# Patient Record
Sex: MALE | Race: WHITE | NOT HISPANIC OR LATINO | Employment: OTHER | URBAN - NONMETROPOLITAN AREA
[De-identification: names, ages, dates, MRNs, and addresses within clinical notes are randomized per-mention and may not be internally consistent; named-entity substitution may affect disease eponyms.]

---

## 2017-01-09 ENCOUNTER — TELEPHONE (OUTPATIENT)
Dept: FAMILY MEDICINE CLINIC | Facility: CLINIC | Age: 37
End: 2017-01-09

## 2017-01-09 NOTE — TELEPHONE ENCOUNTER
Called requesting a refill on testosterone,debbie is on your desk.      Wife notified she said she would have to reschedule his apt. That he had started a new job & could not come today but would be ok on testosterone until seen.

## 2017-01-18 ENCOUNTER — TELEPHONE (OUTPATIENT)
Dept: FAMILY MEDICINE CLINIC | Facility: CLINIC | Age: 37
End: 2017-01-18

## 2017-01-18 ENCOUNTER — OFFICE VISIT (OUTPATIENT)
Dept: FAMILY MEDICINE CLINIC | Facility: CLINIC | Age: 37
End: 2017-01-18

## 2017-01-18 VITALS
WEIGHT: 315 LBS | HEART RATE: 84 BPM | OXYGEN SATURATION: 98 % | HEIGHT: 69 IN | BODY MASS INDEX: 46.65 KG/M2 | DIASTOLIC BLOOD PRESSURE: 98 MMHG | SYSTOLIC BLOOD PRESSURE: 133 MMHG

## 2017-01-18 DIAGNOSIS — M51.36 DDD (DEGENERATIVE DISC DISEASE), LUMBAR: ICD-10-CM

## 2017-01-18 DIAGNOSIS — F41.9 ANXIETY AND DEPRESSION: ICD-10-CM

## 2017-01-18 DIAGNOSIS — E66.8 HYPOGONADAL OBESITY: Primary | ICD-10-CM

## 2017-01-18 DIAGNOSIS — F32.A ANXIETY AND DEPRESSION: ICD-10-CM

## 2017-01-18 DIAGNOSIS — I10 BENIGN ESSENTIAL HTN: ICD-10-CM

## 2017-01-18 LAB — TESTOST SERPL-MCNC: 549.09 NG/DL (ref 123.06–813.86)

## 2017-01-18 PROCEDURE — 84403 ASSAY OF TOTAL TESTOSTERONE: CPT | Performed by: NURSE PRACTITIONER

## 2017-01-18 PROCEDURE — 99214 OFFICE O/P EST MOD 30 MIN: CPT | Performed by: NURSE PRACTITIONER

## 2017-01-18 PROCEDURE — 36415 COLL VENOUS BLD VENIPUNCTURE: CPT | Performed by: NURSE PRACTITIONER

## 2017-01-18 PROCEDURE — 84402 ASSAY OF FREE TESTOSTERONE: CPT | Performed by: NURSE PRACTITIONER

## 2017-01-18 RX ORDER — LAMOTRIGINE 25 MG/1
25 TABLET ORAL DAILY
Qty: 30 TABLET | Refills: 5 | Status: SHIPPED | OUTPATIENT
Start: 2017-01-18 | End: 2017-06-07 | Stop reason: SDUPTHER

## 2017-01-18 RX ORDER — DIAZEPAM 5 MG/1
5 TABLET ORAL 2 TIMES DAILY PRN
Qty: 30 TABLET | Refills: 0 | Status: SHIPPED | OUTPATIENT
Start: 2017-01-18 | End: 2017-09-01

## 2017-01-18 RX ORDER — BUSPIRONE HYDROCHLORIDE 10 MG/1
10 TABLET ORAL 3 TIMES DAILY
Qty: 90 TABLET | Refills: 5 | Status: SHIPPED | OUTPATIENT
Start: 2017-01-18 | End: 2017-09-01

## 2017-01-18 NOTE — TELEPHONE ENCOUNTER
----- Message from ALONDRA Li sent at 1/18/2017  1:50 PM EST -----  Let patient know testosterone level is improved      Left a message to return call.    Notified & verbalized understanding

## 2017-01-18 NOTE — MR AVS SNAPSHOT
Brady Mcknight   1/18/2017 11:00 AM   Office Visit    Dept Phone:  [phone]   Encounter #:  [id number]    Provider:  ALONDRA Li   Department:  [facility]                Your Full Care Plan              Today's Medication Changes          These changes are accurate as of: 1/18/17 12:07 PM.  If you have any questions, ask your nurse or doctor.               New Medication(s)Ordered:     busPIRone 10 MG tablet   Commonly known as:  BUSPAR   Take 1 tablet by mouth 3 (Three) Times a Day.       diazePAM 5 MG tablet   Commonly known as:  VALIUM   Take 1 tablet by mouth 2 (Two) Times a Day As Needed for anxiety.         Stop taking medication(s)listed here:     fluconazole 100 MG tablet   Commonly known as:  DIFLUCAN           nystatin 214836 UNIT/GM cream   Commonly known as:  MYCOSTATIN                Where to Get Your Medications      These medications were sent to [address] [facility] [phone] [phone] [address] [address]     Phone:  [phone]     busPIRone 10 MG tablet    lamoTRIgine 25 MG tablet         You can get these medications from any pharmacy     Bring a paper prescription for each of these medications     diazePAM 5 MG tablet    Testosterone 75 MG pellet                  Your Updated Medication List          This list is accurate as of: 1/18/17 12:07 PM.  Always use your most recent med list.                busPIRone 10 MG tablet   Commonly known as:  BUSPAR   Take 1 tablet by mouth 3 (Three) Times a Day.       diazePAM 5 MG tablet   Commonly known as:  VALIUM   Take 1 tablet by mouth 2 (Two) Times a Day As Needed for anxiety.       escitalopram 20 MG tablet   Commonly known as:  LEXAPRO   Take 1 tablet by mouth Daily.       lamoTRIgine 25 MG tablet   Commonly known as:  LAMICTAL   Take 1 tablet by mouth Daily.       levothyroxine 150 MCG  "tablet   Commonly known as:  SYNTHROID, LEVOTHROID   Take 1 tablet by mouth Daily.       lisinopril 5 MG tablet   Commonly known as:  PRINIVIL,ZESTRIL   Take 1 tablet by mouth Daily.       nystatin-triamcinolone 651375-9.1 UNIT/GM-% ointment   Commonly known as:  MYCOLOG   Apply  topically 2 (Two) Times a Day.       Testosterone 75 MG pellet   Place 75 mg on the skin Daily.               We Performed the Following     Testosterone, Free, Total     Testosterone       You Were Diagnosed With        Codes Comments    Hypogonadal obesity    -  Primary ICD-10-CM: E66.8  ICD-9-CM: 259.9       Instructions     None    Patient Instructions History      Upcoming Appointments     Visit Type Date Time Department    FOLLOW UP 1/18/2017 11:00 AM [facility] [place]    FOLLOW UP 2/15/2017  3:40 PM [facility]      MyChart Signup     Our records indicate that you have declined [facility] signup. If you would like to sign up for [employer], please email [email or web address] or call [phone] to obtain an activation code.             Other Info from Your Visit           Your Appointments     Feb 15, 2017  3:40 PM EST   Follow Up with ALONDRA Li   [facility] (--)    [address]  [address]  [place] [address] [phone]           Arrive 15 minutes prior to appointment.              Allergies     No Known Allergies      Reason for Visit     Med Refill testosterone      Vital Signs     Blood Pressure Pulse Height Weight Oxygen Saturation Body Mass Index    133/98 84 69\" (175.3 cm) 347 lb 6.4 oz (158 kg) 98% 51.3 kg/m2    Smoking Status                   Never Smoker           Problems and Diagnoses Noted     Obesity of endocrine origin    -  Primary        "

## 2017-01-18 NOTE — PROGRESS NOTES
Subjective   Brady Mcknight is a 36 y.o. male.     Anxiety   Presents for follow-up visit. Symptoms include irritability (improved ), nervous/anxious behavior and panic. Patient reports no palpitations or shortness of breath. Primary symptoms comment: mood adn depression better now experiencing more anxiety and panic attacks.  Dont want to leave the home finds any reason to miss work and leave work.  comfortable at home. Symptoms occur constantly. The severity of symptoms is interfering with daily activities. The quality of sleep is fair. Nighttime awakenings: occasional.       Hypertension   This is a chronic problem. The current episode started more than 1 year ago. The problem has been waxing and waning since onset. Associated symptoms include anxiety and malaise/fatigue. Pertinent negatives include no headaches, palpitations, peripheral edema or shortness of breath. Risk factors for coronary artery disease include family history and obesity. Past treatments include ACE inhibitors, diuretics and beta blockers. The current treatment provides moderate improvement. Compliance problems include exercise and diet.    Obesity   This is a chronic (several weeks status post gastric banding following with Dr Mendoza today) problem. The problem has been gradually improving (weight loss is slow). Pertinent negatives include no headaches or numbness.   Back Pain   This is a chronic problem. The current episode started 1 to 4 weeks ago (Several weeks ago seen at [facility] for sudden increase in low back pain.  No known injury.  Pian resolved with rest ). The problem has been resolved since onset. The pain is present in the lumbar spine. The quality of the pain is described as aching. The pain does not radiate. The pain is mild. Pertinent negatives include no bladder incontinence, bowel incontinence, headaches, leg pain, numbness, paresis, paresthesias, pelvic pain, perianal numbness or tingling.      The following portions  of the patient's history were reviewed and updated as appropriate: allergies, current medications, past family history, past medical history, past social history, past surgical history and problem list.      Review of Systems   Constitutional: Positive for irritability (improved ) and malaise/fatigue. Negative for activity change.   HENT: Negative.    Eyes: Negative.    Respiratory: Negative.  Negative for shortness of breath.    Cardiovascular: Negative.  Negative for palpitations.   Gastrointestinal: Negative.  Negative for bowel incontinence.   Genitourinary: Negative.  Negative for bladder incontinence and pelvic pain.   Musculoskeletal: Positive for back pain (no pain at present).   Neurological: Negative.  Negative for tingling, numbness, headaches and paresthesias.   Psychiatric/Behavioral: The patient is nervous/anxious.    All other systems reviewed and are negative.      Procedures    Objective   Physical Exam   Constitutional: He is oriented to person, place, and time. He appears well-developed and well-nourished. No distress.   HENT:   Head: Normocephalic.   Neck: Neck supple. No thyromegaly present.   Cardiovascular: Normal rate, regular rhythm, normal heart sounds and intact distal pulses.    No murmur heard.  Pulmonary/Chest: Effort normal and breath sounds normal.   Abdominal: Soft. Bowel sounds are normal. He exhibits no distension. There is no tenderness.   Musculoskeletal: He exhibits no edema.        Lumbar back: Normal.   Neurological: He is alert and oriented to person, place, and time.   Skin: Skin is warm and dry. He is not diaphoretic.   Psychiatric: He has a normal mood and affect. His behavior is normal. Judgment and thought content normal. Cognition and memory are normal.   Nursing note and vitals reviewed.      Assessment/Plan   Discussed with patient impression and plan, patient verbalizes understanding. Xray reviewed from [facility] clinic.  Patient will monitor blood pressure at home.   Agrees with new medications today.  Brady was seen today for med refill.    Diagnoses and all orders for this visit:    Hypogonadal obesity  -     Testosterone, Free, Total  -     Testosterone    Anxiety and depression    Benign essential HTN    DDD (degenerative disc disease), lumbar    Other orders  -     Testosterone 75 MG pellet; Place 75 mg on the skin Daily.  -     lamoTRIgine (LAMICTAL) 25 MG tablet; Take 1 tablet by mouth Daily.  -     busPIRone (BUSPAR) 10 MG tablet; Take 1 tablet by mouth 3 (Three) Times a Day.  -     diazePAM (VALIUM) 5 MG tablet; Take 1 tablet by mouth 2 (Two) Times a Day As Needed for anxiety.      Evans #[id number] Reviewed and is consistent.  UDS reviewed and is consistent.  Patient comfort assessment guide reviewed and updated today.    As part of the patient's treatment plan they are being prescribed a controlled substance/ substances with potential for abuse.  This patient has been made aware of the appropriate use of such medications, including potential risk of somnolence, limited ability to drive and/or work safely, and potential for overdose.  It has also been made clear these medications are for use by the patient only, without concomitant use of alcohol or other substances unless prescribed/advised by medical provider.    Patient has completed prescribing agreement detailing terms of continued prescribing of controlled substances including monitoring EVANS reports, urine drug screens, and pill counts.  The patient is aware EVANS reports are reviewed on a regular basis and scanned into the chart.    History and physical exam exhibit continued safe and appropriate use of controlled substances.

## 2017-01-20 LAB
CONV COMMENT: NORMAL
TESTOST FREE SERPL-MCNC: 15.5 PG/ML (ref 8.7–25.1)
TESTOST SERPL-MCNC: 573 NG/DL (ref 348–1197)

## 2017-02-22 ENCOUNTER — OFFICE VISIT (OUTPATIENT)
Dept: FAMILY MEDICINE CLINIC | Facility: CLINIC | Age: 37
End: 2017-02-22

## 2017-02-22 VITALS
HEIGHT: 69 IN | WEIGHT: 315 LBS | SYSTOLIC BLOOD PRESSURE: 142 MMHG | HEART RATE: 76 BPM | DIASTOLIC BLOOD PRESSURE: 93 MMHG | BODY MASS INDEX: 46.65 KG/M2 | OXYGEN SATURATION: 98 %

## 2017-02-22 DIAGNOSIS — R21 RASH AND NONSPECIFIC SKIN ERUPTION: ICD-10-CM

## 2017-02-22 DIAGNOSIS — F41.9 ANXIETY AND DEPRESSION: Primary | ICD-10-CM

## 2017-02-22 DIAGNOSIS — I10 BENIGN ESSENTIAL HTN: ICD-10-CM

## 2017-02-22 DIAGNOSIS — F32.A ANXIETY AND DEPRESSION: Primary | ICD-10-CM

## 2017-02-22 DIAGNOSIS — F40.10 SOCIAL ANXIETY DISORDER: ICD-10-CM

## 2017-02-22 LAB
ALBUMIN SERPL-MCNC: 4.6 G/DL (ref 3.5–5)
ALBUMIN/GLOB SERPL: 1.3 G/DL (ref 1.5–2.5)
ALP SERPL-CCNC: 88 U/L (ref 40–129)
ALT SERPL W P-5'-P-CCNC: 20 U/L (ref 10–44)
ANION GAP SERPL CALCULATED.3IONS-SCNC: 4.8 MMOL/L (ref 3.6–11.2)
AST SERPL-CCNC: 23 U/L (ref 10–34)
BILIRUB SERPL-MCNC: 0.8 MG/DL (ref 0.2–1.8)
BUN BLD-MCNC: 8 MG/DL (ref 7–21)
BUN/CREAT SERPL: 11.3 (ref 7–25)
CALCIUM SPEC-SCNC: 9.9 MG/DL (ref 7.7–10)
CHLORIDE SERPL-SCNC: 104 MMOL/L (ref 99–112)
CO2 SERPL-SCNC: 32.2 MMOL/L (ref 24.3–31.9)
CREAT BLD-MCNC: 0.71 MG/DL (ref 0.43–1.29)
GFR SERPL CREATININE-BSD FRML MDRD: 124 ML/MIN/1.73
GLOBULIN UR ELPH-MCNC: 3.6 GM/DL
GLUCOSE BLD-MCNC: 88 MG/DL (ref 70–110)
OSMOLALITY SERPL CALC.SUM OF ELEC: 279 MOSM/KG (ref 273–305)
POTASSIUM BLD-SCNC: 4.2 MMOL/L (ref 3.5–5.3)
PROT SERPL-MCNC: 8.2 G/DL (ref 6–8)
SODIUM BLD-SCNC: 141 MMOL/L (ref 135–153)
TSH SERPL DL<=0.05 MIU/L-ACNC: 2.15 MIU/ML (ref 0.55–4.78)

## 2017-02-22 PROCEDURE — 84443 ASSAY THYROID STIM HORMONE: CPT | Performed by: NURSE PRACTITIONER

## 2017-02-22 PROCEDURE — 36415 COLL VENOUS BLD VENIPUNCTURE: CPT | Performed by: NURSE PRACTITIONER

## 2017-02-22 PROCEDURE — 80053 COMPREHEN METABOLIC PANEL: CPT | Performed by: NURSE PRACTITIONER

## 2017-02-22 PROCEDURE — 99213 OFFICE O/P EST LOW 20 MIN: CPT | Performed by: NURSE PRACTITIONER

## 2017-02-22 RX ORDER — NYSTATIN AND TRIAMCINOLONE ACETONIDE 100000; 1 [USP'U]/G; MG/G
OINTMENT TOPICAL 2 TIMES DAILY
Qty: 30 G | Refills: 0 | Status: SHIPPED | OUTPATIENT
Start: 2017-02-22 | End: 2017-09-01

## 2017-02-22 RX ORDER — FLUCONAZOLE 100 MG/1
100 TABLET ORAL DAILY
Qty: 30 TABLET | Refills: 0 | Status: SHIPPED | OUTPATIENT
Start: 2017-02-22 | End: 2017-09-01

## 2017-02-22 RX ORDER — LISINOPRIL 5 MG/1
10 TABLET ORAL DAILY
Qty: 30 TABLET | Refills: 5 | Status: SHIPPED | OUTPATIENT
Start: 2017-02-22 | End: 2017-09-21 | Stop reason: SDUPTHER

## 2017-02-22 RX ORDER — ESCITALOPRAM OXALATE 20 MG/1
20 TABLET ORAL DAILY
Qty: 30 TABLET | Refills: 5 | Status: SHIPPED | OUTPATIENT
Start: 2017-02-22 | End: 2017-09-21 | Stop reason: SDUPTHER

## 2017-02-22 RX ORDER — LEVOTHYROXINE SODIUM 0.15 MG/1
150 TABLET ORAL DAILY
Qty: 30 TABLET | Refills: 5 | Status: SHIPPED | OUTPATIENT
Start: 2017-02-22 | End: 2017-09-21 | Stop reason: SDUPTHER

## 2017-02-22 NOTE — PROGRESS NOTES
Subjective   Brady Mcknight is a 36 y.o. male.     Hypertension   This is a chronic problem. The current episode started more than 1 year ago. The problem is unchanged. Associated symptoms include anxiety and malaise/fatigue. Pertinent negatives include no blurred vision, chest pain, headaches, neck pain, orthopnea, peripheral edema, PND or shortness of breath. Risk factors for coronary artery disease include obesity (recent lap band with 75 pound weight loss). Past treatments include ACE inhibitors. The current treatment provides moderate improvement. Compliance problems include exercise and diet.  Hypertensive end-organ damage includes a thyroid problem. There is no history of angina, kidney disease, CAD/MI, CVA, heart failure, left ventricular hypertrophy, PVD or retinopathy.   Depression   Visit Type: follow-up (initail symptoms felt extreme anger.  Those symptoms are better now doesnt like the public lost his job.  tried to move jobs unable to tolerate new people and space frequently missed and left early and lost his job. Unable to tolerate the grocery store )  Patient presents with the following symptoms: decreased concentration, fatigue, irritability (feels rage at times unable to tolerate people and public.  Does ok at home), nervousness/anxiety and panic.  Patient is not experiencing: chest pain, shortness of breath, suicidal ideas, suicidal planning and thoughts of death.  Frequency of symptoms: most days   Severity: interfering with daily activities   Sleep quality: fair  Nighttime awakenings: occasional         The following portions of the patient's history were reviewed and updated as appropriate: allergies, current medications, past family history, past medical history, past social history, past surgical history and problem list.      Review of Systems   Constitutional: Positive for activity change, irritability (feels rage at times unable to tolerate people and public.  Does ok at home) and  malaise/fatigue.   HENT: Negative.    Eyes: Negative for blurred vision.   Respiratory: Negative.  Negative for shortness of breath.    Cardiovascular: Negative.  Negative for chest pain, orthopnea and PND.   Gastrointestinal: Negative.    Musculoskeletal: Negative.  Negative for neck pain.   Skin: Negative.    Neurological: Negative for headaches.   Psychiatric/Behavioral: Positive for agitation and decreased concentration. Negative for self-injury, sleep disturbance and suicidal ideas. The patient is nervous/anxious.    All other systems reviewed and are negative.      Procedures    Objective   Physical Exam   Constitutional: He is oriented to person, place, and time. He appears well-developed and well-nourished. No distress.   HENT:   Head: Normocephalic.   Right Ear: External ear normal.   Left Ear: External ear normal.   Nose: Nose normal.   Mouth/Throat: Oropharynx is clear and moist. No oropharyngeal exudate.   Eyes: Conjunctivae are normal. Right eye exhibits no discharge. Left eye exhibits no discharge.   Neck: Neck supple.   Cardiovascular: Normal rate, regular rhythm, normal heart sounds and intact distal pulses.    No murmur heard.  Pulmonary/Chest: Effort normal and breath sounds normal. No respiratory distress.   Lymphadenopathy:     He has no cervical adenopathy.   Neurological: He is alert and oriented to person, place, and time.   Skin: Skin is warm and dry. He is not diaphoretic.   Psychiatric: He has a normal mood and affect. His speech is normal and behavior is normal. Judgment and thought content normal. Cognition and memory are normal.   Nursing note and vitals reviewed.      Assessment/Plan   Discussed with patient impression and plan, patient verbalizes understanding.  Agrees to psych referral.. Will later refer to derm if rash no improvments .  Brady was seen today for follow-up and rash.    Diagnoses and all orders for this visit:    Anxiety and depression  -     Ambulatory Referral to  Psychiatry    Social anxiety disorder  -     Ambulatory Referral to Psychiatry    Benign essential HTN  -     Comprehensive Metabolic Panel  -     TSH    Rash and nonspecific skin eruption    Other orders  -     Testosterone 75 MG pellet; Place 75 mg on the skin Daily.  -     escitalopram (LEXAPRO) 20 MG tablet; Take 1 tablet by mouth Daily.  -     levothyroxine (SYNTHROID, LEVOTHROID) 150 MCG tablet; Take 1 tablet by mouth Daily.  -     lisinopril (PRINIVIL,ZESTRIL) 5 MG tablet; Take 2 tablets by mouth Daily.  -     nystatin-triamcinolone (MYCOLOG) 077784-8.1 UNIT/GM-% ointment; Apply  topically 2 (Two) Times a Day.  -     fluconazole (DIFLUCAN) 100 MG tablet; Take 1 tablet by mouth Daily.        Evans # [id number]  Reviewed and is consistent.    Patient comfort assessment guide reviewed and updated today.    As part of the patient's treatment plan they are being prescribed a controlled substance/ substances with potential for abuse.  This patient has been made aware of the appropriate use of such medications, including potential risk of somnolence, limited ability to drive and/or work safely, and potential for overdose.  It has also been made clear these medications are for use by the patient only, without concomitant use of alcohol or other substances unless prescribed/advised by medical provider.    Patient has completed prescribing agreement detailing terms of continued prescribing of controlled substances including monitoring EVANS reports, urine drug screens, and pill counts.  The patient is aware EVANS reports are reviewed on a regular basis and scanned into the chart.    History and physical exam exhibit continued safe and appropriate use of controlled substances.

## 2017-03-28 RX ORDER — OSELTAMIVIR PHOSPHATE 75 MG/1
75 CAPSULE ORAL 2 TIMES DAILY
Qty: 10 CAPSULE | Refills: 0 | Status: SHIPPED | OUTPATIENT
Start: 2017-03-28 | End: 2017-09-01

## 2017-06-07 ENCOUNTER — TELEPHONE (OUTPATIENT)
Dept: FAMILY MEDICINE CLINIC | Facility: CLINIC | Age: 37
End: 2017-06-07

## 2017-06-07 RX ORDER — LAMOTRIGINE 25 MG/1
25 TABLET ORAL DAILY
Qty: 30 TABLET | Refills: 5 | Status: SHIPPED | OUTPATIENT
Start: 2017-06-07 | End: 2017-10-24 | Stop reason: SDUPTHER

## 2017-06-07 NOTE — TELEPHONE ENCOUNTER
REQUESTS REFILL ON LAMICTAL 25MG. [facility]      Refilled as requested per orders/protocol.

## 2017-06-07 NOTE — TELEPHONE ENCOUNTER
[facility] pharmacy called for a refill on his Testosterone cream,debbie on your desk.

## 2017-08-28 ENCOUNTER — APPOINTMENT (OUTPATIENT)
Dept: ULTRASOUND IMAGING | Facility: HOSPITAL | Age: 37
End: 2017-08-28

## 2017-08-28 ENCOUNTER — HOSPITAL ENCOUNTER (EMERGENCY)
Facility: HOSPITAL | Age: 37
Discharge: HOME | End: 2017-08-28
Attending: EMERGENCY MEDICINE | Admitting: EMERGENCY MEDICINE

## 2017-08-28 VITALS
OXYGEN SATURATION: 100 % | WEIGHT: 304 LBS | RESPIRATION RATE: 18 BRPM | TEMPERATURE: 98.2 F | BODY MASS INDEX: 43.52 KG/M2 | HEIGHT: 70 IN | HEART RATE: 85 BPM | SYSTOLIC BLOOD PRESSURE: 118 MMHG | DIASTOLIC BLOOD PRESSURE: 63 MMHG

## 2017-08-28 DIAGNOSIS — K80.20 CALCULUS OF GALLBLADDER WITHOUT CHOLECYSTITIS WITHOUT OBSTRUCTION: Primary | ICD-10-CM

## 2017-08-28 LAB
ALBUMIN SERPL-MCNC: 4.3 G/DL (ref 3.5–5)
ALBUMIN/GLOB SERPL: 1.3 G/DL (ref 1.5–2.5)
ALP SERPL-CCNC: 81 U/L (ref 40–129)
ALT SERPL W P-5'-P-CCNC: 16 U/L (ref 10–44)
AMYLASE SERPL-CCNC: 58 U/L (ref 28–100)
ANION GAP SERPL CALCULATED.3IONS-SCNC: 5.8 MMOL/L (ref 3.6–11.2)
AST SERPL-CCNC: 20 U/L (ref 10–34)
BASOPHILS # BLD AUTO: 0.03 10*3/MM3 (ref 0–0.3)
BASOPHILS NFR BLD AUTO: 0.3 % (ref 0–2)
BILIRUB SERPL-MCNC: 0.6 MG/DL (ref 0.2–1.8)
BILIRUB UR QL STRIP: NEGATIVE
BUN BLD-MCNC: 12 MG/DL (ref 7–21)
BUN/CREAT SERPL: 14.1 (ref 7–25)
CALCIUM SPEC-SCNC: 9.6 MG/DL (ref 7.7–10)
CHLORIDE SERPL-SCNC: 107 MMOL/L (ref 99–112)
CLARITY UR: CLEAR
CO2 SERPL-SCNC: 28.2 MMOL/L (ref 24.3–31.9)
COLOR UR: YELLOW
CREAT BLD-MCNC: 0.85 MG/DL (ref 0.43–1.29)
DEPRECATED RDW RBC AUTO: 43.4 FL (ref 37–54)
EOSINOPHIL # BLD AUTO: 0.25 10*3/MM3 (ref 0–0.7)
EOSINOPHIL NFR BLD AUTO: 2.5 % (ref 0–5)
ERYTHROCYTE [DISTWIDTH] IN BLOOD BY AUTOMATED COUNT: 13.5 % (ref 11.5–14.5)
GFR SERPL CREATININE-BSD FRML MDRD: 100 ML/MIN/1.73
GLOBULIN UR ELPH-MCNC: 3.2 GM/DL
GLUCOSE BLD-MCNC: 88 MG/DL (ref 70–110)
GLUCOSE UR STRIP-MCNC: NEGATIVE MG/DL
HCT VFR BLD AUTO: 44.4 % (ref 42–52)
HGB BLD-MCNC: 14.8 G/DL (ref 14–18)
HGB UR QL STRIP.AUTO: NEGATIVE
IMM GRANULOCYTES # BLD: 0.03 10*3/MM3 (ref 0–0.03)
IMM GRANULOCYTES NFR BLD: 0.3 % (ref 0–0.5)
KETONES UR QL STRIP: NEGATIVE
LEUKOCYTE ESTERASE UR QL STRIP.AUTO: NEGATIVE
LIPASE SERPL-CCNC: 25 U/L (ref 13–60)
LYMPHOCYTES # BLD AUTO: 3.07 10*3/MM3 (ref 1–3)
LYMPHOCYTES NFR BLD AUTO: 30.1 % (ref 21–51)
MCH RBC QN AUTO: 29.6 PG (ref 27–33)
MCHC RBC AUTO-ENTMCNC: 33.3 G/DL (ref 33–37)
MCV RBC AUTO: 88.8 FL (ref 80–94)
MONOCYTES # BLD AUTO: 1.08 10*3/MM3 (ref 0.1–0.9)
MONOCYTES NFR BLD AUTO: 10.6 % (ref 0–10)
NEUTROPHILS # BLD AUTO: 5.73 10*3/MM3 (ref 1.4–6.5)
NEUTROPHILS NFR BLD AUTO: 56.2 % (ref 30–70)
NITRITE UR QL STRIP: NEGATIVE
OSMOLALITY SERPL CALC.SUM OF ELEC: 280.4 MOSM/KG (ref 273–305)
PH UR STRIP.AUTO: 6 [PH] (ref 5–8)
PLATELET # BLD AUTO: 288 10*3/MM3 (ref 130–400)
PMV BLD AUTO: 10.1 FL (ref 6–10)
POTASSIUM BLD-SCNC: 4.2 MMOL/L (ref 3.5–5.3)
PROT SERPL-MCNC: 7.5 G/DL (ref 6–8)
PROT UR QL STRIP: NEGATIVE
RBC # BLD AUTO: 5 10*6/MM3 (ref 4.7–6.1)
SODIUM BLD-SCNC: 141 MMOL/L (ref 135–153)
SP GR UR STRIP: 1.02 (ref 1–1.03)
UROBILINOGEN UR QL STRIP: NORMAL
WBC NRBC COR # BLD: 10.19 10*3/MM3 (ref 4.5–12.5)

## 2017-08-28 PROCEDURE — 96375 TX/PRO/DX INJ NEW DRUG ADDON: CPT

## 2017-08-28 PROCEDURE — 76705 ECHO EXAM OF ABDOMEN: CPT

## 2017-08-28 PROCEDURE — 25010000002 HYDROMORPHONE PER 4 MG

## 2017-08-28 PROCEDURE — 96374 THER/PROPH/DIAG INJ IV PUSH: CPT

## 2017-08-28 PROCEDURE — 81003 URINALYSIS AUTO W/O SCOPE: CPT | Performed by: EMERGENCY MEDICINE

## 2017-08-28 PROCEDURE — 80053 COMPREHEN METABOLIC PANEL: CPT | Performed by: EMERGENCY MEDICINE

## 2017-08-28 PROCEDURE — 99284 EMERGENCY DEPT VISIT MOD MDM: CPT

## 2017-08-28 PROCEDURE — 82150 ASSAY OF AMYLASE: CPT | Performed by: EMERGENCY MEDICINE

## 2017-08-28 PROCEDURE — 85025 COMPLETE CBC W/AUTO DIFF WBC: CPT | Performed by: EMERGENCY MEDICINE

## 2017-08-28 PROCEDURE — 83690 ASSAY OF LIPASE: CPT | Performed by: EMERGENCY MEDICINE

## 2017-08-28 PROCEDURE — 76705 ECHO EXAM OF ABDOMEN: CPT | Performed by: RADIOLOGY

## 2017-08-28 PROCEDURE — 25010000002 ONDANSETRON PER 1 MG: Performed by: EMERGENCY MEDICINE

## 2017-08-28 RX ORDER — HYDROCODONE BITARTRATE AND ACETAMINOPHEN 5; 325 MG/1; MG/1
1 TABLET ORAL EVERY 6 HOURS PRN
Qty: 12 TABLET | Refills: 0 | Status: SHIPPED | OUTPATIENT
Start: 2017-08-28 | End: 2017-09-01

## 2017-08-28 RX ORDER — SODIUM CHLORIDE 0.9 % (FLUSH) 0.9 %
10 SYRINGE (ML) INJECTION AS NEEDED
Status: DISCONTINUED | OUTPATIENT
Start: 2017-08-28 | End: 2017-08-28 | Stop reason: HOSPADM

## 2017-08-28 RX ORDER — ONDANSETRON 2 MG/ML
4 INJECTION INTRAMUSCULAR; INTRAVENOUS ONCE
Status: COMPLETED | OUTPATIENT
Start: 2017-08-28 | End: 2017-08-28

## 2017-08-28 RX ORDER — ONDANSETRON 4 MG/1
4 TABLET, ORALLY DISINTEGRATING ORAL EVERY 6 HOURS PRN
Qty: 12 TABLET | Refills: 0 | Status: SHIPPED | OUTPATIENT
Start: 2017-08-28 | End: 2017-09-01

## 2017-08-28 RX ADMIN — Medication 1 MG: at 17:51

## 2017-08-28 RX ADMIN — HYDROMORPHONE HYDROCHLORIDE 1 MG: 1 INJECTION, SOLUTION INTRAMUSCULAR; INTRAVENOUS; SUBCUTANEOUS at 17:51

## 2017-08-28 RX ADMIN — ONDANSETRON 4 MG: 2 INJECTION, SOLUTION INTRAMUSCULAR; INTRAVENOUS at 17:42

## 2017-08-29 ENCOUNTER — TELEPHONE (OUTPATIENT)
Dept: FAMILY MEDICINE CLINIC | Facility: CLINIC | Age: 37
End: 2017-08-29

## 2017-08-29 DIAGNOSIS — K80.20 GALLSTONES: Primary | ICD-10-CM

## 2017-08-29 NOTE — TELEPHONE ENCOUNTER
I reviewed the Ultrasound and placed the order        Patient notified & verbalized understanding.

## 2017-08-29 NOTE — TELEPHONE ENCOUNTER
Wife called reports patient was seen in the ER at [facility] & told his Gallbladder has to come out,she wants to know if you can do a referral to Dr. Mcdaniels or does he have to come in & see you first?

## 2017-09-01 ENCOUNTER — APPOINTMENT (OUTPATIENT)
Dept: PREADMISSION TESTING | Facility: HOSPITAL | Age: 37
End: 2017-09-01

## 2017-09-05 ENCOUNTER — ANESTHESIA EVENT (OUTPATIENT)
Dept: PERIOP | Facility: HOSPITAL | Age: 37
End: 2017-09-05

## 2017-09-05 ENCOUNTER — APPOINTMENT (OUTPATIENT)
Dept: GENERAL RADIOLOGY | Facility: HOSPITAL | Age: 37
End: 2017-09-05

## 2017-09-05 ENCOUNTER — ANESTHESIA (OUTPATIENT)
Dept: PERIOP | Facility: HOSPITAL | Age: 37
End: 2017-09-05

## 2017-09-05 ENCOUNTER — HOSPITAL ENCOUNTER (OUTPATIENT)
Facility: HOSPITAL | Age: 37
Setting detail: HOSPITAL OUTPATIENT SURGERY
Discharge: HOME | End: 2017-09-05
Attending: SURGERY | Admitting: SURGERY

## 2017-09-05 VITALS
OXYGEN SATURATION: 96 % | RESPIRATION RATE: 20 BRPM | DIASTOLIC BLOOD PRESSURE: 89 MMHG | TEMPERATURE: 97.7 F | HEART RATE: 88 BPM | SYSTOLIC BLOOD PRESSURE: 152 MMHG

## 2017-09-05 DIAGNOSIS — K80.10 CHOLELITHIASIS WITH CHRONIC CHOLECYSTITIS: ICD-10-CM

## 2017-09-05 PROCEDURE — C1726 CATH, BAL DIL, NON-VASCULAR: HCPCS | Performed by: SURGERY

## 2017-09-05 PROCEDURE — 25010000002 MIDAZOLAM PER 1 MG: Performed by: NURSE ANESTHETIST, CERTIFIED REGISTERED

## 2017-09-05 PROCEDURE — 0 IOPAMIDOL 61 % SOLUTION: Performed by: SURGERY

## 2017-09-05 PROCEDURE — 76000 FLUOROSCOPY <1 HR PHYS/QHP: CPT

## 2017-09-05 PROCEDURE — 25010000002 KETOROLAC TROMETHAMINE PER 15 MG: Performed by: NURSE ANESTHETIST, CERTIFIED REGISTERED

## 2017-09-05 PROCEDURE — 25010000002 PROPOFOL 10 MG/ML EMULSION: Performed by: NURSE ANESTHETIST, CERTIFIED REGISTERED

## 2017-09-05 PROCEDURE — 25010000002 FENTANYL CITRATE (PF) 100 MCG/2ML SOLUTION: Performed by: NURSE ANESTHETIST, CERTIFIED REGISTERED

## 2017-09-05 PROCEDURE — 25010000002 MEPERIDINE PER 100 MG: Performed by: NURSE ANESTHETIST, CERTIFIED REGISTERED

## 2017-09-05 PROCEDURE — 25010000002 DEXAMETHASONE PER 1 MG: Performed by: NURSE ANESTHETIST, CERTIFIED REGISTERED

## 2017-09-05 PROCEDURE — 74300 X-RAY BILE DUCTS/PANCREAS: CPT | Performed by: RADIOLOGY

## 2017-09-05 PROCEDURE — 25010000002 ONDANSETRON PER 1 MG: Performed by: NURSE ANESTHETIST, CERTIFIED REGISTERED

## 2017-09-05 PROCEDURE — 25010000002 HYDROMORPHONE PER 4 MG: Performed by: ANESTHESIOLOGY

## 2017-09-05 PROCEDURE — 25010000002 MIDAZOLAM PER 1 MG: Performed by: ANESTHESIOLOGY

## 2017-09-05 RX ORDER — MIDAZOLAM HYDROCHLORIDE 1 MG/ML
1 INJECTION INTRAMUSCULAR; INTRAVENOUS
Status: DISCONTINUED | OUTPATIENT
Start: 2017-09-05 | End: 2017-09-05

## 2017-09-05 RX ORDER — SODIUM CHLORIDE, SODIUM LACTATE, POTASSIUM CHLORIDE, CALCIUM CHLORIDE 600; 310; 30; 20 MG/100ML; MG/100ML; MG/100ML; MG/100ML
125 INJECTION, SOLUTION INTRAVENOUS CONTINUOUS
Status: DISCONTINUED | OUTPATIENT
Start: 2017-09-05 | End: 2017-09-05

## 2017-09-05 RX ORDER — DEXAMETHASONE SODIUM PHOSPHATE 10 MG/ML
INJECTION INTRAMUSCULAR; INTRAVENOUS AS NEEDED
Status: DISCONTINUED | OUTPATIENT
Start: 2017-09-05 | End: 2017-09-05 | Stop reason: SURG

## 2017-09-05 RX ORDER — MAGNESIUM HYDROXIDE 1200 MG/15ML
LIQUID ORAL AS NEEDED
Status: DISCONTINUED | OUTPATIENT
Start: 2017-09-05 | End: 2017-09-05 | Stop reason: HOSPADM

## 2017-09-05 RX ORDER — HYDROCODONE BITARTRATE AND ACETAMINOPHEN 7.5; 325 MG/1; MG/1
1 TABLET ORAL EVERY 6 HOURS PRN
Status: DISCONTINUED | OUTPATIENT
Start: 2017-09-05 | End: 2017-09-05 | Stop reason: HOSPADM

## 2017-09-05 RX ORDER — FAMOTIDINE 10 MG/ML
INJECTION, SOLUTION INTRAVENOUS AS NEEDED
Status: DISCONTINUED | OUTPATIENT
Start: 2017-09-05 | End: 2017-09-05 | Stop reason: SURG

## 2017-09-05 RX ORDER — MIDAZOLAM HYDROCHLORIDE 1 MG/ML
2 INJECTION INTRAMUSCULAR; INTRAVENOUS
Status: DISCONTINUED | OUTPATIENT
Start: 2017-09-05 | End: 2017-09-05

## 2017-09-05 RX ORDER — LIDOCAINE HYDROCHLORIDE 20 MG/ML
INJECTION, SOLUTION INFILTRATION; PERINEURAL AS NEEDED
Status: DISCONTINUED | OUTPATIENT
Start: 2017-09-05 | End: 2017-09-05 | Stop reason: SURG

## 2017-09-05 RX ORDER — MIDAZOLAM HYDROCHLORIDE 1 MG/ML
INJECTION INTRAMUSCULAR; INTRAVENOUS AS NEEDED
Status: DISCONTINUED | OUTPATIENT
Start: 2017-09-05 | End: 2017-09-05 | Stop reason: SURG

## 2017-09-05 RX ORDER — FENTANYL CITRATE 50 UG/ML
50 INJECTION, SOLUTION INTRAMUSCULAR; INTRAVENOUS
Status: DISCONTINUED | OUTPATIENT
Start: 2017-09-05 | End: 2017-09-05 | Stop reason: HOSPADM

## 2017-09-05 RX ORDER — KETOROLAC TROMETHAMINE 30 MG/ML
INJECTION, SOLUTION INTRAMUSCULAR; INTRAVENOUS AS NEEDED
Status: DISCONTINUED | OUTPATIENT
Start: 2017-09-05 | End: 2017-09-05 | Stop reason: SURG

## 2017-09-05 RX ORDER — IPRATROPIUM BROMIDE AND ALBUTEROL SULFATE 2.5; .5 MG/3ML; MG/3ML
3 SOLUTION RESPIRATORY (INHALATION) ONCE AS NEEDED
Status: DISCONTINUED | OUTPATIENT
Start: 2017-09-05 | End: 2017-09-05 | Stop reason: HOSPADM

## 2017-09-05 RX ORDER — SODIUM CHLORIDE 9 MG/ML
INJECTION, SOLUTION INTRAVENOUS AS NEEDED
Status: DISCONTINUED | OUTPATIENT
Start: 2017-09-05 | End: 2017-09-05 | Stop reason: HOSPADM

## 2017-09-05 RX ORDER — OXYCODONE HYDROCHLORIDE AND ACETAMINOPHEN 5; 325 MG/1; MG/1
1 TABLET ORAL ONCE AS NEEDED
Status: DISCONTINUED | OUTPATIENT
Start: 2017-09-05 | End: 2017-09-05 | Stop reason: HOSPADM

## 2017-09-05 RX ORDER — BUPIVACAINE HYDROCHLORIDE AND EPINEPHRINE 5; 5 MG/ML; UG/ML
INJECTION, SOLUTION EPIDURAL; INTRACAUDAL; PERINEURAL AS NEEDED
Status: DISCONTINUED | OUTPATIENT
Start: 2017-09-05 | End: 2017-09-05 | Stop reason: HOSPADM

## 2017-09-05 RX ORDER — HYDROCODONE BITARTRATE AND ACETAMINOPHEN 7.5; 325 MG/1; MG/1
1-2 TABLET ORAL EVERY 4 HOURS PRN
Qty: 24 TABLET | Refills: 0 | Status: SHIPPED | OUTPATIENT
Start: 2017-09-05 | End: 2017-10-11

## 2017-09-05 RX ORDER — MEPERIDINE HYDROCHLORIDE 25 MG/ML
INJECTION INTRAMUSCULAR; INTRAVENOUS; SUBCUTANEOUS AS NEEDED
Status: DISCONTINUED | OUTPATIENT
Start: 2017-09-05 | End: 2017-09-05 | Stop reason: SURG

## 2017-09-05 RX ORDER — ONDANSETRON 2 MG/ML
INJECTION INTRAMUSCULAR; INTRAVENOUS AS NEEDED
Status: DISCONTINUED | OUTPATIENT
Start: 2017-09-05 | End: 2017-09-05 | Stop reason: SURG

## 2017-09-05 RX ORDER — FENTANYL CITRATE 50 UG/ML
INJECTION, SOLUTION INTRAMUSCULAR; INTRAVENOUS AS NEEDED
Status: DISCONTINUED | OUTPATIENT
Start: 2017-09-05 | End: 2017-09-05 | Stop reason: SURG

## 2017-09-05 RX ORDER — SODIUM CHLORIDE 0.9 % (FLUSH) 0.9 %
1-10 SYRINGE (ML) INJECTION AS NEEDED
Status: DISCONTINUED | OUTPATIENT
Start: 2017-09-05 | End: 2017-09-05

## 2017-09-05 RX ORDER — MEPERIDINE HYDROCHLORIDE 25 MG/ML
12.5 INJECTION INTRAMUSCULAR; INTRAVENOUS; SUBCUTANEOUS
Status: DISCONTINUED | OUTPATIENT
Start: 2017-09-05 | End: 2017-09-05 | Stop reason: HOSPADM

## 2017-09-05 RX ORDER — PROPOFOL 10 MG/ML
VIAL (ML) INTRAVENOUS AS NEEDED
Status: DISCONTINUED | OUTPATIENT
Start: 2017-09-05 | End: 2017-09-05 | Stop reason: SURG

## 2017-09-05 RX ORDER — ONDANSETRON 2 MG/ML
4 INJECTION INTRAMUSCULAR; INTRAVENOUS ONCE AS NEEDED
Status: DISCONTINUED | OUTPATIENT
Start: 2017-09-05 | End: 2017-09-05 | Stop reason: HOSPADM

## 2017-09-05 RX ADMIN — SODIUM CHLORIDE, POTASSIUM CHLORIDE, SODIUM LACTATE AND CALCIUM CHLORIDE 125 ML/HR: 600; 310; 30; 20 INJECTION, SOLUTION INTRAVENOUS at 10:12

## 2017-09-05 RX ADMIN — MEPERIDINE HYDROCHLORIDE 25 MG: 25 INJECTION, SOLUTION INTRAMUSCULAR; INTRAVENOUS; SUBCUTANEOUS at 11:54

## 2017-09-05 RX ADMIN — FENTANYL CITRATE 50 MCG: 50 INJECTION INTRAMUSCULAR; INTRAVENOUS at 11:20

## 2017-09-05 RX ADMIN — FENTANYL CITRATE 50 MCG: 50 INJECTION INTRAMUSCULAR; INTRAVENOUS at 12:09

## 2017-09-05 RX ADMIN — FAMOTIDINE 20 MG: 10 INJECTION, SOLUTION INTRAVENOUS at 11:15

## 2017-09-05 RX ADMIN — FENTANYL CITRATE 50 MCG: 50 INJECTION INTRAMUSCULAR; INTRAVENOUS at 11:09

## 2017-09-05 RX ADMIN — MIDAZOLAM HYDROCHLORIDE 2 MG: 1 INJECTION, SOLUTION INTRAMUSCULAR; INTRAVENOUS at 10:12

## 2017-09-05 RX ADMIN — HYDROCODONE BITARTRATE AND ACETAMINOPHEN 1 TABLET: 7.5; 325 TABLET ORAL at 13:09

## 2017-09-05 RX ADMIN — FENTANYL CITRATE 50 MCG: 50 INJECTION INTRAMUSCULAR; INTRAVENOUS at 12:24

## 2017-09-05 RX ADMIN — HYDROMORPHONE HYDROCHLORIDE 1 MG: 1 INJECTION, SOLUTION INTRAMUSCULAR; INTRAVENOUS; SUBCUTANEOUS at 12:39

## 2017-09-05 RX ADMIN — LIDOCAINE HYDROCHLORIDE 60 MG: 20 INJECTION, SOLUTION INFILTRATION; PERINEURAL at 11:10

## 2017-09-05 RX ADMIN — PROPOFOL 50 MG: 10 INJECTION, EMULSION INTRAVENOUS at 11:11

## 2017-09-05 RX ADMIN — FENTANYL CITRATE 50 MCG: 50 INJECTION INTRAMUSCULAR; INTRAVENOUS at 11:15

## 2017-09-05 RX ADMIN — DEXAMETHASONE SODIUM PHOSPHATE 8 MG: 10 INJECTION INTRAMUSCULAR; INTRAVENOUS at 11:14

## 2017-09-05 RX ADMIN — MIDAZOLAM HYDROCHLORIDE 2 MG: 1 INJECTION, SOLUTION INTRAMUSCULAR; INTRAVENOUS at 11:02

## 2017-09-05 RX ADMIN — MEPERIDINE HYDROCHLORIDE 12.5 MG: 25 INJECTION, SOLUTION INTRAMUSCULAR; INTRAVENOUS; SUBCUTANEOUS at 12:19

## 2017-09-05 RX ADMIN — MEPERIDINE HYDROCHLORIDE 25 MG: 25 INJECTION, SOLUTION INTRAMUSCULAR; INTRAVENOUS; SUBCUTANEOUS at 11:57

## 2017-09-05 RX ADMIN — FENTANYL CITRATE 50 MCG: 50 INJECTION INTRAMUSCULAR; INTRAVENOUS at 11:07

## 2017-09-05 RX ADMIN — FENTANYL CITRATE 50 MCG: 50 INJECTION INTRAMUSCULAR; INTRAVENOUS at 11:04

## 2017-09-05 RX ADMIN — KETOROLAC TROMETHAMINE 30 MG: 30 INJECTION, SOLUTION INTRAMUSCULAR; INTRAVENOUS at 11:53

## 2017-09-05 RX ADMIN — ONDANSETRON 4 MG: 2 INJECTION, SOLUTION INTRAMUSCULAR; INTRAVENOUS at 11:02

## 2017-09-05 RX ADMIN — PROPOFOL 100 MG: 10 INJECTION, EMULSION INTRAVENOUS at 11:10

## 2017-09-05 NOTE — PLAN OF CARE
Problem: Patient Care Overview (Adult)  Goal: Plan of Care Review  Outcome: Ongoing (interventions implemented as appropriate)    09/05/17 1020   Coping/Psychosocial Response Interventions   Plan Of Care Reviewed With patient   Patient Care Overview   Progress progress toward functional goals as expected

## 2017-09-05 NOTE — PLAN OF CARE
Problem: Perioperative Period (Adult)  Goal: Signs and Symptoms of Listed Potential Problems Will be Absent or Manageable (Perioperative Period)  Outcome: Ongoing (interventions implemented as appropriate)    09/05/17 1018   Perioperative Period   Problems Assessed (Perioperative Period) pain   Problems Present (Perioperative Period) none

## 2017-09-05 NOTE — ANESTHESIA PREPROCEDURE EVALUATION
Anesthesia Evaluation     no history of anesthetic complications:  NPO Solid Status: > 8 hours  NPO Liquid Status: > 8 hours     Airway   Mallampati: II  TM distance: >3 FB  Neck ROM: full  no difficulty expected  Dental - normal exam     Pulmonary - normal exam   Cardiovascular - normal exam    (+) hypertension, valvular problems/murmurs, CHF, hyperlipidemia      Neuro/Psych  GI/Hepatic/Renal/Endo    (+) morbid obesity, hypothyroidism,     Musculoskeletal     Abdominal  - normal exam   Substance History      OB/GYN          Other                                        Anesthesia Plan    ASA 3     general     intravenous induction   Anesthetic plan and risks discussed with patient.

## 2017-09-05 NOTE — ANESTHESIA PROCEDURE NOTES
Airway  Urgency: elective    Airway not difficult    General Information and Staff    Patient location during procedure: OR    Indications and Patient Condition  Indications for airway management: airway protection    Preoxygenated: yes  MILS maintained throughout  Mask difficulty assessment: 1 - vent by mask    Final Airway Details  Final airway type: endotracheal airway      Successful airway: ETT    Successful intubation technique: direct laryngoscopy  Facilitating devices/methods: intubating stylet and cricoid pressure  Endotracheal tube insertion site: oral  Blade: William  Blade size: #4  ETT size: 7.0 mm  Cormack-Lehane Classification: grade IIb - view of arytenoids or posterior of glottis only  Placement verified by: chest auscultation and capnometry   Measured from: lips  ETT to lips (cm): 23  Number of attempts at approach: 1

## 2017-09-05 NOTE — ANESTHESIA POSTPROCEDURE EVALUATION
Patient: Brady Mcknight    Procedure Summary     Date Anesthesia Start Anesthesia Stop Room / Location    09/05/17 1102 1209 [facility] OR 01 / [facility]       Procedure Diagnosis Surgeon Provider    CHOLECYSTECTOMY LAPAROSCOPIC INTRAOPERATIVE CHOLANGIOGRAM (N/A Abdomen) (K80.10) MD Ok Matamoros MD          Anesthesia Type: general  Last vitals  BP   152/89 (09/05/17 1319)    Temp     98   Pulse   88 (09/05/17 1319)   Resp   20 (09/05/17 1319)    SpO2   96 % (09/05/17 1319)      Post Anesthesia Care and Evaluation    Patient location during evaluation: bedside  Patient participation: complete - patient participated  Level of consciousness: awake and alert  Pain score: 1  Pain management: adequate  Airway patency: patent  Anesthetic complications: No anesthetic complications  PONV Status: none  Cardiovascular status: acceptable  Respiratory status: acceptable  Hydration status: acceptable

## 2017-09-05 NOTE — PLAN OF CARE
Problem: Patient Care Overview (Adult)  Goal: Discharge Needs Assessment  Outcome: Ongoing (interventions implemented as appropriate)    09/05/17 1019   Discharge Needs Assessment   Concerns To Be Addressed no discharge needs identified   Readmission Within The Last 30 Days no previous admission in last 30 days   Equipment Needed After Discharge none   Discharge Disposition home or self-care   Current Health   Anticipated Changes Related to Illness none   Self-Care   Equipment Currently Used at Home none   Living Environment   Transportation Available car

## 2017-09-05 NOTE — OP NOTE
Procedure Note  Brady Mcknight    Surgeon: Joni Dawn MD    Pre-op Dx:  SX gallstones  Post-op Dx:  same  Procedure laparoscopic cholecystectomy    Indications: Right upper quadrant pain and gallstones         Surgeon: Joni Dawn MD     Assist: robyn Denise    Anesthesia: General endotracheal anesthesia    ASA Class: 2          Findings:  Gallstones    Estimated Blood Loss:  *No blood loss documented*           Drains: None                   Specimens:   ID Type Source Tests Collected by Time Destination   A :  Tissue Gallbladder TISSUE EXAM Joni Dawn MD 9/5/2017 1122               Implants and Grafts: * No implants in log *           Complications:  None    Details of operation:  After satisfactory general endotracheal anesthesia was obtained, the abdomen was prepped and draped in the usual fashion.  A subumbilical incision was made and a varies needle was introduced into the abdominal cavity.  3 L of CO2 gas were insufflated and a 5 mm port was placed.  5 mm retracting clamps were introduced subcostally at the anterior axillary line and the midclavicular line.  A 5 mm port was placed in the subxiphoid position.  Adhesions were lysed and the cystic duct was dissected out.  A negative intraoperative cholangiogram was obtained and the cystic duct and cystic artery were doubly hemoclipped and divided.  The gallbladder was dissected from the gallbladder fossa and removed through the subxiphoid position.  The subxiphoid fascia was closed with 2-0 PDS suture and then all air and all instruments were removed.  Incisions were closed with 4-0 Prolene and the wounds were injected with Marcaine.  Sterile dressings were applied.           Disposition: PACU - hemodynamically stable.           Condition: stable     C.C.:  Angelina CANTU and Dr. [place]o Dawn

## 2017-09-08 LAB
LAB AP CASE REPORT: NORMAL
Lab: NORMAL
PATH REPORT.FINAL DX SPEC: NORMAL

## 2017-09-21 ENCOUNTER — OFFICE VISIT (OUTPATIENT)
Dept: FAMILY MEDICINE CLINIC | Facility: CLINIC | Age: 37
End: 2017-09-21

## 2017-09-21 VITALS
HEART RATE: 65 BPM | SYSTOLIC BLOOD PRESSURE: 105 MMHG | DIASTOLIC BLOOD PRESSURE: 72 MMHG | HEIGHT: 70 IN | BODY MASS INDEX: 44.47 KG/M2 | WEIGHT: 310.6 LBS | OXYGEN SATURATION: 98 %

## 2017-09-21 DIAGNOSIS — Z23 IMMUNIZATION DUE: ICD-10-CM

## 2017-09-21 DIAGNOSIS — F40.10 SOCIAL ANXIETY DISORDER: Primary | ICD-10-CM

## 2017-09-21 DIAGNOSIS — I10 BENIGN ESSENTIAL HTN: ICD-10-CM

## 2017-09-21 DIAGNOSIS — E29.1 HYPOGONADISM IN MALE: ICD-10-CM

## 2017-09-21 DIAGNOSIS — E07.9 DISEASE OF THYROID GLAND: ICD-10-CM

## 2017-09-21 LAB
ALBUMIN SERPL-MCNC: 4.5 G/DL (ref 3.5–5)
ALBUMIN/GLOB SERPL: 1.4 G/DL (ref 1.5–2.5)
ALP SERPL-CCNC: 84 U/L (ref 40–129)
ALT SERPL W P-5'-P-CCNC: 19 U/L (ref 10–44)
ANION GAP SERPL CALCULATED.3IONS-SCNC: 5.2 MMOL/L (ref 3.6–11.2)
AST SERPL-CCNC: 22 U/L (ref 10–34)
BASOPHILS # BLD AUTO: 0.03 10*3/MM3 (ref 0–0.3)
BASOPHILS NFR BLD AUTO: 0.4 % (ref 0–2)
BILIRUB SERPL-MCNC: 0.9 MG/DL (ref 0.2–1.8)
BUN BLD-MCNC: 11 MG/DL (ref 7–21)
BUN/CREAT SERPL: 14.5 (ref 7–25)
CALCIUM SPEC-SCNC: 9.7 MG/DL (ref 7.7–10)
CHLORIDE SERPL-SCNC: 107 MMOL/L (ref 99–112)
CHOLEST SERPL-MCNC: 173 MG/DL (ref 0–200)
CO2 SERPL-SCNC: 28.8 MMOL/L (ref 24.3–31.9)
CREAT BLD-MCNC: 0.76 MG/DL (ref 0.43–1.29)
DEPRECATED RDW RBC AUTO: 43 FL (ref 37–54)
EOSINOPHIL # BLD AUTO: 0.16 10*3/MM3 (ref 0–0.7)
EOSINOPHIL NFR BLD AUTO: 2.2 % (ref 0–5)
ERYTHROCYTE [DISTWIDTH] IN BLOOD BY AUTOMATED COUNT: 13.5 % (ref 11.5–14.5)
GFR SERPL CREATININE-BSD FRML MDRD: 114 ML/MIN/1.73
GLOBULIN UR ELPH-MCNC: 3.3 GM/DL
GLUCOSE BLD-MCNC: 91 MG/DL (ref 70–110)
HCT VFR BLD AUTO: 41.9 % (ref 42–52)
HDLC SERPL-MCNC: 63 MG/DL (ref 60–100)
HGB BLD-MCNC: 13.6 G/DL (ref 14–18)
IMM GRANULOCYTES # BLD: 0.01 10*3/MM3 (ref 0–0.03)
IMM GRANULOCYTES NFR BLD: 0.1 % (ref 0–0.5)
LDLC SERPL CALC-MCNC: 95 MG/DL (ref 0–100)
LDLC/HDLC SERPL: 1.5 {RATIO}
LYMPHOCYTES # BLD AUTO: 2.34 10*3/MM3 (ref 1–3)
LYMPHOCYTES NFR BLD AUTO: 31.8 % (ref 21–51)
MCH RBC QN AUTO: 28.9 PG (ref 27–33)
MCHC RBC AUTO-ENTMCNC: 32.5 G/DL (ref 33–37)
MCV RBC AUTO: 89.1 FL (ref 80–94)
MONOCYTES # BLD AUTO: 0.59 10*3/MM3 (ref 0.1–0.9)
MONOCYTES NFR BLD AUTO: 8 % (ref 0–10)
NEUTROPHILS # BLD AUTO: 4.23 10*3/MM3 (ref 1.4–6.5)
NEUTROPHILS NFR BLD AUTO: 57.5 % (ref 30–70)
OSMOLALITY SERPL CALC.SUM OF ELEC: 280.2 MOSM/KG (ref 273–305)
PLATELET # BLD AUTO: 306 10*3/MM3 (ref 130–400)
PMV BLD AUTO: 9.9 FL (ref 6–10)
POTASSIUM BLD-SCNC: 3.9 MMOL/L (ref 3.5–5.3)
PROT SERPL-MCNC: 7.8 G/DL (ref 6–8)
RBC # BLD AUTO: 4.7 10*6/MM3 (ref 4.7–6.1)
SODIUM BLD-SCNC: 141 MMOL/L (ref 135–153)
TRIGL SERPL-MCNC: 77 MG/DL (ref 0–150)
TSH SERPL DL<=0.05 MIU/L-ACNC: 3.4 MIU/ML (ref 0.55–4.78)
VIT B12 BLD-MCNC: 372 PG/ML (ref 211–911)
VLDLC SERPL-MCNC: 15.4 MG/DL
WBC NRBC COR # BLD: 7.36 10*3/MM3 (ref 4.5–12.5)

## 2017-09-21 PROCEDURE — 80053 COMPREHEN METABOLIC PANEL: CPT | Performed by: NURSE PRACTITIONER

## 2017-09-21 PROCEDURE — 90686 IIV4 VACC NO PRSV 0.5 ML IM: CPT | Performed by: NURSE PRACTITIONER

## 2017-09-21 PROCEDURE — 84443 ASSAY THYROID STIM HORMONE: CPT | Performed by: NURSE PRACTITIONER

## 2017-09-21 PROCEDURE — 84402 ASSAY OF FREE TESTOSTERONE: CPT | Performed by: NURSE PRACTITIONER

## 2017-09-21 PROCEDURE — 90471 IMMUNIZATION ADMIN: CPT | Performed by: NURSE PRACTITIONER

## 2017-09-21 PROCEDURE — 85025 COMPLETE CBC W/AUTO DIFF WBC: CPT | Performed by: NURSE PRACTITIONER

## 2017-09-21 PROCEDURE — 84403 ASSAY OF TOTAL TESTOSTERONE: CPT | Performed by: NURSE PRACTITIONER

## 2017-09-21 PROCEDURE — 80061 LIPID PANEL: CPT | Performed by: NURSE PRACTITIONER

## 2017-09-21 PROCEDURE — 36415 COLL VENOUS BLD VENIPUNCTURE: CPT | Performed by: NURSE PRACTITIONER

## 2017-09-21 PROCEDURE — 99214 OFFICE O/P EST MOD 30 MIN: CPT | Performed by: NURSE PRACTITIONER

## 2017-09-21 PROCEDURE — 82607 VITAMIN B-12: CPT | Performed by: NURSE PRACTITIONER

## 2017-09-21 RX ORDER — LISINOPRIL 5 MG/1
10 TABLET ORAL DAILY
Qty: 30 TABLET | Refills: 5 | Status: SHIPPED | OUTPATIENT
Start: 2017-09-21 | End: 2018-05-01 | Stop reason: SDUPTHER

## 2017-09-21 RX ORDER — LEVOTHYROXINE SODIUM 0.15 MG/1
150 TABLET ORAL DAILY
Qty: 30 TABLET | Refills: 5 | Status: SHIPPED | OUTPATIENT
Start: 2017-09-21 | End: 2017-10-02 | Stop reason: SDUPTHER

## 2017-09-21 RX ORDER — ESCITALOPRAM OXALATE 20 MG/1
20 TABLET ORAL DAILY
Qty: 30 TABLET | Refills: 5 | Status: SHIPPED | OUTPATIENT
Start: 2017-09-21 | End: 2017-10-02 | Stop reason: SDUPTHER

## 2017-09-21 NOTE — PROGRESS NOTES
Subjective   Brady Mcknight is a 37 y.o. male.     Hypertension   This is a chronic (weight loss surgery with continued slow progress happy with results) problem. The current episode started more than 1 year ago. The problem is unchanged. The problem is controlled. Associated symptoms include anxiety and malaise/fatigue. Pertinent negatives include no blurred vision, chest pain, headaches, neck pain, orthopnea, palpitations, peripheral edema, PND or shortness of breath. Risk factors for coronary artery disease include obesity (recent lap band with continued weight loss). Past treatments include ACE inhibitors. The current treatment provides significant improvement. Compliance problems include exercise and diet.  Hypertensive end-organ damage includes a thyroid problem. There is no history of angina, kidney disease, CAD/MI, CVA, heart failure, left ventricular hypertrophy, PVD or retinopathy.   Depression   Visit Type: follow-up (mood is poor mostly low and continues to be anxious an unable to tolerate public places.  Scheduled many times with psych and counseling and didnt follow.  Feels his medication helps his anger but not the panic and anxiety.  )  Patient presents with the following symptoms: decreased concentration, depressed mood, excessive worry, fatigue, irritability (feels rage at times unable to tolerate people and public.  Does ok at home.  Hates feeling like this feels non productive in society ), nervousness/anxiety and panic (recent interview and inability to complete the required evaluations. ).  Patient is not experiencing: chest pain, palpitations, shortness of breath, suicidal ideas, suicidal planning and thoughts of death.  Frequency of symptoms: most days   Severity: interfering with daily activities   Sleep quality: fair  Nighttime awakenings: occasional    Fatigue   This is a chronic problem. The problem occurs constantly. The problem has been waxing and waning (felt initially testosterone  helped). Associated symptoms include fatigue. Pertinent negatives include no chest pain, headaches or neck pain. The symptoms are aggravated by stress. He has tried rest for the symptoms. The treatment provided mild relief.      The following portions of the patient's history were reviewed and updated as appropriate: allergies, current medications, past family history, past medical history, past social history, past surgical history and problem list.      Review of Systems   Constitutional: Positive for activity change, fatigue, irritability (feels rage at times unable to tolerate people and public.  Does ok at home.  Hates feeling like this feels non productive in society ) and malaise/fatigue.   HENT: Negative.    Eyes: Negative for blurred vision.   Respiratory: Negative.  Negative for shortness of breath.    Cardiovascular: Negative.  Negative for chest pain, palpitations, orthopnea and PND.   Gastrointestinal: Negative.    Musculoskeletal: Negative.  Negative for neck pain.   Skin: Negative.    Neurological: Negative for headaches.   Psychiatric/Behavioral: Positive for agitation and decreased concentration. Negative for self-injury, sleep disturbance and suicidal ideas. The patient is nervous/anxious.    All other systems reviewed and are negative.      Procedures    Objective   Physical Exam   Constitutional: He is oriented to person, place, and time. He appears well-developed and well-nourished. No distress.   HENT:   Head: Normocephalic.   Mouth/Throat: No oropharyngeal exudate.   Eyes: Conjunctivae are normal. Right eye exhibits no discharge. Left eye exhibits no discharge.   Neck: Neck supple.   Cardiovascular: Normal rate, regular rhythm, normal heart sounds and intact distal pulses.    No murmur heard.  Pulmonary/Chest: Effort normal and breath sounds normal. No respiratory distress.   Musculoskeletal: He exhibits no edema.   Lymphadenopathy:     He has no cervical adenopathy.   Neurological: He is  alert and oriented to person, place, and time.   Skin: Skin is warm and dry. He is not diaphoretic.   Psychiatric: He has a normal mood and affect. His speech is normal and behavior is normal. Judgment and thought content normal. Cognition and memory are normal.   Nursing note and vitals reviewed.      Assessment/Plan   Discussed with patient impression and plan, patient verbalizes understanding.  Agrees to psych referral.. Will continue with meds as he is currently taking     Brady was seen today for follow-up.    Diagnoses and all orders for this visit:    Social anxiety disorder  -     Ambulatory Referral to Psychiatry    Benign essential HTN  -     CBC & Differential  -     Comprehensive Metabolic Panel  -     Vitamin B12  -     Lipid Panel  -     CBC Auto Differential  -     Osmolality, Calculated; Future  -     Osmolality, Calculated    Disease of thyroid gland  -     CBC & Differential  -     TSH  -     CBC Auto Differential    Hypogonadism in male  -     CBC & Differential  -     Vitamin B12  -     Testosterone, Free, Total  -     CBC Auto Differential    Other orders  -     lisinopril (PRINIVIL,ZESTRIL) 5 MG tablet; Take 2 tablets by mouth Daily.  -     escitalopram (LEXAPRO) 20 MG tablet; Take 1 tablet by mouth Daily.  -     levothyroxine (SYNTHROID, LEVOTHROID) 150 MCG tablet; Take 1 tablet by mouth Daily.  -     Testosterone 75 MG pellet; Place 75 mg on the skin Daily.      [facility] # [id number]  Reviewed and is consistent.    Patient comfort assessment guide reviewed and updated today.    As part of the patient's treatment plan they are being prescribed a controlled substance/ substances with potential for abuse.  This patient has been made aware of the appropriate use of such medications, including potential risk of somnolence, limited ability to drive and/or work safely, and potential for overdose.  It has also been made clear these medications are for use by the patient only, without concomitant use of  alcohol or other substances unless prescribed/advised by medical provider.    Patient has completed prescribing agreement detailing terms of continued prescribing of controlled substances including monitoring EVANS reports, urine drug screens, and pill counts.  The patient is aware EVANS reports are reviewed on a regular basis and scanned into the chart.    History and physical exam exhibit continued safe and appropriate use of controlled substances.

## 2017-09-23 LAB
TESTOST FREE SERPL-MCNC: 9.6 PG/ML (ref 8.7–25.1)
TESTOST SERPL-MCNC: 187 NG/DL (ref 264–916)

## 2017-09-26 DIAGNOSIS — E29.1 HYPOGONADISM MALE: Primary | ICD-10-CM

## 2017-10-02 ENCOUNTER — TELEPHONE (OUTPATIENT)
Dept: FAMILY MEDICINE CLINIC | Facility: CLINIC | Age: 37
End: 2017-10-02

## 2017-10-02 RX ORDER — LEVOTHYROXINE SODIUM 0.15 MG/1
150 TABLET ORAL DAILY
Qty: 30 TABLET | Refills: 5 | Status: SHIPPED | OUTPATIENT
Start: 2017-10-02 | End: 2018-03-28 | Stop reason: SDUPTHER

## 2017-10-02 RX ORDER — ESCITALOPRAM OXALATE 20 MG/1
20 TABLET ORAL DAILY
Qty: 30 TABLET | Refills: 5 | Status: SHIPPED | OUTPATIENT
Start: 2017-10-02 | End: 2017-12-12 | Stop reason: ALTCHOICE

## 2017-10-02 NOTE — TELEPHONE ENCOUNTER
Ok to refill I have also referred him to urology for this  [facility] # [id number] reviewed      Called as requested & patient notified.

## 2017-10-11 ENCOUNTER — OFFICE VISIT (OUTPATIENT)
Dept: FAMILY MEDICINE CLINIC | Facility: CLINIC | Age: 37
End: 2017-10-11

## 2017-10-11 VITALS
DIASTOLIC BLOOD PRESSURE: 80 MMHG | BODY MASS INDEX: 42.52 KG/M2 | HEIGHT: 70 IN | HEART RATE: 63 BPM | OXYGEN SATURATION: 99 % | SYSTOLIC BLOOD PRESSURE: 123 MMHG | WEIGHT: 297 LBS

## 2017-10-11 DIAGNOSIS — F41.9 ANXIETY: Primary | ICD-10-CM

## 2017-10-11 DIAGNOSIS — J32.0 MAXILLARY SINUSITIS, UNSPECIFIED CHRONICITY: ICD-10-CM

## 2017-10-11 DIAGNOSIS — R19.7 DIARRHEA, UNSPECIFIED TYPE: ICD-10-CM

## 2017-10-11 PROCEDURE — 99214 OFFICE O/P EST MOD 30 MIN: CPT | Performed by: NURSE PRACTITIONER

## 2017-10-11 RX ORDER — DIAZEPAM 5 MG/1
5 TABLET ORAL 2 TIMES DAILY PRN
Qty: 30 TABLET | Refills: 0 | Status: SHIPPED | OUTPATIENT
Start: 2017-10-11 | End: 2017-11-02 | Stop reason: SDUPTHER

## 2017-10-11 RX ORDER — AMOXICILLIN AND CLAVULANATE POTASSIUM 875; 125 MG/1; MG/1
1 TABLET, FILM COATED ORAL 2 TIMES DAILY
Qty: 20 TABLET | Refills: 0 | Status: SHIPPED | OUTPATIENT
Start: 2017-10-11 | End: 2017-12-11

## 2017-10-12 NOTE — PROGRESS NOTES
Subjective   Brady Mcknight is a 37 y.o. male.     Facial Pain   This is a new problem. The current episode started 1 to 4 weeks ago. The problem occurs constantly. The problem has been gradually worsening. Associated symptoms include congestion, coughing, headaches and a sore throat. Pertinent negatives include no arthralgias, chills, fever, myalgias, swollen glands or vomiting. Associated symptoms comments: Started with cold symptoms now with pain in teeth and jaw pain.     . Nothing aggravates the symptoms. Treatments tried: decongestants. The treatment provided no relief.   Anxiety   Presents for follow-up visit. Symptoms include decreased concentration, depressed mood, excessive worry, irritability (feels rage at times unable to tolerate people and public.  Does ok at home.  Hates feeling like this feels non productive in society ), nervous/anxious behavior and panic (recent interview and inability to complete the required evaluations. ). Patient reports no suicidal ideas. Primary symptoms comment: States he is having increased symptoms.  In the past could control symptoms in small crowd now unable to tolerate with profuse sweating and anxirty.  Confusion with psych appt.  Admits to missing several appt as he feels he just cant. . Symptoms occur constantly. The severity of symptoms is interfering with daily activities, incapacitating and causing significant distress. The quality of sleep is poor. Nighttime awakenings: several.     His past medical history is significant for depression.   Depression   Visit Type: follow-up (mood is poor mostly low and continues to be anxious an unable to tolerate public places.  Scheduled many times with psych and counseling and didnt follow.  Feels his medication helps his anger but not the panic and anxiety.  Denies any changes or improveme)  Patient presents with the following symptoms: decreased concentration, depressed mood, excessive worry, fatigue, irritability (feels rage at  times unable to tolerate people and public.  Does ok at home.  Hates feeling like this feels non productive in society ), nervousness/anxiety and panic (recent interview and inability to complete the required evaluations. ).  Patient is not experiencing: chest pain, suicidal ideas, suicidal planning, thoughts of death and weight loss (lap banding ).  Frequency of symptoms: most days   Severity: interfering with daily activities   Sleep quality: poor  Nighttime awakenings: several  Compliance with medications: varies.        Diarrhea    This is a chronic (States several episodes of diarrhea per day for several years felt related to his anxiety.  Symptoms worse following lap corinne.  Take mult immodium per day ) problem. The problem occurs 5 to 10 times per day. The problem has been unchanged. The stool consistency is described as watery. Associated symptoms include coughing, headaches and sweats. Pertinent negatives include no arthralgias, bloating, chills, fever, increased  flatus, myalgias, URI, vomiting or weight loss (lap banding ). The symptoms are aggravated by stress. There are no known risk factors. He has tried anti-motility drug for the symptoms. The treatment provided mild relief.      The following portions of the patient's history were reviewed and updated as appropriate: allergies, current medications, past family history, past medical history, past social history, past surgical history and problem list.      Review of Systems   Constitutional: Positive for activity change and irritability (feels rage at times unable to tolerate people and public.  Does ok at home.  Hates feeling like this feels non productive in society ). Negative for chills, fever and weight loss (lap banding ).   HENT: Positive for congestion and sore throat.    Respiratory: Positive for cough.    Cardiovascular: Negative.    Gastrointestinal: Positive for diarrhea. Negative for bloating, flatus and vomiting.   Musculoskeletal:  Negative.  Negative for arthralgias and myalgias.   Skin: Negative.    Neurological: Positive for headaches.   Psychiatric/Behavioral: Positive for agitation, decreased concentration and sleep disturbance. Negative for self-injury and suicidal ideas. The patient is nervous/anxious.    All other systems reviewed and are negative.      Procedures    Objective   Physical Exam   Constitutional: He is oriented to person, place, and time. He appears well-developed and well-nourished. No distress.   HENT:   Head: Normocephalic.   Right Ear: Hearing, tympanic membrane, external ear and ear canal normal.   Left Ear: Hearing, tympanic membrane, external ear and ear canal normal.   Nose: Mucosal edema and rhinorrhea present. Right sinus exhibits maxillary sinus tenderness. Right sinus exhibits no frontal sinus tenderness. Left sinus exhibits maxillary sinus tenderness. Left sinus exhibits no frontal sinus tenderness.   Mouth/Throat: Uvula is midline. Posterior oropharyngeal erythema present. No oropharyngeal exudate.   Eyes: Conjunctivae are normal. Right eye exhibits no discharge. Left eye exhibits no discharge.   Neck: Neck supple.   Cardiovascular: Normal rate, regular rhythm, normal heart sounds and intact distal pulses.    No murmur heard.  Pulmonary/Chest: Effort normal and breath sounds normal. No respiratory distress.   Abdominal:   Obese     Musculoskeletal: He exhibits no edema.   Lymphadenopathy:     He has no cervical adenopathy.   Neurological: He is alert and oriented to person, place, and time.   Skin: Skin is warm and dry. He is not diaphoretic.   Psychiatric: He has a normal mood and affect. His speech is normal and behavior is normal. Judgment and thought content normal. Cognition and memory are normal.   Nursing note and vitals reviewed.      Assessment/Plan   Discussed with patient impression and plan, patient verbalizes understanding.  Agrees to keep upcoming Psych appointment.  Will continue with meds as  he is currently taking.  IF facial pain does not resolve will return sooner .      Brady was seen today for facial pain and anxiety.    Diagnoses and all orders for this visit:    Anxiety    Diarrhea, unspecified type  -     Ambulatory Referral to Gastroenterology    Maxillary sinusitis, unspecified chronicity    Other orders  -     diazePAM (VALIUM) 5 MG tablet; Take 1 tablet by mouth 2 (Two) Times a Day As Needed for Anxiety.  -     amoxicillin-clavulanate (AUGMENTIN) 875-125 MG per tablet; Take 1 tablet by mouth 2 (Two) Times a Day.      [facility] # [id number]  Reviewed and is consistent.    Patient comfort assessment guide reviewed and updated today.    As part of the patient's treatment plan they are being prescribed a controlled substance/ substances with potential for abuse.  This patient has been made aware of the appropriate use of such medications, including potential risk of somnolence, limited ability to drive and/or work safely, and potential for overdose.  It has also been made clear these medications are for use by the patient only, without concomitant use of alcohol or other substances unless prescribed/advised by medical provider.    Patient has completed prescribing agreement detailing terms of continued prescribing of controlled substances including monitoring EVANS reports, urine drug screens, and pill counts.  The patient is aware EVANS reports are reviewed on a regular basis and scanned into the chart.    History and physical exam exhibit continued safe and appropriate use of controlled substances.

## 2017-10-19 ENCOUNTER — OFFICE VISIT (OUTPATIENT)
Dept: UROLOGY | Facility: CLINIC | Age: 37
End: 2017-10-19

## 2017-10-19 VITALS
BODY MASS INDEX: 43.99 KG/M2 | HEIGHT: 69 IN | DIASTOLIC BLOOD PRESSURE: 76 MMHG | WEIGHT: 297 LBS | SYSTOLIC BLOOD PRESSURE: 120 MMHG | HEART RATE: 65 BPM

## 2017-10-19 DIAGNOSIS — R79.89 LOW TESTOSTERONE: Primary | ICD-10-CM

## 2017-10-19 DIAGNOSIS — R53.83 OTHER FATIGUE: ICD-10-CM

## 2017-10-19 LAB
BILIRUB BLD-MCNC: NEGATIVE MG/DL
CLARITY, POC: CLEAR
COLOR UR: YELLOW
FSH SERPL-ACNC: 3.3 MIU/ML
GLUCOSE UR STRIP-MCNC: NEGATIVE MG/DL
KETONES UR QL: NEGATIVE
LEUKOCYTE EST, POC: NEGATIVE
LH SERPL-ACNC: 2.8 MIU/ML
NITRITE UR-MCNC: NEGATIVE MG/ML
PH UR: 6.5 [PH] (ref 5–8)
PROT UR STRIP-MCNC: NEGATIVE MG/DL
RBC # UR STRIP: NEGATIVE /UL
SP GR UR: 1.02 (ref 1–1.03)
UROBILINOGEN UR QL: NORMAL

## 2017-10-19 PROCEDURE — 83002 ASSAY OF GONADOTROPIN (LH): CPT | Performed by: NURSE PRACTITIONER

## 2017-10-19 PROCEDURE — 83001 ASSAY OF GONADOTROPIN (FSH): CPT | Performed by: NURSE PRACTITIONER

## 2017-10-19 PROCEDURE — 84146 ASSAY OF PROLACTIN: CPT | Performed by: NURSE PRACTITIONER

## 2017-10-19 PROCEDURE — 99214 OFFICE O/P EST MOD 30 MIN: CPT | Performed by: NURSE PRACTITIONER

## 2017-10-19 NOTE — PROGRESS NOTES
Chief Complaint:          Chief Complaint   Patient presents with   • Low Testosterone       HPI:   37 y.o. male being seen with his wife today for history of low testosterone. Patient's initial testosterone drawn at [facility] in September 2016 result of 194.  Patient states he was started on compounded testosterone replacement with a repeat testosterone 4 months later which had increased to 549. His last testosterone drawn on 09/21/2017 reveals result of 187. Patient states he does not like the gel as it does not seem to dry and he feels he may not be getting before benefits of the medication. Is wanting to see if he can start on testosterone injections. Patient has had no further testing other than a testosterone level drawn to evaluate as to the cause of his low testosterone at the age of 37. He does complain of chronic fatigue, difficulty sleeping, decreased libido, erectile dysfunction and joint pain. His urinalysis today reveals negative results.    HPI        Past Medical History:        Past Medical History:   Diagnosis Date   • Anger    • Anxiety    • Arthritis    • CHF (congestive heart failure)    • Chronic back pain    • Depression    • Fatigue    • History of heart murmur in childhood    • Hyperlipidemia    • Hypertension    • Hypothyroidism    • Obesity    • Positive urine drug screen 03/2016   • Screening PSA (prostate specific antigen) 2014         Current Meds:     Current Outpatient Prescriptions   Medication Sig Dispense Refill   • amoxicillin-clavulanate (AUGMENTIN) 875-125 MG per tablet Take 1 tablet by mouth 2 (Two) Times a Day. 20 tablet 0   • diazePAM (VALIUM) 5 MG tablet Take 1 tablet by mouth 2 (Two) Times a Day As Needed for Anxiety. 30 tablet 0   • escitalopram (LEXAPRO) 20 MG tablet Take 1 tablet by mouth Daily. 30 tablet 5   • lamoTRIgine (LAMICTAL) 25 MG tablet Take 1 tablet by mouth Daily. 30 tablet 5   • levothyroxine (SYNTHROID, LEVOTHROID) 150 MCG tablet Take 1 tablet  by mouth Daily. 30 tablet 5   • lisinopril (PRINIVIL,ZESTRIL) 5 MG tablet Take 2 tablets by mouth Daily. 30 tablet 5   • Testosterone 75 MG pellet Place 75 mg on the skin Daily. 30 each 0     No current facility-administered medications for this visit.         Allergies:      No Known Allergies     Past Surgical History:     Past Surgical History:   Procedure Laterality Date   • ABDOMINAL SURGERY     • CHOLECYSTECTOMY WITH INTRAOPERATIVE CHOLANGIOGRAM N/A 9/5/2017    Procedure: CHOLECYSTECTOMY LAPAROSCOPIC INTRAOPERATIVE CHOLANGIOGRAM;  Surgeon: Joni Dawn MD;  [facility];  Service:    • GASTRIC BANDING  08/21/2016   • WISDOM TOOTH EXTRACTION           Social History:     Social History     Social History   • Marital status:      Spouse name: N/A   • Number of children: N/A   • Years of education: N/A     Occupational History   •       Social History Main Topics   • Smoking status: Never Smoker   • Smokeless tobacco: Current User     Types: Snuff   • Alcohol use No   • Drug use: No   • Sexual activity: Defer     Other Topics Concern   • Not on file     Social History Narrative       Family History:     Family History   Problem Relation Age of Onset   • Cancer Mother      breast   • Lymphoma Father    • Cancer Sister      lung        Review of Systems:     Review of Systems   Constitutional: Positive for fatigue. Negative for chills and fever.   Respiratory: Negative for cough, shortness of breath and wheezing.    Cardiovascular: Negative for leg swelling.   Gastrointestinal: Positive for abdominal pain and diarrhea. Negative for nausea and vomiting.   Musculoskeletal: Negative for back pain and joint swelling.   Neurological: Negative for dizziness and headaches.   Psychiatric/Behavioral: Negative for confusion.       Physical Exam:     Physical Exam   Constitutional: He is oriented to person, place, and time. He appears well-developed and well-nourished. No distress.    Abdominal: Soft. Bowel sounds are normal. He exhibits no distension and no mass. There is no tenderness. There is no rebound and no guarding. No hernia.   Musculoskeletal: Normal range of motion.   Neurological: He is alert and oriented to person, place, and time.   Skin: Skin is warm and dry. No rash noted. He is not diaphoretic. No pallor.   Psychiatric: He has a normal mood and affect. His behavior is normal. Judgment and thought content normal.   Nursing note and vitals reviewed.      Procedure:     No notes on file      Assessment:     Encounter Diagnoses   Name Primary?   • Low testosterone Yes   • Other fatigue      Orders Placed This Encounter   Procedures   • Prolactin   • Luteinizing Hormone   • Follicle Stimulating Hormone   • POC Urinalysis Dipstick, Automated       Plan:   We will attempt to get the patient's testosterone preauthorized so he can receive a testosterone cypionate injection in the office to see if this helps to increase his energy level. Patient is agreeable to this recommendation. He will also have a prolactin level, LH and FSH drawn today with a return appointment in one week to review the labs and start testosterone replacement. Testosterone replacement was discussed in detail with the patient including the pros and cons including studies regarding heart disease and its effect on prostate cancer and how both are negligible further discussed the risks and benefits of length he also was informed have fertility and a partial affect L spermatogenesis can occur. He does state he has completed his family and is considering a vasectomy.    Counseling was given to patient and family for the following topics diagnostic results, patient and family education, impressions and risks and benefits of treatment options. and the interim medical history and current results were reviewed.  A treatment plan with follow-up was made. Total time of the encounter was 25 minutes and 25 minutes were spent  discussing Low testosterone [E34.9] face-to-face.       This document has been electronically signed by ALONDRA Coffey October 19, 2017 3:36 PM

## 2017-10-21 LAB — PROLACTIN SERPL-MCNC: 12.8 NG/ML (ref 4–15.2)

## 2017-10-24 ENCOUNTER — DOCUMENTATION (OUTPATIENT)
Dept: UROLOGY | Facility: CLINIC | Age: 37
End: 2017-10-24

## 2017-10-24 ENCOUNTER — OFFICE VISIT (OUTPATIENT)
Dept: UROLOGY | Facility: CLINIC | Age: 37
End: 2017-10-24

## 2017-10-24 ENCOUNTER — OFFICE VISIT (OUTPATIENT)
Dept: PSYCHIATRY | Facility: CLINIC | Age: 37
End: 2017-10-24

## 2017-10-24 VITALS
SYSTOLIC BLOOD PRESSURE: 137 MMHG | WEIGHT: 315 LBS | BODY MASS INDEX: 46.65 KG/M2 | HEIGHT: 69 IN | DIASTOLIC BLOOD PRESSURE: 88 MMHG | HEART RATE: 69 BPM

## 2017-10-24 DIAGNOSIS — F39 UNSPECIFIED MOOD (AFFECTIVE) DISORDER (HCC): Primary | ICD-10-CM

## 2017-10-24 DIAGNOSIS — R53.82 CHRONIC FATIGUE: ICD-10-CM

## 2017-10-24 DIAGNOSIS — R79.89 LOW TESTOSTERONE: Primary | ICD-10-CM

## 2017-10-24 LAB
BILIRUB BLD-MCNC: NEGATIVE MG/DL
CLARITY, POC: CLEAR
COLOR UR: YELLOW
GLUCOSE UR STRIP-MCNC: NEGATIVE MG/DL
KETONES UR QL: NEGATIVE
LEUKOCYTE EST, POC: NEGATIVE
NITRITE UR-MCNC: NEGATIVE MG/ML
PH UR: 7.5 [PH] (ref 5–8)
PROT UR STRIP-MCNC: NEGATIVE MG/DL
RBC # UR STRIP: NEGATIVE /UL
SP GR UR: 1.01 (ref 1–1.03)
UROBILINOGEN UR QL: NORMAL

## 2017-10-24 PROCEDURE — 90792 PSYCH DIAG EVAL W/MED SRVCS: CPT | Performed by: NURSE PRACTITIONER

## 2017-10-24 PROCEDURE — 96372 THER/PROPH/DIAG INJ SC/IM: CPT | Performed by: NURSE PRACTITIONER

## 2017-10-24 PROCEDURE — 99213 OFFICE O/P EST LOW 20 MIN: CPT | Performed by: NURSE PRACTITIONER

## 2017-10-24 RX ORDER — TESTOSTERONE CYPIONATE 200 MG/ML
400 INJECTION, SOLUTION INTRAMUSCULAR ONCE
Status: COMPLETED | OUTPATIENT
Start: 2017-10-24 | End: 2017-10-24

## 2017-10-24 RX ORDER — LAMOTRIGINE 25 MG/1
50 TABLET ORAL DAILY
Qty: 60 TABLET | Refills: 0 | Status: SHIPPED | OUTPATIENT
Start: 2017-10-24 | End: 2017-11-07 | Stop reason: SDUPTHER

## 2017-10-24 RX ADMIN — TESTOSTERONE CYPIONATE 400 MG: 200 INJECTION, SOLUTION INTRAMUSCULAR at 11:59

## 2017-10-24 NOTE — PROGRESS NOTES
"Subjective   Brady Mcknight is a 37 y.o. male who is here today for initial appointment to evaluate for medication options at [facility], he was referred by Angelina Valle for depressive symptoms.  He presents to his appointment on time.      Chief Complaint:  Depression, anxiety    History of Present Illness   He states that he has problems with depression, more of the problem is with anxiety.  He has been dealing with anxiety for the past 3 years.  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  He can't go to [facility], was initially having issues with anger.  He states that he has been having problems with depression \"on and off\" for as long as he can remember.   He denies any issues with crying but feels like a \"horrible terrible loser\" because he sees the rest of his family as a success.  He has been feeling miserable, has asked God why is he still here.  He states that his energy levels are very low which he associates with his testosterone levels; he feels like his motivation is ok.  He states that he is ok being around immediate family; he feels like he is different from everyone else which is the reason he isolates; feels easily aggravated by others.  He states that he feels worthless, useless, and hopeless.   He states that he has trouble with staying asleep, he is \"up and down\" all night, gets about 7 hours of sleep per night; states that he has been having \"weird dreams\" in the last couple of weeks.  He shares that he recently had a dream that a close   and had a suit jacket in the coffin; another of a baby that was deformed that nobody wanted (vivid dreams).  Appetite   Anxiety: easily aggravated, overwhelmed over " "small events, difficulty explaining things, aphasia, racing thoughts nothing tend to tie together, denies any what if thoughts, doesn't feel like things can get any worse, denies any panic attacks.  Mood disorder; states that he will make impulsive decisions, spends money he doesn't have, say things that he shouldn't- \"he is the \",  Feels a little smarter then everyone, periods of high energy, like \"flipping a switch\", when he is angry he tends to try to get away from the situations. History to get angry easily and described self as a fighter.  Denies any ADHD symptoms.  Denies any AV hallucinations, denies any SI/HI.      Past Psych History: Denies any history of inpatient or outpatient treatment, reports treatment by PCP and Dr Mcknight.    Denies any history of suicide attempts.  Denies any history of cutting. Denies any history of violence, evasive on history of abuse or trauma.      Previous Psych Meds: Buspar, zoloft, paxil, prozac    Substance Abuse: Denies any ETOH, THC, RX drug abuse or illict drug abuse.  Nicotine- smokeless tobacco, 1 can per say.      Social History:  He current lives in [place] with his wife and 2 children.   X 20 years with supportive relationship.  Two(2) children: 19 yo son, 12 yo son.  Graduated from , able to read and comprehend with no problems.  No legal issues.  NO .  \"Believes in God\"    Family Psychiatric History: Father has anxiety, depression, and PTSD    Family Medical History:  Cancer in his mother and sister; Lymphoma in his father.    Medical/Surgical History: Denies any history of concussions or seizures.  PCP: Angelina Valle  Past Medical History:   Diagnosis Date   • Anger    • Anxiety    • Arthritis    • CHF (congestive heart failure)    • Chronic back pain    • Depression    • Fatigue    • History of heart murmur in childhood    • Hyperlipidemia    • Hypertension    • Hypothyroidism    • Obesity    • Positive urine drug screen 03/2016   • Screening " PSA (prostate specific antigen) 2014     Past Surgical History:   Procedure Laterality Date   • ABDOMINAL SURGERY     • CHOLECYSTECTOMY WITH INTRAOPERATIVE CHOLANGIOGRAM N/A 9/5/2017    Procedure: CHOLECYSTECTOMY LAPAROSCOPIC INTRAOPERATIVE CHOLANGIOGRAM;  Surgeon: Joni Dawn MD;  Location: [facility];  Service:    • GASTRIC BANDING  08/21/2016   • WISDOM TOOTH EXTRACTION         No Known Allergies        Current Medications:   Current Outpatient Prescriptions   Medication Sig Dispense Refill   • amoxicillin-clavulanate (AUGMENTIN) 875-125 MG per tablet Take 1 tablet by mouth 2 (Two) Times a Day. 20 tablet 0   • diazePAM (VALIUM) 5 MG tablet Take 1 tablet by mouth 2 (Two) Times a Day As Needed for Anxiety. 30 tablet 0   • escitalopram (LEXAPRO) 20 MG tablet Take 1 tablet by mouth Daily. 30 tablet 5   • lamoTRIgine (LAMICTAL) 25 MG tablet Take 2 tablets by mouth Daily. 60 tablet 0   • levothyroxine (SYNTHROID, LEVOTHROID) 150 MCG tablet Take 1 tablet by mouth Daily. 30 tablet 5   • lisinopril (PRINIVIL,ZESTRIL) 5 MG tablet Take 2 tablets by mouth Daily. 30 tablet 5   • Testosterone 75 MG pellet Place 75 mg on the skin Daily. 30 each 0     No current facility-administered medications for this visit.          Review of Systems   Constitutional: Negative for appetite change, chills, diaphoresis, fatigue, fever and unexpected weight change.   HENT: Negative for hearing loss, sore throat, trouble swallowing and voice change.    Eyes: Negative for photophobia and visual disturbance.   Respiratory: Negative for cough, chest tightness and shortness of breath.    Cardiovascular: Negative for chest pain and palpitations.   Gastrointestinal: Negative for abdominal pain, constipation, nausea and vomiting.   Endocrine: Negative for cold intolerance and heat intolerance.   Genitourinary: Negative for dysuria and frequency.   Musculoskeletal: Negative for arthralgias, back pain, joint swelling and neck stiffness.   Skin:  "Negative for color change and wound.   Allergic/Immunologic: Negative for environmental allergies and immunocompromised state.   Neurological: Negative for dizziness, tremors, seizures, syncope, weakness, light-headedness and headaches.   Hematological: Negative for adenopathy. Does not bruise/bleed easily.        Objective   Physical Exam   Constitutional: He appears well-developed and well-nourished. No distress.   Neurological: He is alert. Coordination and gait normal.   Vitals reviewed.    Blood pressure 137/88, pulse 69, height 69\" (175.3 cm), weight (!) 317 lb 6.4 oz (144 kg).    Mental Status Exam:   Hygiene:   good  Cooperation:  Cooperative  Eye Contact:  Good  Psychomotor Behavior:  Appropriate  Affect:  Appropriate  Hopelessness: 5  Speech:  Rambling  Thought Process:  Tangential  Thought Content:  Mood congurent  Suicidal:  None  Homicidal:  None  Hallucinations:  None  Delusion:  None  Memory:  Intact  Orientation:  Person, Place, Time and Situation  Reliability:  fair  Insight:  Fair  Judgement:  Fair  Impulse Control:  Fair  Physical/Medical Issues:  No       Short-term goals: Patient will be compliant with clinic appointments.  Patient will be engaged in therapy, medication compliant with minimal side effects. Patient  will report decrease of symptoms and frequency.    Long-term goals: Patient will have minimal symptoms of  with continued medication management. Patient will be compliant with treatment and appointments.       Problem list: mood, anxiety   Strengths: seeking treatment  Weaknesses: anxiety    Assessment/Plan   Problems Addressed this Visit     None      Visit Diagnoses     Unspecified mood (affective) disorder    -  Primary        Continue and increase lamictal 50mg daily  Continue diazepam and lexapro per PCP.     Discussed medication options.  Discussed the risks, benefits, and side effects of the medication; client acknowledged and verbally consented.  Patient is aware to contact " the [facility] with any worsening of symptom.  Patient is agreeable to go to the ER or call 911 should they begin SI/HI.     Return in 2 weeks

## 2017-10-24 NOTE — PROGRESS NOTES
Chief Complaint:          Chief Complaint   Patient presents with   • Low Testosterone       HPI:   37 y.o. male seen today to review labs for history of low testosterone.  He did have a Prolactin level drawn on his last office visit of 10/19/2017 revealing a result of 12.8 (4-15.2), LH - 2.8 (1.5 -9.3), FSH - 3.3 (1.4 -18.1).  He does report some blood in his urine this past week.  He also states he would like to be evaluate for a vasectomy.     HPI        Past Medical History:        Past Medical History:   Diagnosis Date   • Anger    • Anxiety    • Arthritis    • CHF (congestive heart failure)    • Chronic back pain    • Depression    • Fatigue    • History of heart murmur in childhood    • Hyperlipidemia    • Hypertension    • Hypothyroidism    • Obesity    • Positive urine drug screen 03/2016   • Screening PSA (prostate specific antigen) 2014         Current Meds:     Current Outpatient Prescriptions   Medication Sig Dispense Refill   • amoxicillin-clavulanate (AUGMENTIN) 875-125 MG per tablet Take 1 tablet by mouth 2 (Two) Times a Day. 20 tablet 0   • diazePAM (VALIUM) 5 MG tablet Take 1 tablet by mouth 2 (Two) Times a Day As Needed for Anxiety. 30 tablet 0   • escitalopram (LEXAPRO) 20 MG tablet Take 1 tablet by mouth Daily. 30 tablet 5   • lamoTRIgine (LAMICTAL) 25 MG tablet Take 2 tablets by mouth Daily. 60 tablet 0   • levothyroxine (SYNTHROID, LEVOTHROID) 150 MCG tablet Take 1 tablet by mouth Daily. 30 tablet 5   • lisinopril (PRINIVIL,ZESTRIL) 5 MG tablet Take 2 tablets by mouth Daily. 30 tablet 5   • Testosterone 75 MG pellet Place 75 mg on the skin Daily. 30 each 0     No current facility-administered medications for this visit.         Allergies:      No Known Allergies     Past Surgical History:     Past Surgical History:   Procedure Laterality Date   • ABDOMINAL SURGERY     • CHOLECYSTECTOMY WITH INTRAOPERATIVE CHOLANGIOGRAM N/A 9/5/2017    Procedure: CHOLECYSTECTOMY LAPAROSCOPIC INTRAOPERATIVE  CHOLANGIOGRAM;  Surgeon: Joni Dawn MD;  Location: [facility];  Service:    • GASTRIC BANDING  08/21/2016   • WISDOM TOOTH EXTRACTION           Social History:     Social History     Social History   • Marital status:      Spouse name: N/A   • Number of children: N/A   • Years of education: N/A     Occupational History   •       Social History Main Topics   • Smoking status: Never Smoker   • Smokeless tobacco: Current User     Types: Snuff   • Alcohol use No   • Drug use: No   • Sexual activity: Defer     Other Topics Concern   • Not on file     Social History Narrative       Family History:     Family History   Problem Relation Age of Onset   • Cancer Mother      breast   • Lymphoma Father    • Cancer Sister      lung        Review of Systems:     Review of Systems   Constitutional: Negative for chills, fatigue and fever.   Respiratory: Negative for cough, shortness of breath and wheezing.    Cardiovascular: Negative for leg swelling.   Gastrointestinal: Negative for abdominal pain, nausea and vomiting.   Musculoskeletal: Negative for back pain and joint swelling.   Neurological: Negative for dizziness and headaches.   Psychiatric/Behavioral: Negative for confusion.       Physical Exam:     Physical Exam   Constitutional: He is oriented to person, place, and time. He appears well-developed and well-nourished. No distress.   Abdominal: Soft. Bowel sounds are normal. He exhibits no distension and no mass. There is no tenderness. There is no rebound and no guarding. No hernia.   Genitourinary: Rectum normal, prostate normal and penis normal.   Musculoskeletal: Normal range of motion.   Neurological: He is alert and oriented to person, place, and time.   Skin: Skin is warm and dry. No rash noted. He is not diaphoretic. No pallor.   Psychiatric: He has a normal mood and affect. His behavior is normal. Judgment and thought content normal.   Nursing note and vitals reviewed.      Procedure:      No notes on file      Assessment:     Encounter Diagnoses   Name Primary?   • Low testosterone Yes   • Chronic fatigue      Orders Placed This Encounter   Procedures   • POC Urinalysis Dipstick, Automated       Plan:   Reviewed the labs with the patient revealing the continued low testosterone level and patient will be started on testosterone replacement of testosterone cypionate 200 mg/mL-2 mL IM given today with his return appointment in 2 weeks for his next injection. He will also be scheduled with Dr. Quinn for evaluation for a vasectomy per his request.    Counseling was given to patient for the following topics diagnostic results, patient and family education, impressions and risks and benefits of treatment options. and the interim medical history and current results were reviewed.  A treatment plan with follow-up was made. Total time of the encounter was 20 minutes and 20 minutes were spent discussing Low testosterone [E34.9] face-to-face.       This document has been electronically signed by ALONDRA Coffey October 24, 2017 12:58 PM

## 2017-11-02 ENCOUNTER — TELEPHONE (OUTPATIENT)
Dept: FAMILY MEDICINE CLINIC | Facility: CLINIC | Age: 37
End: 2017-11-02

## 2017-11-02 RX ORDER — DIAZEPAM 5 MG/1
5 TABLET ORAL 2 TIMES DAILY PRN
Qty: 30 TABLET | Refills: 0 | OUTPATIENT
Start: 2017-11-02 | End: 2017-12-12

## 2017-11-02 NOTE — TELEPHONE ENCOUNTER
Eli called reports he is out of his valium,that his nurse practitoner at the [facility] was supposed to talk to you & you all decide who was going to continue  Writing it ? But now he is out & needs it,debbie on your desk.

## 2017-11-02 NOTE — TELEPHONE ENCOUNTER
Ok to refill Ask him to discuss with psych as they want to assume the prescribing if felt necessary in his treatment plan.    [facility] # [id number]

## 2017-11-02 NOTE — TELEPHONE ENCOUNTER
Ok to refill Ask him to discuss with psych as they want to assume the prescribing if felt necessary in his treatment plan.    [facility] # [id number]      Rx called to pharmacy as requested & Eli notified.

## 2017-11-07 ENCOUNTER — CLINICAL SUPPORT (OUTPATIENT)
Dept: UROLOGY | Facility: CLINIC | Age: 37
End: 2017-11-07

## 2017-11-07 ENCOUNTER — OFFICE VISIT (OUTPATIENT)
Dept: UROLOGY | Facility: CLINIC | Age: 37
End: 2017-11-07

## 2017-11-07 ENCOUNTER — OFFICE VISIT (OUTPATIENT)
Dept: PSYCHIATRY | Facility: CLINIC | Age: 37
End: 2017-11-07

## 2017-11-07 VITALS
DIASTOLIC BLOOD PRESSURE: 85 MMHG | HEART RATE: 75 BPM | WEIGHT: 312.6 LBS | HEIGHT: 69 IN | SYSTOLIC BLOOD PRESSURE: 130 MMHG | BODY MASS INDEX: 46.3 KG/M2

## 2017-11-07 DIAGNOSIS — Z98.52 VASECTOMY STATUS: Primary | ICD-10-CM

## 2017-11-07 DIAGNOSIS — R79.89 LOW TESTOSTERONE: Primary | ICD-10-CM

## 2017-11-07 DIAGNOSIS — F39 UNSPECIFIED MOOD (AFFECTIVE) DISORDER (HCC): Primary | ICD-10-CM

## 2017-11-07 PROCEDURE — 99213 OFFICE O/P EST LOW 20 MIN: CPT | Performed by: NURSE PRACTITIONER

## 2017-11-07 PROCEDURE — 99203 OFFICE O/P NEW LOW 30 MIN: CPT | Performed by: UROLOGY

## 2017-11-07 PROCEDURE — 96372 THER/PROPH/DIAG INJ SC/IM: CPT | Performed by: NURSE PRACTITIONER

## 2017-11-07 RX ORDER — PROPRANOLOL HYDROCHLORIDE 10 MG/1
10 TABLET ORAL 2 TIMES DAILY PRN
Qty: 60 TABLET | Refills: 0 | Status: SHIPPED | OUTPATIENT
Start: 2017-11-07 | End: 2017-12-12

## 2017-11-07 RX ORDER — TESTOSTERONE CYPIONATE 200 MG/ML
400 INJECTION, SOLUTION INTRAMUSCULAR ONCE
Status: COMPLETED | OUTPATIENT
Start: 2017-11-07 | End: 2017-11-07

## 2017-11-07 RX ORDER — LAMOTRIGINE 100 MG/1
100 TABLET ORAL DAILY
Qty: 30 TABLET | Refills: 0 | Status: SHIPPED | OUTPATIENT
Start: 2017-11-07 | End: 2017-12-12 | Stop reason: SDUPTHER

## 2017-11-07 RX ADMIN — TESTOSTERONE CYPIONATE 400 MG: 200 INJECTION, SOLUTION INTRAMUSCULAR at 13:39

## 2017-11-07 NOTE — PROGRESS NOTES
Chief Complaint:          Chief Complaint   Patient presents with   • Vasctomy Consult       HPI:   37 y.o. male.  36-year-old white male with 2 children 13 and 18.Pre-vasectomy consultation-we discussed the anatomy of the testicle and vas deferens.  I discussed this as an outpatient procedure done in the office under local anesthetic.  I discussed the significant risk factors of anesthesia, bleeding, infection, need for revision or   Hematoma.  But most important and discussed the 1 in 10,000 failure rate secondary to spontaneous recanalization in the important fact that you're still fertile after the procedure for up to 2 months.  We discussed the rare risk of testalgia postoperatively rare association of low testosterone with a vasectomy.  I discussed the fact that it is reversible however the success rate is best if done before 10 years and approximately is 57% and most hands.        Past Medical History:        Past Medical History:   Diagnosis Date   • Anger    • Anxiety    • Arthritis    • CHF (congestive heart failure)    • Chronic back pain    • Depression    • Fatigue    • History of heart murmur in childhood    • Hyperlipidemia    • Hypertension    • Hypothyroidism    • Obesity    • Positive urine drug screen 03/2016   • Screening PSA (prostate specific antigen) 2014         Current Meds:     Current Outpatient Prescriptions   Medication Sig Dispense Refill   • amoxicillin-clavulanate (AUGMENTIN) 875-125 MG per tablet Take 1 tablet by mouth 2 (Two) Times a Day. 20 tablet 0   • diazePAM (VALIUM) 5 MG tablet Take 1 tablet by mouth 2 (Two) Times a Day As Needed for Anxiety. 30 tablet 0   • escitalopram (LEXAPRO) 20 MG tablet Take 1 tablet by mouth Daily. 30 tablet 5   • lamoTRIgine (LaMICtal) 100 MG tablet Take 1 tablet by mouth Daily. 30 tablet 0   • levothyroxine (SYNTHROID, LEVOTHROID) 150 MCG tablet Take 1 tablet by mouth Daily. 30 tablet 5   • lisinopril (PRINIVIL,ZESTRIL) 5 MG tablet Take 2 tablets by  mouth Daily. 30 tablet 5   • propranolol (INDERAL) 10 MG tablet Take 1 tablet by mouth 2 (Two) Times a Day As Needed (anxiety). 60 tablet 0   • Testosterone 75 MG pellet Place 75 mg on the skin Daily. 30 each 0     No current facility-administered medications for this visit.         Allergies:      No Known Allergies     Past Surgical History:     Past Surgical History:   Procedure Laterality Date   • ABDOMINAL SURGERY     • CHOLECYSTECTOMY WITH INTRAOPERATIVE CHOLANGIOGRAM N/A 9/5/2017    Procedure: CHOLECYSTECTOMY LAPAROSCOPIC INTRAOPERATIVE CHOLANGIOGRAM;  Surgeon: Joni Dawn MD;  Location: [facility];  Service:    • GASTRIC BANDING  08/21/2016   • WISDOM TOOTH EXTRACTION           Social History:     Social History     Social History   • Marital status:      Spouse name: N/A   • Number of children: N/A   • Years of education: N/A     Occupational History   •       Social History Main Topics   • Smoking status: Never Smoker   • Smokeless tobacco: Current User     Types: Snuff   • Alcohol use No   • Drug use: No   • Sexual activity: Defer     Other Topics Concern   • Not on file     Social History Narrative       Family History:     Family History   Problem Relation Age of Onset   • Cancer Mother      breast   • Lymphoma Father    • Cancer Sister      lung        Review of Systems:     Review of Systems   Constitutional: Negative.  Negative for chills, fatigue and fever.   HENT: Negative.    Eyes: Negative.    Respiratory: Negative.  Negative for cough, shortness of breath and wheezing.    Cardiovascular: Negative.  Negative for leg swelling.   Gastrointestinal: Negative.  Negative for abdominal pain, nausea and vomiting.   Endocrine: Negative.    Musculoskeletal: Negative.  Negative for back pain and joint swelling.   Allergic/Immunologic: Negative.    Neurological: Negative.  Negative for dizziness and headaches.   Hematological: Negative.    Psychiatric/Behavioral: Negative.   Negative for confusion.       Physical Exam:     Physical Exam   Constitutional: He is oriented to person, place, and time. He appears well-developed and well-nourished.   HENT:   Head: Normocephalic and atraumatic.   Eyes: Conjunctivae and EOM are normal. Pupils are equal, round, and reactive to light.   Neck: Normal range of motion.   Cardiovascular: Normal rate, regular rhythm, normal heart sounds and intact distal pulses.    Pulmonary/Chest: Effort normal and breath sounds normal.   Abdominal: Soft. Bowel sounds are normal.   Genitourinary: Penis normal.   Musculoskeletal: Normal range of motion.   Neurological: He is alert and oriented to person, place, and time. He has normal reflexes.   Skin: Skin is warm and dry.   Psychiatric: He has a normal mood and affect. His behavior is normal. Judgment and thought content normal.   Nursing note and vitals reviewed.      Procedure:       Assessment:   No diagnosis found.  No orders of the defined types were placed in this encounter.      Plan:   Pre-vasectomy consultation-we discussed the anatomy of the testicle and vas deferens.  I discussed this as an outpatient procedure done in the office under local anesthetic.  I discussed the significant risk factors of anesthesia, bleeding, infection, need for revision or   Hematoma.  But most important and discussed the 1 in 10,000 failure rate secondary to spontaneous recanalization in the important fact that you're still fertile after the procedure for up to 2 months.  We discussed the rare risk of testalgia postoperatively rare association of low testosterone with a vasectomy.  I discussed the fact that it is reversible however the success rate is best if done before 10 years and approximately is 57% and most hands.           This document has been electronically signed by DIANE EWLCH MD November 7, 2017 1:50 PM

## 2017-11-07 NOTE — PROGRESS NOTES
Patient given a prescription for testosterone cypionate - to inject 2 mL IM every 2 weeks # 4 mL with no refills - for history of low testosterone.

## 2017-11-07 NOTE — PROGRESS NOTES
Subjective   Brady Mcknight is a 37 y.o. male is here today for medication management follow-up at [facility], he presents to his appointment on time.     Chief Complaint:      History of Present Illness  He states that he is not able to tell how he has done on his lamictal, he continues to have issues with frustration and anger.  He states that he doesn't believe that his anxiety is as bad as the frustration, if he is having a bad day he has issues with getting his thoughts being complete.  He feels like the diazepam is effective in treating his levels of frustration.  He rates his depression 4/10, anxiety 4/10 with 10 being the worse.  He denies any SE or problems with the medications, however he does report that he is having some issues with sexual dysfunction.  He states that he is averaging between 8 hours but it is interrupted and doesn't feel rested.  He denies any NM.  Appetite is good with about 4 pound weight loss.  He states that he has chronic diarrhea with colonscopy to be scheduled.  He is stressed because he is not work.  Denies any AV hallucinations, denies any SI/HI.      The following portions of the patient's history were reviewed and updated as appropriate: allergies, current medications, past family history, past medical history, past social history, past surgical history and problem list.    Review of Systems   Constitutional: Negative for appetite change, chills, diaphoresis, fatigue, fever and unexpected weight change.   HENT: Negative for hearing loss, sore throat, trouble swallowing and voice change.    Eyes: Negative for photophobia and visual disturbance.   Respiratory: Negative for cough, chest tightness and shortness of breath.    Cardiovascular: Negative for chest pain and palpitations.   Gastrointestinal: Negative for abdominal pain, constipation, nausea and vomiting.   Endocrine: Negative for cold intolerance and heat intolerance.   Genitourinary: Negative for dysuria and  "frequency.   Musculoskeletal: Negative for arthralgias, back pain, joint swelling and neck stiffness.   Skin: Negative for color change and wound.   Allergic/Immunologic: Negative for environmental allergies and immunocompromised state.   Neurological: Negative for dizziness, tremors, seizures, syncope, weakness, light-headedness and headaches.   Hematological: Negative for adenopathy. Does not bruise/bleed easily.       Objective   Physical Exam   Constitutional: He appears well-developed and well-nourished. No distress.   Neurological: He is alert. Coordination and gait normal.   Vitals reviewed.    Blood pressure 130/85, pulse 75, height 69\" (175.3 cm), weight (!) 312 lb 9.6 oz (142 kg).    Medication List:   Current Outpatient Prescriptions   Medication Sig Dispense Refill   • amoxicillin-clavulanate (AUGMENTIN) 875-125 MG per tablet Take 1 tablet by mouth 2 (Two) Times a Day. 20 tablet 0   • diazePAM (VALIUM) 5 MG tablet Take 1 tablet by mouth 2 (Two) Times a Day As Needed for Anxiety. 30 tablet 0   • escitalopram (LEXAPRO) 20 MG tablet Take 1 tablet by mouth Daily. 30 tablet 5   • lamoTRIgine (LaMICtal) 100 MG tablet Take 1 tablet by mouth Daily. 30 tablet 0   • levothyroxine (SYNTHROID, LEVOTHROID) 150 MCG tablet Take 1 tablet by mouth Daily. 30 tablet 5   • lisinopril (PRINIVIL,ZESTRIL) 5 MG tablet Take 2 tablets by mouth Daily. 30 tablet 5   • propranolol (INDERAL) 10 MG tablet Take 1 tablet by mouth 2 (Two) Times a Day As Needed (anxiety). 60 tablet 0   • Testosterone 75 MG pellet Place 75 mg on the skin Daily. 30 each 0     No current facility-administered medications for this visit.        Mental Status Exam:   Hygiene:   good  Cooperation:  Cooperative  Eye Contact:  Fair  Psychomotor Behavior:  Appropriate  Affect:  Appropriate  Hopelessness: 3  Speech:  Normal  Thought Process:  Linear  Thought Content:  Mood congurent  Suicidal:  None  Homicidal:  None  Hallucinations:  None  Delusion:  None  Memory: "  Intact  Orientation:  Person, Place, Time and Situation  Reliability:  fair  Insight:  Fair  Judgement:  Fair  Impulse Control:  Fair  Physical/Medical Issues:  No     Assessment/Plan   Problems Addressed this Visit     None      Visit Diagnoses     Unspecified mood (affective) disorder    -  Primary        Increase lamictal 100mg daily for mood  Begin propranolol 10mg bid prn anxiety- this is to be used in place of diazepam (has supply)  Recommended to decrease the lexapro 20mg to 1/2 tab related to SE- sexual dysfunction    Discussed medication options.  Reviewed the risks, benefits, and side effects of the medications; patient acknowledged and verbally consented.  Patient is agreeable to call the [facility].  Patient is aware to call 911 or go to the nearest ER should begin having SI/HI.  Recommended therapy to address coping skills.    Prognosis: Guarded dependent on medication, follow up appointment and treatment plan compliance   Functionality:  Fair. Depression and anxiety is impacting the ability to maintain employment.      Return in 4 weeks

## 2017-11-13 ENCOUNTER — HOSPITAL ENCOUNTER (OUTPATIENT)
Dept: GENERAL RADIOLOGY | Facility: HOSPITAL | Age: 37
Discharge: HOME | End: 2017-11-13
Admitting: PHYSICIAN ASSISTANT

## 2017-11-13 ENCOUNTER — CONSULT (OUTPATIENT)
Dept: GASTROENTEROLOGY | Facility: CLINIC | Age: 37
End: 2017-11-13

## 2017-11-13 VITALS
HEIGHT: 69 IN | OXYGEN SATURATION: 97 % | SYSTOLIC BLOOD PRESSURE: 114 MMHG | HEART RATE: 73 BPM | DIASTOLIC BLOOD PRESSURE: 78 MMHG | WEIGHT: 312.8 LBS | BODY MASS INDEX: 46.33 KG/M2

## 2017-11-13 DIAGNOSIS — R10.9 ABDOMINAL CRAMPING: ICD-10-CM

## 2017-11-13 DIAGNOSIS — R19.7 DIARRHEA, UNSPECIFIED TYPE: Primary | ICD-10-CM

## 2017-11-13 PROCEDURE — 99243 OFF/OP CNSLTJ NEW/EST LOW 30: CPT | Performed by: PHYSICIAN ASSISTANT

## 2017-11-13 PROCEDURE — 74000 XR ABDOMEN KUB: CPT | Performed by: RADIOLOGY

## 2017-11-13 PROCEDURE — 74000 HC ABDOMEN KUB: CPT

## 2017-11-13 NOTE — PROGRESS NOTES
Chief Complaint   Patient presents with   • Diarrhea     Brady Mcknight is a 37 y.o. male who presents to the office today at the request of ALONDRA Li for Diarrhea.    HPI    The patient was seen for a GI evaluation due to diarrhea.  He takes 5-7 imodium D tablets and still has watery diarrhea twice per day.  This has been occurring for the past year.  He had two rounds of antibiotics last year.  Patient denies nausea, vomiting, abdominal pain, hematochezia and melena.  Patient does report abdominal cramping with diarrhea episodes and reports that it has a strong odor.   Patient had a cholecystectomy a few months ago and reports that it did not affect his diarrhea.  He has never had a colonoscopy.  Patient had a lap band surgery last year.  Medical, surgical, social, and family histories were reviewed and are listed below.    Review of Systems   Constitutional: Negative for chills, fatigue and fever.   HENT: Positive for congestion, sore throat, trouble swallowing and voice change.    Eyes: Negative for pain and visual disturbance.   Respiratory: Negative for cough, shortness of breath and wheezing.    Cardiovascular: Negative for chest pain, palpitations and leg swelling.   Gastrointestinal: Positive for diarrhea. Negative for abdominal distention, abdominal pain, anal bleeding, blood in stool, constipation, nausea, rectal pain and vomiting.   Endocrine: Negative for cold intolerance and heat intolerance.   Genitourinary: Negative for difficulty urinating and frequency.   Musculoskeletal: Negative for arthralgias, back pain and myalgias.   Skin: Negative for rash and wound.   Allergic/Immunologic: Positive for environmental allergies. Negative for food allergies.   Neurological: Positive for dizziness and headaches.   Hematological: Does not bruise/bleed easily.   Psychiatric/Behavioral: Positive for sleep disturbance. The patient is nervous/anxious.        ACTIVE PROBLEMS:   Specialty Problems     None           PAST MEDICAL HISTORY:  Past Medical History:   Diagnosis Date   • Anger    • Anxiety    • Arthritis    • CHF (congestive heart failure)    • Chronic back pain    • Depression    • Fatigue    • History of heart murmur in childhood    • Hyperlipidemia    • Hypertension    • Hypothyroidism    • Obesity    • Positive urine drug screen 03/2016   • Screening PSA (prostate specific antigen) 2014       SURGICAL HISTORY:  Past Surgical History:   Procedure Laterality Date   • ABDOMINAL SURGERY     • CHOLECYSTECTOMY     • CHOLECYSTECTOMY WITH INTRAOPERATIVE CHOLANGIOGRAM N/A 9/5/2017    Procedure: CHOLECYSTECTOMY LAPAROSCOPIC INTRAOPERATIVE CHOLANGIOGRAM;  Surgeon: Joni Dawn MD;  [facility];  Service:    • GASTRIC BANDING  08/21/2016   • WISDOM TOOTH EXTRACTION         FAMILY HISTORY:  Family History   Problem Relation Age of Onset   • Cancer Mother      breast   • Lymphoma Father    • Cancer Sister      lung        SOCIAL HISTORY:  Social History   Substance Use Topics   • Smoking status: Never Smoker   • Smokeless tobacco: Current User     Types: Snuff   • Alcohol use No       CURRENT MEDICATION:    Current Outpatient Prescriptions:   •  diazePAM (VALIUM) 5 MG tablet, Take 1 tablet by mouth 2 (Two) Times a Day As Needed for Anxiety., Disp: 30 tablet, Rfl: 0  •  escitalopram (LEXAPRO) 20 MG tablet, Take 1 tablet by mouth Daily., Disp: 30 tablet, Rfl: 5  •  lamoTRIgine (LaMICtal) 100 MG tablet, Take 1 tablet by mouth Daily., Disp: 30 tablet, Rfl: 0  •  levothyroxine (SYNTHROID, LEVOTHROID) 150 MCG tablet, Take 1 tablet by mouth Daily., Disp: 30 tablet, Rfl: 5  •  lisinopril (PRINIVIL,ZESTRIL) 5 MG tablet, Take 2 tablets by mouth Daily., Disp: 30 tablet, Rfl: 5  •  propranolol (INDERAL) 10 MG tablet, Take 1 tablet by mouth 2 (Two) Times a Day As Needed (anxiety)., Disp: 60 tablet, Rfl: 0  •  Testosterone 75 MG pellet, Place 75 mg on the skin Daily., Disp: 30 each, Rfl: 0  •  amoxicillin-clavulanate  "(AUGMENTIN) 875-125 MG per tablet, Take 1 tablet by mouth 2 (Two) Times a Day., Disp: 20 tablet, Rfl: 0    ALLERGIES:  Review of patient's allergies indicates no known allergies.    VISIT VITALS:  /78  Pulse 73  Ht 69\" (175.3 cm)  Wt (!) 312 lb 12.8 oz (142 kg)  SpO2 97%  BMI 46.19 kg/m2    PHYSICAL EXAMINATION:  Physical Exam   Constitutional: He is oriented to person, place, and time. He appears well-developed and well-nourished. No distress.   HENT:   Head: Normocephalic and atraumatic.   Right Ear: External ear normal.   Left Ear: External ear normal.   Nose: Nose normal.   Mouth/Throat: Oropharynx is clear and moist. No oropharyngeal exudate.   Eyes: Conjunctivae and EOM are normal. Pupils are equal, round, and reactive to light. Right eye exhibits no discharge. Left eye exhibits no discharge. No scleral icterus.   Neck: Normal range of motion. Neck supple. No JVD present. No tracheal deviation present. No thyromegaly present.   Cardiovascular: Normal rate, regular rhythm, normal heart sounds and intact distal pulses.  Exam reveals no gallop and no friction rub.    No murmur heard.  Pulmonary/Chest: Effort normal and breath sounds normal. No stridor. No respiratory distress. He has no wheezes. He has no rales. He exhibits no tenderness.   Abdominal: Soft. Bowel sounds are normal. He exhibits no distension and no mass. There is no tenderness. There is no rebound and no guarding. No hernia.   Musculoskeletal: He exhibits no edema, tenderness or deformity.   Lymphadenopathy:     He has no cervical adenopathy.   Neurological: He is alert and oriented to person, place, and time. He has normal reflexes. He displays normal reflexes. No cranial nerve deficit. He exhibits normal muscle tone. Coordination normal.   Skin: Skin is warm and dry. No rash noted. He is not diaphoretic. No erythema. No pallor.   Psychiatric: He has a normal mood and affect. His behavior is normal. Judgment and thought content normal. "       Assessment/Plan      Diagnosis Plan   1. Diarrhea, unspecified type  Clostridium Difficile Toxin - Stool, Per Rectum    Ova & Parasite Result - Stool, Per Rectum    Stool Culture Result    Strongyloides Antibody IgG, MERNA    H. Pylori Antigen, Stool - Stool, Per Rectum    XR Abdomen KUB   2. Abdominal cramping  Clostridium Difficile Toxin - Stool, Per Rectum    Ova & Parasite Result - Stool, Per Rectum    Stool Culture Result    Strongyloides Antibody IgG, MERNA    H. Pylori Antigen, Stool - Stool, Per Rectum    XR Abdomen KUB     The patient will have stool studies and an abdominal xray due to severe diarrhea.  He will return in one week.  Patient voiced understanding and agreement of recommendations.  Return in about 1 week (around 11/20/2017) for Recheck.           HARRY Murillo

## 2017-11-28 ENCOUNTER — LAB (OUTPATIENT)
Dept: LAB | Facility: HOSPITAL | Age: 37
End: 2017-11-28

## 2017-11-28 DIAGNOSIS — R19.7 DIARRHEA, UNSPECIFIED TYPE: Primary | ICD-10-CM

## 2017-11-28 DIAGNOSIS — R19.7 DIARRHEA, UNSPECIFIED TYPE: ICD-10-CM

## 2017-11-28 LAB
027 TOXIN: NORMAL
C DIFF TOX GENS STL QL NAA+PROBE: NEGATIVE

## 2017-11-28 PROCEDURE — 87046 STOOL CULTR AEROBIC BACT EA: CPT

## 2017-11-28 PROCEDURE — 87899 AGENT NOS ASSAY W/OPTIC: CPT

## 2017-11-28 PROCEDURE — 87338 HPYLORI STOOL AG IA: CPT | Performed by: PHYSICIAN ASSISTANT

## 2017-11-28 PROCEDURE — 87177 OVA AND PARASITES SMEARS: CPT

## 2017-11-28 PROCEDURE — 87209 SMEAR COMPLEX STAIN: CPT

## 2017-11-28 PROCEDURE — 86682 HELMINTH ANTIBODY: CPT | Performed by: PHYSICIAN ASSISTANT

## 2017-11-28 PROCEDURE — 87045 FECES CULTURE AEROBIC BACT: CPT

## 2017-11-28 PROCEDURE — 87493 C DIFF AMPLIFIED PROBE: CPT

## 2017-11-30 LAB
BACTERIA SPEC AEROBE CULT: NORMAL
H PYLORI AG STL QL IA: NEGATIVE
O+P SPEC MICRO: NORMAL
OVA + PARASITE RESULT 1: NORMAL

## 2017-12-01 LAB — STRONGYLOIDES AB SER-ACNC: NEGATIVE

## 2017-12-11 ENCOUNTER — OFFICE VISIT (OUTPATIENT)
Dept: GASTROENTEROLOGY | Facility: CLINIC | Age: 37
End: 2017-12-11

## 2017-12-11 ENCOUNTER — LAB (OUTPATIENT)
Dept: LAB | Facility: HOSPITAL | Age: 37
End: 2017-12-11

## 2017-12-11 VITALS
WEIGHT: 314 LBS | OXYGEN SATURATION: 96 % | DIASTOLIC BLOOD PRESSURE: 84 MMHG | SYSTOLIC BLOOD PRESSURE: 136 MMHG | HEART RATE: 73 BPM | HEIGHT: 70 IN | BODY MASS INDEX: 44.95 KG/M2

## 2017-12-11 DIAGNOSIS — R19.7 DIARRHEA, UNSPECIFIED TYPE: ICD-10-CM

## 2017-12-11 DIAGNOSIS — R15.9 FULL INCONTINENCE OF FECES: ICD-10-CM

## 2017-12-11 DIAGNOSIS — R19.4 CHANGE IN BOWEL HABITS: ICD-10-CM

## 2017-12-11 DIAGNOSIS — K58.0 IRRITABLE BOWEL SYNDROME WITH DIARRHEA: Primary | ICD-10-CM

## 2017-12-11 DIAGNOSIS — R10.9 ABDOMINAL CRAMPING: ICD-10-CM

## 2017-12-11 LAB
ALBUMIN SERPL-MCNC: 4.4 G/DL (ref 3.5–5)
ALBUMIN/GLOB SERPL: 1.3 G/DL (ref 1.5–2.5)
ALP SERPL-CCNC: 82 U/L (ref 40–129)
ALT SERPL W P-5'-P-CCNC: 20 U/L (ref 10–44)
AMYLASE SERPL-CCNC: 50 U/L (ref 28–100)
ANION GAP SERPL CALCULATED.3IONS-SCNC: 7.4 MMOL/L (ref 3.6–11.2)
AST SERPL-CCNC: 22 U/L (ref 10–34)
BASOPHILS # BLD AUTO: 0.04 10*3/MM3 (ref 0–0.3)
BASOPHILS NFR BLD AUTO: 0.4 % (ref 0–2)
BILIRUB SERPL-MCNC: 0.3 MG/DL (ref 0.2–1.8)
BUN BLD-MCNC: 12 MG/DL (ref 7–21)
BUN/CREAT SERPL: 13.3 (ref 7–25)
CALCIUM SPEC-SCNC: 9.1 MG/DL (ref 7.7–10)
CHLORIDE SERPL-SCNC: 106 MMOL/L (ref 99–112)
CO2 SERPL-SCNC: 27.6 MMOL/L (ref 24.3–31.9)
CREAT BLD-MCNC: 0.9 MG/DL (ref 0.43–1.29)
CRP SERPL-MCNC: 0.62 MG/DL (ref 0–0.99)
DEPRECATED RDW RBC AUTO: 43.6 FL (ref 37–54)
EOSINOPHIL # BLD AUTO: 0.21 10*3/MM3 (ref 0–0.7)
EOSINOPHIL NFR BLD AUTO: 2.1 % (ref 0–5)
ERYTHROCYTE [DISTWIDTH] IN BLOOD BY AUTOMATED COUNT: 13.6 % (ref 11.5–14.5)
GFR SERPL CREATININE-BSD FRML MDRD: 94 ML/MIN/1.73
GLOBULIN UR ELPH-MCNC: 3.3 GM/DL
GLUCOSE BLD-MCNC: 82 MG/DL (ref 70–110)
HCT VFR BLD AUTO: 44.7 % (ref 42–52)
HGB BLD-MCNC: 15.1 G/DL (ref 14–18)
IMM GRANULOCYTES # BLD: 0.01 10*3/MM3 (ref 0–0.03)
IMM GRANULOCYTES NFR BLD: 0.1 % (ref 0–0.5)
LIPASE SERPL-CCNC: 26 U/L (ref 13–60)
LYMPHOCYTES # BLD AUTO: 3.23 10*3/MM3 (ref 1–3)
LYMPHOCYTES NFR BLD AUTO: 32 % (ref 21–51)
MCH RBC QN AUTO: 30.4 PG (ref 27–33)
MCHC RBC AUTO-ENTMCNC: 33.8 G/DL (ref 33–37)
MCV RBC AUTO: 89.9 FL (ref 80–94)
MONOCYTES # BLD AUTO: 0.89 10*3/MM3 (ref 0.1–0.9)
MONOCYTES NFR BLD AUTO: 8.8 % (ref 0–10)
NEUTROPHILS # BLD AUTO: 5.7 10*3/MM3 (ref 1.4–6.5)
NEUTROPHILS NFR BLD AUTO: 56.6 % (ref 30–70)
OSMOLALITY SERPL CALC.SUM OF ELEC: 280.1 MOSM/KG (ref 273–305)
PLATELET # BLD AUTO: 294 10*3/MM3 (ref 130–400)
PMV BLD AUTO: 10.4 FL (ref 6–10)
POTASSIUM BLD-SCNC: 3.9 MMOL/L (ref 3.5–5.3)
PROT SERPL-MCNC: 7.7 G/DL (ref 6–8)
RBC # BLD AUTO: 4.97 10*6/MM3 (ref 4.7–6.1)
SODIUM BLD-SCNC: 141 MMOL/L (ref 135–153)
WBC NRBC COR # BLD: 10.08 10*3/MM3 (ref 4.5–12.5)

## 2017-12-11 PROCEDURE — 36415 COLL VENOUS BLD VENIPUNCTURE: CPT

## 2017-12-11 PROCEDURE — 83516 IMMUNOASSAY NONANTIBODY: CPT

## 2017-12-11 PROCEDURE — 99214 OFFICE O/P EST MOD 30 MIN: CPT | Performed by: PHYSICIAN ASSISTANT

## 2017-12-11 PROCEDURE — 82150 ASSAY OF AMYLASE: CPT

## 2017-12-11 PROCEDURE — 85025 COMPLETE CBC W/AUTO DIFF WBC: CPT

## 2017-12-11 PROCEDURE — 80053 COMPREHEN METABOLIC PANEL: CPT

## 2017-12-11 PROCEDURE — 86140 C-REACTIVE PROTEIN: CPT

## 2017-12-11 PROCEDURE — 83690 ASSAY OF LIPASE: CPT

## 2017-12-11 NOTE — PROGRESS NOTES
Chief Complaint   Patient presents with   • Diarrhea       Brady Mcknight is a 37 y.o. male who presents to the office today for follow up appointment regarding Diarrhea.    HPI  He is taking Imodium 8-10 times per day due to severe diarrhea. Since his last appointment, symptoms have remained the same. He changed to name brand imodium and this did not seem to help. He has already took several today. His stools are completely watery and will decrease in frequency when he takes the medication. Sometimes he will skip 1 day without a bowel movement if he takes too much imodium but then immediately returns to his normal after 1 formed stool. Diarrhea has been present for the past 2 years. He will even awake at night sometimes with diarrhea. He will have up to 7 stools per day. With imodium, he has 2 watery stools. He will have so much diarrhea at times that it causes rectal bleeding. Stools are described as very watery and not even brown at times. Fecal incontinence is present and he has to come home from work to change clothes. He has had recent antibiotics including azithromycin and rocephin. Abdominal pain is generalized and urgency is present. Abdominal pain will be sudden and sporadic and he knows that he must run to the bathroom. Borborygmi noted. Denies heartburn, acid reflux, nausea. He is s/p lap band and has lost 100 lbs in the past 1 year. Appetite is good. Never had a colonoscopy. He took Citrucel for a while (he bought over the counter as recommended by PCP) but that did not seem to help at all.     Stool studies 11/28/2017 negative to include C diff, H pylori, strongyloides, stool culture and O&P. Abdominal film 11/13/2017 was normal and port noted from gastric band placement.     Review of Systems   Constitutional: Negative for chills, fatigue and fever.   HENT: Positive for congestion, sore throat, trouble swallowing and voice change.    Eyes: Negative for pain and visual disturbance.   Respiratory: Negative  for cough, shortness of breath and wheezing.    Cardiovascular: Negative for chest pain, palpitations and leg swelling.   Gastrointestinal: Positive for diarrhea. Negative for abdominal distention, abdominal pain, anal bleeding, blood in stool, constipation, nausea, rectal pain and vomiting.   Endocrine: Negative for cold intolerance and heat intolerance.   Genitourinary: Negative for difficulty urinating and frequency.   Musculoskeletal: Negative for arthralgias, back pain and myalgias.   Skin: Negative for rash and wound.   Allergic/Immunologic: Positive for environmental allergies. Negative for food allergies.   Neurological: Positive for dizziness and headaches.   Hematological: Does not bruise/bleed easily.   Psychiatric/Behavioral: Positive for sleep disturbance. The patient is nervous/anxious.        Past Medical History:   Diagnosis Date   • Anger    • Anxiety    • Arthritis    • CHF (congestive heart failure)    • Chronic back pain    • Depression    • Fatigue    • History of heart murmur in childhood    • Hyperlipidemia    • Hypertension    • Hypothyroidism    • Obesity    • Positive urine drug screen 03/2016   • Screening PSA (prostate specific antigen) 2014       Past Surgical History:   Procedure Laterality Date   • ABDOMINAL SURGERY     • CHOLECYSTECTOMY     • CHOLECYSTECTOMY WITH INTRAOPERATIVE CHOLANGIOGRAM N/A 9/5/2017    Procedure: CHOLECYSTECTOMY LAPAROSCOPIC INTRAOPERATIVE CHOLANGIOGRAM;  Surgeon: Joni Dawn MD;  Location: [facility];  Service:    • GASTRIC BANDING  08/21/2016   • WISDOM TOOTH EXTRACTION         Family History   Problem Relation Age of Onset   • Cancer Mother      breast   • Lymphoma Father    • Cancer Sister      lung        Social History   Substance Use Topics   • Smoking status: Never Smoker   • Smokeless tobacco: Current User     Types: Snuff   • Alcohol use No       CURRENT MEDICATION:  •  diazePAM (VALIUM) 5 MG tablet, Take 1 tablet by mouth 2 (Two) Times a Day As  "Needed for Anxiety., Disp: 30 tablet, Rfl: 0  •  escitalopram (LEXAPRO) 20 MG tablet, Take 1 tablet by mouth Daily., Disp: 30 tablet, Rfl: 5  •  lamoTRIgine (LaMICtal) 100 MG tablet, Take 1 tablet by mouth Daily., Disp: 30 tablet, Rfl: 0  •  levothyroxine (SYNTHROID, LEVOTHROID) 150 MCG tablet, Take 1 tablet by mouth Daily., Disp: 30 tablet, Rfl: 5  •  lisinopril (PRINIVIL,ZESTRIL) 5 MG tablet, Take 2 tablets by mouth Daily., Disp: 30 tablet, Rfl: 5  •  propranolol (INDERAL) 10 MG tablet, Take 1 tablet by mouth 2 (Two) Times a Day As Needed (anxiety)., Disp: 60 tablet, Rfl: 0  •  Testosterone 75 MG pellet, Place 75 mg on the skin Daily., Disp: 30 each, Rfl: 0    ALLERGIES:  Review of patient's allergies indicates no known allergies.    VISIT VITALS:  /84  Pulse 73  Ht 177.8 cm (70\")  Wt (!) 142 kg (314 lb)  SpO2 96%  BMI 45.05 kg/m2    Physical Exam   Constitutional: He is oriented to person, place, and time. He appears well-developed and well-nourished. No distress.   HENT:   Head: Normocephalic and atraumatic.   Right Ear: External ear normal.   Left Ear: External ear normal.   Nose: Nose normal.   Mouth/Throat: Oropharynx is clear and moist.   Eyes: Conjunctivae and EOM are normal. Right eye exhibits no discharge. Left eye exhibits no discharge. No scleral icterus.   Neck: Normal range of motion. Neck supple.   Cardiovascular: Normal rate, regular rhythm and normal heart sounds.  Exam reveals no gallop and no friction rub.    No murmur heard.  Pulmonary/Chest: Effort normal and breath sounds normal. No respiratory distress. He has no wheezes. He has no rales. He exhibits no tenderness.   Abdominal: Soft. Normal appearance and bowel sounds are normal. He exhibits no distension, no ascites and no mass. There is no tenderness. There is no rigidity and no guarding. No hernia.   Increased abdominal bowel sounds   Musculoskeletal: Normal range of motion. He exhibits no edema or deformity.   Neurological: He " is alert and oriented to person, place, and time. He exhibits normal muscle tone. Coordination normal.   Skin: Skin is warm and dry. No rash noted. No erythema. No pallor.   Psychiatric: He has a normal mood and affect. His behavior is normal. Judgment and thought content normal.   Nursing note and vitals reviewed.      Assessment/Plan     1. Irritable bowel syndrome with diarrhea    2. Abdominal cramping    3. Change in bowel habits    4. Diarrhea, unspecified type    5. Full incontinence of feces      I have recommended that he take Xifaxan 550 mg TID for 14 days as treatment for diarrhea. I do not think that it is likely that he only has IBS and so I have recommended further workup as well.     He will need a colonoscopy performed with IV general sedation. All of the risks, benefits and alternatives of this procedure have been discussed with him, all of his questions have been answered and he has elected to proceed. He should follow up in the office after this procedure to discuss the results and further recommendations can be made at that time.    Orders Placed This Encounter   Procedures   • Comprehensive Metabolic Panel   • Lipase   • Amylase   • C-reactive Protein   • Celiac Comprehensive Panel   • CBC & Differential     He was given sample of Konsyl and will take once daily as an effort to decrease watery diarrhea and fecal incontinence.           Return for follow up after procedure.       Nguyen Hodges PA-C       Electronically signed 12/11/2017 at 5:51 PM.

## 2017-12-12 ENCOUNTER — OFFICE VISIT (OUTPATIENT)
Dept: FAMILY MEDICINE CLINIC | Facility: CLINIC | Age: 37
End: 2017-12-12

## 2017-12-12 VITALS
BODY MASS INDEX: 44.81 KG/M2 | SYSTOLIC BLOOD PRESSURE: 106 MMHG | OXYGEN SATURATION: 98 % | WEIGHT: 313 LBS | HEIGHT: 70 IN | DIASTOLIC BLOOD PRESSURE: 71 MMHG | HEART RATE: 70 BPM

## 2017-12-12 DIAGNOSIS — B00.1 COLD SORE: ICD-10-CM

## 2017-12-12 DIAGNOSIS — J06.9 URI WITH COUGH AND CONGESTION: Primary | ICD-10-CM

## 2017-12-12 PROBLEM — K58.0 IRRITABLE BOWEL SYNDROME WITH DIARRHEA: Status: ACTIVE | Noted: 2017-12-12

## 2017-12-12 PROBLEM — R19.4 CHANGE IN BOWEL HABITS: Status: ACTIVE | Noted: 2017-12-12

## 2017-12-12 LAB
EXPIRATION DATE: NORMAL
FLUAV AG NPH QL: NORMAL
FLUBV AG NPH QL: NORMAL
INTERNAL CONTROL: NORMAL
Lab: NORMAL

## 2017-12-12 PROCEDURE — 99214 OFFICE O/P EST MOD 30 MIN: CPT | Performed by: NURSE PRACTITIONER

## 2017-12-12 PROCEDURE — 87804 INFLUENZA ASSAY W/OPTIC: CPT | Performed by: NURSE PRACTITIONER

## 2017-12-12 RX ORDER — LORATADINE 10 MG/1
10 TABLET ORAL DAILY
Qty: 30 TABLET | Refills: 5 | Status: SHIPPED | OUTPATIENT
Start: 2017-12-12 | End: 2018-06-26 | Stop reason: SDUPTHER

## 2017-12-12 RX ORDER — FLUTICASONE PROPIONATE 50 MCG
2 SPRAY, SUSPENSION (ML) NASAL DAILY
Qty: 1 BOTTLE | Refills: 5 | Status: SHIPPED | OUTPATIENT
Start: 2017-12-12 | End: 2018-11-05

## 2017-12-12 RX ORDER — AZITHROMYCIN 250 MG/1
TABLET, FILM COATED ORAL
Qty: 6 TABLET | Refills: 0 | Status: SHIPPED | OUTPATIENT
Start: 2017-12-12 | End: 2018-01-03

## 2017-12-12 RX ORDER — VALACYCLOVIR HYDROCHLORIDE 1 G/1
1000 TABLET, FILM COATED ORAL 2 TIMES DAILY
Qty: 8 TABLET | Refills: 1 | Status: SHIPPED | OUTPATIENT
Start: 2017-12-12 | End: 2018-05-01

## 2017-12-12 RX ORDER — LAMOTRIGINE 100 MG/1
100 TABLET ORAL DAILY
Qty: 30 TABLET | Refills: 0 | Status: SHIPPED | OUTPATIENT
Start: 2017-12-12 | End: 2017-12-18 | Stop reason: SDUPTHER

## 2017-12-12 NOTE — PROGRESS NOTES
Rosemary Mcknight is a 37 y.o. male.   Chief Compliant: The patient presents with Sore Throat and Sinus Problem    Sore Throat    This is a new problem. Episode onset: past 3 days  The problem has been gradually worsening. Neither side of throat is experiencing more pain than the other. The maximum temperature recorded prior to his arrival was 101 - 101.9 F. The fever has been present for 1 to 2 days. The pain is at a severity of 5/10. The pain is moderate. Associated symptoms include congestion and headaches. Pertinent negatives include no shortness of breath, trouble swallowing or vomiting. Associated symptoms comments: Cold sore past two days started as a blister  . Exposure to: exposed to the flu. He has tried NSAIDs, gargles and acetaminophen for the symptoms. The treatment provided mild relief.   Sinus Problem   This is a chronic problem. Episode onset: past several months states he always has drainage. Associated symptoms include congestion, headaches, sinus pressure and a sore throat. Pertinent negatives include no chills or shortness of breath. (Primarily post nasal drainage  ) Past treatments include saline sprays and oral decongestants. The treatment provided mild relief.      The following portions of the patient's history were reviewed and updated as appropriate: allergies, current medications, past family history, past medical history, past social history, past surgical history and problem list.      Review of Systems   Constitutional: Negative for activity change, chills and fatigue.   HENT: Positive for congestion, sinus pressure and sore throat. Negative for trouble swallowing.    Eyes: Negative.    Respiratory: Negative.  Negative for shortness of breath.    Cardiovascular: Negative.    Gastrointestinal: Negative.  Negative for vomiting.   Musculoskeletal: Negative.    Neurological: Positive for headaches.   Psychiatric/Behavioral:        Following psych with some improvement      All other  "systems reviewed and are negative.      Procedures    Vitals: Blood pressure 106/71, pulse 70, height 177.8 cm (70\"), weight (!) 142 kg (313 lb), SpO2 98 %.     Allergies: No Known Allergies       Objective   Physical Exam   Constitutional: He is oriented to person, place, and time. He appears well-developed and well-nourished. No distress.   HENT:   Head: Normocephalic.   Right Ear: Hearing, tympanic membrane, external ear and ear canal normal.   Left Ear: Hearing, tympanic membrane, external ear and ear canal normal.   Nose: Mucosal edema and rhinorrhea present. Right sinus exhibits no maxillary sinus tenderness and no frontal sinus tenderness. Left sinus exhibits no maxillary sinus tenderness and no frontal sinus tenderness.   Mouth/Throat: Oropharyngeal exudate present.   left lower lip with pustular cluster erythremia       Cardiovascular: Normal rate, regular rhythm and normal heart sounds.    No murmur heard.  Pulmonary/Chest: Effort normal and breath sounds normal.   Neurological: He is alert and oriented to person, place, and time.   Skin: Skin is warm and dry. He is not diaphoretic.   Psychiatric: He has a normal mood and affect. His behavior is normal.   Nursing note and vitals reviewed.      Assessment/Plan   Discussed with patient impression and plan, patient verbalizes understanding  Reviewed flu test with patient  Agreed to start new medication for sinus    Brady was seen today for sore throat and sinus problem.    Diagnoses and all orders for this visit:    URI with cough and congestion  -     POC Influenza A / B    Cold sore    Other orders  -     loratadine (CLARITIN) 10 MG tablet; Take 1 tablet by mouth Daily.  -     fluticasone (FLONASE) 50 MCG/ACT nasal spray; 2 sprays into each nostril Daily.  -     valACYclovir (VALTREX) 1000 MG tablet; Take 1 tablet by mouth 2 (Two) Times a Day.  -     azithromycin (ZITHROMAX) 250 MG tablet; Take 2 tablets the first day, then 1 tablet daily for 4 " days.

## 2017-12-12 NOTE — TELEPHONE ENCOUNTER
He was receiving the vailum from [facility], I have him on propranolol.  He will need to talk to her about getting a refill. We can send in a refill for the lamictal.

## 2017-12-12 NOTE — TELEPHONE ENCOUNTER
Patient called and realized he had missed his last appt. I scheduled him for Monday. He is out of the Lamictal and said that he is not doing well. Feeling down and couldn't go to work today. He said that you took him off Valium and gave him something else and it is not helping him at all. He is asking that you restart him on Valium.

## 2017-12-13 LAB
ENDOMYSIUM IGA SER QL: NEGATIVE
GLIADIN PEPTIDE IGA SER-ACNC: 13 UNITS (ref 0–19)
GLIADIN PEPTIDE IGG SER-ACNC: 14 UNITS (ref 0–19)
IGA SERPL-MCNC: 267 MG/DL (ref 90–386)
TTG IGA SER-ACNC: <2 U/ML (ref 0–3)
TTG IGG SER-ACNC: <2 U/ML (ref 0–5)

## 2017-12-14 ENCOUNTER — TELEPHONE (OUTPATIENT)
Dept: GASTROENTEROLOGY | Facility: CLINIC | Age: 37
End: 2017-12-14

## 2017-12-14 NOTE — TELEPHONE ENCOUNTER
Let patient know that his labs were normal and to continue with planned procedure. He voiced understanding.

## 2017-12-14 NOTE — TELEPHONE ENCOUNTER
Please let patient know lab results were all within normal range. Continue with planned procedure.

## 2017-12-18 ENCOUNTER — OFFICE VISIT (OUTPATIENT)
Dept: PSYCHIATRY | Facility: CLINIC | Age: 37
End: 2017-12-18

## 2017-12-18 VITALS
SYSTOLIC BLOOD PRESSURE: 127 MMHG | HEART RATE: 85 BPM | BODY MASS INDEX: 45.1 KG/M2 | HEIGHT: 70 IN | WEIGHT: 315 LBS | DIASTOLIC BLOOD PRESSURE: 87 MMHG

## 2017-12-18 DIAGNOSIS — F39 UNSPECIFIED MOOD (AFFECTIVE) DISORDER (HCC): Primary | ICD-10-CM

## 2017-12-18 PROCEDURE — 99213 OFFICE O/P EST LOW 20 MIN: CPT | Performed by: NURSE PRACTITIONER

## 2017-12-18 RX ORDER — ESCITALOPRAM OXALATE 10 MG/1
15 TABLET ORAL DAILY
Qty: 45 TABLET | Refills: 0 | Status: SHIPPED | OUTPATIENT
Start: 2017-12-18 | End: 2018-02-20 | Stop reason: SDUPTHER

## 2017-12-18 RX ORDER — LAMOTRIGINE 100 MG/1
100 TABLET ORAL DAILY
Qty: 30 TABLET | Refills: 0 | Status: SHIPPED | OUTPATIENT
Start: 2017-12-18 | End: 2018-02-20 | Stop reason: SDUPTHER

## 2017-12-18 RX ORDER — DIAZEPAM 5 MG/1
5 TABLET ORAL EVERY 8 HOURS PRN
Qty: 15 TABLET | Refills: 0 | Status: SHIPPED | OUTPATIENT
Start: 2017-12-18 | End: 2018-03-08 | Stop reason: SDUPTHER

## 2017-12-18 NOTE — PROGRESS NOTES
"  Subjective   Brady Mcknight is a 37 y.o. male is here today for medication management follow-up at [facility], he presents to his appointment on time.     Chief Complaint: Mood     History of Present Illness   He states that he is doing ok but shares that he is not able to tell much of a difference with the propranolol for anxiety, valium worked better.  He feels like the lamictal has been helpful in improving his mood.  He states that he can tell that he is not so much aggravated.  He shares that he had a complete meltdown last night for about 2 hours- he describes being overwhelmed, super depressed, he was not able to talk to anyone, unable to sleep.  He shares that he hasn't been able to work because of his \"nerves\".  He feels like everyday that he lives feels like torture, rates his depression 9/10 with 10 being the worse- shares that he has a decrease in his anxiety with the increase in the lamictal.  He states that he has a difficult time with the rating scales.  He has noted to have a decrease in his panic attacks to about 2-3 episodes per week.  He shares that he is normally sleeping about 8 hours per night with no NM but shares \"funny dreams\".  He states that he has been eating more fast food recently- noted to have gained about 7 pounds in the last week.  He shares that he was seen by his PCP recently because of upper respiratory infection- was started on antibiotic.  He denies any AV hallucinations, denies any SI/HI.        The following portions of the patient's history were reviewed and updated as appropriate: allergies, current medications, past family history, past medical history, past social history, past surgical history and problem list.    Review of Systems   Constitutional: Negative for appetite change, chills, diaphoresis, fatigue, fever and unexpected weight change.   HENT: Negative for hearing loss, sore throat, trouble swallowing and voice change.    Eyes: Negative for photophobia and " "visual disturbance.   Respiratory: Negative for cough, chest tightness and shortness of breath.    Cardiovascular: Negative for chest pain and palpitations.   Gastrointestinal: Negative for abdominal pain, constipation, nausea and vomiting.   Endocrine: Negative for cold intolerance and heat intolerance.   Genitourinary: Negative for dysuria and frequency.   Musculoskeletal: Negative for arthralgias, back pain, joint swelling and neck stiffness.   Skin: Negative for color change and wound.   Allergic/Immunologic: Negative for environmental allergies and immunocompromised state.   Neurological: Negative for dizziness, tremors, seizures, syncope, weakness, light-headedness and headaches.   Hematological: Negative for adenopathy. Does not bruise/bleed easily.       Objective   Physical Exam   Constitutional: He appears well-developed and well-nourished. No distress.   Neurological: He is alert. Coordination and gait normal.   Vitals reviewed.    Blood pressure 127/87, pulse 85, height 177.8 cm (70\"), weight (!) 145 kg (320 lb).    Medication List:   Current Outpatient Prescriptions   Medication Sig Dispense Refill   • azithromycin (ZITHROMAX) 250 MG tablet Take 2 tablets the first day, then 1 tablet daily for 4 days. 6 tablet 0   • diazePAM (VALIUM) 5 MG tablet Take 1 tablet by mouth Every 8 (Eight) Hours As Needed for Anxiety. 15 tablet 0   • escitalopram (LEXAPRO) 10 MG tablet Take 1.5 tablets by mouth Daily. 45 tablet 0   • fluticasone (FLONASE) 50 MCG/ACT nasal spray 2 sprays into each nostril Daily. 1 bottle 5   • lamoTRIgine (LaMICtal) 100 MG tablet Take 1 tablet by mouth Daily. 30 tablet 0   • levothyroxine (SYNTHROID, LEVOTHROID) 150 MCG tablet Take 1 tablet by mouth Daily. 30 tablet 5   • lisinopril (PRINIVIL,ZESTRIL) 5 MG tablet Take 2 tablets by mouth Daily. 30 tablet 5   • loratadine (CLARITIN) 10 MG tablet Take 1 tablet by mouth Daily. 30 tablet 5   • polyethylene glycol (GoLYTELY) 236 g solution Starting " at 6pm the day before procedure, drink 8 ounces every 30 minutes until all gone or stools are clear. May add flavor packet. 4000 mL 0   • psyllium (KONSYL) 100 % pack packet Take 1 packet by mouth Daily. If no packets, use 1 tsp daily 1 bottle 3   • rifaximin (XIFAXAN) 550 MG tablet Take 1 tablet by mouth 3 (Three) Times a Day. 42 tablet 0   • Testosterone 75 MG pellet Place 75 mg on the skin Daily. 30 each 0   • valACYclovir (VALTREX) 1000 MG tablet Take 1 tablet by mouth 2 (Two) Times a Day. 8 tablet 1     No current facility-administered medications for this visit.        Mental Status Exam:   Hygiene:   good  Cooperation:  Cooperative  Eye Contact:  Fair  Psychomotor Behavior:  Appropriate  Affect:  Appropriate  Hopelessness: 3  Speech:  Normal  Thought Process:  Linear  Thought Content:  Mood congurent  Suicidal:  None  Homicidal:  None  Hallucinations:  None  Delusion:  None  Memory:  Intact  Orientation:  Person, Place, Time and Situation  Reliability:  fair  Insight:  Fair  Judgement:  Fair  Impulse Control:  Fair  Physical/Medical Issues:  No     Assessment/Plan   Problems Addressed this Visit     None      Visit Diagnoses     Unspecified mood (affective) disorder    -  Primary    Relevant Medications    escitalopram (LEXAPRO) 10 MG tablet    diazePAM (VALIUM) 5 MG tablet      Lamictal 100mg for mood       Discussed medication options.  Reviewed the risks, benefits, and side effects of the medications; patient acknowledged and verbally consented.  Patient is agreeable to call the [facility].  Patient is aware to call 911 or go to the nearest ER should begin having SI/HI.  Recommended therapy to address coping skills.    Prognosis: Guarded dependent on medication, follow up appointment and treatment plan compliance   Functionality:  Fair. Depression and anxiety is impacting the ability to maintain employment.      Return in 4 weeks

## 2017-12-27 ENCOUNTER — TELEPHONE (OUTPATIENT)
Dept: UROLOGY | Facility: CLINIC | Age: 37
End: 2017-12-27

## 2017-12-29 ENCOUNTER — PROCEDURE VISIT (OUTPATIENT)
Dept: UROLOGY | Facility: CLINIC | Age: 37
End: 2017-12-29

## 2017-12-29 DIAGNOSIS — Z30.09 ENCOUNTER FOR VASECTOMY COUNSELING: Primary | ICD-10-CM

## 2017-12-29 PROCEDURE — 99213 OFFICE O/P EST LOW 20 MIN: CPT | Performed by: UROLOGY

## 2017-12-29 NOTE — PROGRESS NOTES
Chief Complaint:       Chief Complaint   Patient presents with   • Sterilization           HPI:       HPI  Pt is 330 lbs and he wants a vasectomy.  I advised pt that I have just come down with a viral illness last night and that this could make the procedure more difficult.  Pt wanted to give it a try.      PMI:      Past Medical History:   Diagnosis Date   • Anger    • Anxiety    • Arthritis    • CHF (congestive heart failure)    • Chronic back pain    • Depression    • Fatigue    • History of heart murmur in childhood    • Hyperlipidemia    • Hypertension    • Hypothyroidism    • Obesity    • Positive urine drug screen 03/2016   • Screening PSA (prostate specific antigen) 2014           Medications:        Current Outpatient Prescriptions:   •  azithromycin (ZITHROMAX) 250 MG tablet, Take 2 tablets the first day, then 1 tablet daily for 4 days., Disp: 6 tablet, Rfl: 0  •  diazePAM (VALIUM) 5 MG tablet, Take 1 tablet by mouth Every 8 (Eight) Hours As Needed for Anxiety., Disp: 15 tablet, Rfl: 0  •  escitalopram (LEXAPRO) 10 MG tablet, Take 1.5 tablets by mouth Daily., Disp: 45 tablet, Rfl: 0  •  fluticasone (FLONASE) 50 MCG/ACT nasal spray, 2 sprays into each nostril Daily., Disp: 1 bottle, Rfl: 5  •  lamoTRIgine (LaMICtal) 100 MG tablet, Take 1 tablet by mouth Daily., Disp: 30 tablet, Rfl: 0  •  levothyroxine (SYNTHROID, LEVOTHROID) 150 MCG tablet, Take 1 tablet by mouth Daily., Disp: 30 tablet, Rfl: 5  •  lisinopril (PRINIVIL,ZESTRIL) 5 MG tablet, Take 2 tablets by mouth Daily., Disp: 30 tablet, Rfl: 5  •  loratadine (CLARITIN) 10 MG tablet, Take 1 tablet by mouth Daily., Disp: 30 tablet, Rfl: 5  •  polyethylene glycol (GoLYTELY) 236 g solution, Starting at 6pm the day before procedure, drink 8 ounces every 30 minutes until all gone or stools are clear. May add flavor packet., Disp: 4000 mL, Rfl: 0  •  psyllium (KONSYL) 100 % pack packet, Take 1 packet by mouth Daily. If no packets, use 1 tsp daily, Disp: 1  bottle, Rfl: 3  •  rifaximin (XIFAXAN) 550 MG tablet, Take 1 tablet by mouth 3 (Three) Times a Day., Disp: 42 tablet, Rfl: 0  •  Testosterone 75 MG pellet, Place 75 mg on the skin Daily., Disp: 30 each, Rfl: 0  •  valACYclovir (VALTREX) 1000 MG tablet, Take 1 tablet by mouth 2 (Two) Times a Day., Disp: 8 tablet, Rfl: 1        Allergies:      No Known Allergies        Past Surgical Histroy:      Past Surgical History:   Procedure Laterality Date   • ABDOMINAL SURGERY     • CHOLECYSTECTOMY     • CHOLECYSTECTOMY WITH INTRAOPERATIVE CHOLANGIOGRAM N/A 9/5/2017    Procedure: CHOLECYSTECTOMY LAPAROSCOPIC INTRAOPERATIVE CHOLANGIOGRAM;  Surgeon: Joni Dawn MD;  Location: [facility];  Service:    • GASTRIC BANDING  08/21/2016   • WISDOM TOOTH EXTRACTION             Social History:      Social History     Social History   • Marital status:      Spouse name: N/A   • Number of children: N/A   • Years of education: N/A     Occupational History   •       Social History Main Topics   • Smoking status: Never Smoker   • Smokeless tobacco: Current User     Types: Snuff   • Alcohol use No   • Drug use: No   • Sexual activity: Defer     Other Topics Concern   • Not on file     Social History Narrative           Family History:      Family History   Problem Relation Age of Onset   • Cancer Mother      breast   • Lymphoma Father    • Cancer Sister      lung            Review of Symptoms:      Genito-Urinary ROS: negative        Physical Exam:      Physical Exam   Constitutional: He is oriented to person, place, and time.   Grossly morbidly obese   HENT:   Head: Normocephalic and atraumatic.   Right Ear: External ear normal.   Left Ear: External ear normal.   Nose: Nose normal.   Mouth/Throat: Oropharynx is clear and moist.   Eyes: Conjunctivae and EOM are normal. Pupils are equal, round, and reactive to light.   Neck: Normal range of motion. Neck supple. No thyromegaly present.   Cardiovascular: Normal rate,  regular rhythm, normal heart sounds and intact distal pulses.    No murmur heard.  Pulmonary/Chest: Effort normal and breath sounds normal. No respiratory distress. He has no wheezes. He has no rales. He exhibits no tenderness.   Abdominal: Soft. Bowel sounds are normal. He exhibits no distension and no mass. There is no tenderness. No hernia.   Genitourinary: Penis normal.   Genitourinary Comments: I spent about 15 minutes trying to feel his vas on each side and I was able to fine the right side but I could not find the left vas.   Musculoskeletal: Normal range of motion. He exhibits no edema or tenderness.   Lymphadenopathy:     He has no cervical adenopathy.   Neurological: He is alert and oriented to person, place, and time. No cranial nerve deficit. He exhibits normal muscle tone. Coordination normal.   Skin: Skin is warm. No rash noted.   Psychiatric: He has a normal mood and affect. His behavior is normal. Judgment and thought content normal.   Nursing note and vitals reviewed.          Procedure:    Pt's procedure was not performed due to the difficulty to locate the vas on the left side  Discussion:      I would recommend that the pt have the vasectomy in the operating room under anesthesia.  I would be certain to identify the vas on each side there.  I would be willing to try it again in the office with different antianxiety medication ie xanax But I really would recommend doing the procedure in the operating room.    Counseling was given to patient and family for the following topics risks and benefits of treatment options. and the interim medical history and current results were reviewed.  A treatment plan with follow-up was made. Total time of the encounter was 18 minutes and 18 minutes were spent discussing Encounter for vasectomy counseling [Z30.09] face-to-face.      This document has been electronically signed by Ok Ramírez MD December 29, 2017 1:32 PM    This document has been electronically  signed by Ok Ramírez MD December 29, 2017 1:32 PM

## 2018-01-03 NOTE — PROGRESS NOTES
Patient called stating he needs a refill on his testosterone cypionate for low testosterone.  A refill prescription for testosterone cypionate 200 mg/ml - to inject 2 mL IM every 2 weeks # 10 mL with 2 refills was called to [facility] at [phone] per order of Dr. Quinn along with needles and syringes.

## 2018-01-04 ENCOUNTER — ANESTHESIA (OUTPATIENT)
Dept: PERIOP | Facility: HOSPITAL | Age: 38
End: 2018-01-04

## 2018-01-04 ENCOUNTER — HOSPITAL ENCOUNTER (OUTPATIENT)
Facility: HOSPITAL | Age: 38
Setting detail: HOSPITAL OUTPATIENT SURGERY
Discharge: HOME | End: 2018-01-04
Attending: INTERNAL MEDICINE | Admitting: INTERNAL MEDICINE

## 2018-01-04 ENCOUNTER — ANESTHESIA EVENT (OUTPATIENT)
Dept: PERIOP | Facility: HOSPITAL | Age: 38
End: 2018-01-04

## 2018-01-04 VITALS
WEIGHT: 300 LBS | TEMPERATURE: 98.7 F | HEART RATE: 67 BPM | HEIGHT: 70 IN | BODY MASS INDEX: 42.95 KG/M2 | OXYGEN SATURATION: 98 % | RESPIRATION RATE: 20 BRPM | SYSTOLIC BLOOD PRESSURE: 140 MMHG | DIASTOLIC BLOOD PRESSURE: 82 MMHG

## 2018-01-04 DIAGNOSIS — R19.4 CHANGE IN BOWEL HABITS: ICD-10-CM

## 2018-01-04 DIAGNOSIS — R10.9 ABDOMINAL CRAMPING: ICD-10-CM

## 2018-01-04 DIAGNOSIS — K58.0 IRRITABLE BOWEL SYNDROME WITH DIARRHEA: ICD-10-CM

## 2018-01-04 LAB
027 TOXIN: NORMAL
C DIFF TOX GENS STL QL NAA+PROBE: NEGATIVE

## 2018-01-04 PROCEDURE — 87493 C DIFF AMPLIFIED PROBE: CPT | Performed by: INTERNAL MEDICINE

## 2018-01-04 PROCEDURE — 87899 AGENT NOS ASSAY W/OPTIC: CPT | Performed by: INTERNAL MEDICINE

## 2018-01-04 PROCEDURE — 25010000002 PROPOFOL 10 MG/ML EMULSION: Performed by: NURSE ANESTHETIST, CERTIFIED REGISTERED

## 2018-01-04 PROCEDURE — 87177 OVA AND PARASITES SMEARS: CPT | Performed by: INTERNAL MEDICINE

## 2018-01-04 PROCEDURE — 25010000002 MIDAZOLAM PER 1 MG: Performed by: ANESTHESIOLOGY

## 2018-01-04 PROCEDURE — 87046 STOOL CULTR AEROBIC BACT EA: CPT | Performed by: INTERNAL MEDICINE

## 2018-01-04 PROCEDURE — 25010000002 PROPOFOL 1000 MG/ML EMULSION: Performed by: NURSE ANESTHETIST, CERTIFIED REGISTERED

## 2018-01-04 PROCEDURE — 87045 FECES CULTURE AEROBIC BACT: CPT | Performed by: INTERNAL MEDICINE

## 2018-01-04 PROCEDURE — 45380 COLONOSCOPY AND BIOPSY: CPT | Performed by: INTERNAL MEDICINE

## 2018-01-04 PROCEDURE — 25010000002 FENTANYL CITRATE (PF) 100 MCG/2ML SOLUTION: Performed by: NURSE ANESTHETIST, CERTIFIED REGISTERED

## 2018-01-04 PROCEDURE — 87209 SMEAR COMPLEX STAIN: CPT | Performed by: INTERNAL MEDICINE

## 2018-01-04 RX ORDER — IPRATROPIUM BROMIDE AND ALBUTEROL SULFATE 2.5; .5 MG/3ML; MG/3ML
3 SOLUTION RESPIRATORY (INHALATION) ONCE AS NEEDED
Status: DISCONTINUED | OUTPATIENT
Start: 2018-01-04 | End: 2018-01-04 | Stop reason: HOSPADM

## 2018-01-04 RX ORDER — FENTANYL CITRATE 50 UG/ML
INJECTION, SOLUTION INTRAMUSCULAR; INTRAVENOUS AS NEEDED
Status: DISCONTINUED | OUTPATIENT
Start: 2018-01-04 | End: 2018-01-04 | Stop reason: SURG

## 2018-01-04 RX ORDER — LIDOCAINE HYDROCHLORIDE 20 MG/ML
INJECTION, SOLUTION INFILTRATION; PERINEURAL AS NEEDED
Status: DISCONTINUED | OUTPATIENT
Start: 2018-01-04 | End: 2018-01-04 | Stop reason: SURG

## 2018-01-04 RX ORDER — MIDAZOLAM HYDROCHLORIDE 1 MG/ML
INJECTION INTRAMUSCULAR; INTRAVENOUS AS NEEDED
Status: DISCONTINUED | OUTPATIENT
Start: 2018-01-04 | End: 2018-01-04 | Stop reason: SURG

## 2018-01-04 RX ORDER — MEPERIDINE HYDROCHLORIDE 25 MG/ML
12.5 INJECTION INTRAMUSCULAR; INTRAVENOUS; SUBCUTANEOUS
Status: DISCONTINUED | OUTPATIENT
Start: 2018-01-04 | End: 2018-01-04 | Stop reason: HOSPADM

## 2018-01-04 RX ORDER — OXYCODONE HYDROCHLORIDE AND ACETAMINOPHEN 5; 325 MG/1; MG/1
1 TABLET ORAL ONCE AS NEEDED
Status: DISCONTINUED | OUTPATIENT
Start: 2018-01-04 | End: 2018-01-04 | Stop reason: HOSPADM

## 2018-01-04 RX ORDER — SODIUM CHLORIDE, SODIUM LACTATE, POTASSIUM CHLORIDE, CALCIUM CHLORIDE 600; 310; 30; 20 MG/100ML; MG/100ML; MG/100ML; MG/100ML
125 INJECTION, SOLUTION INTRAVENOUS CONTINUOUS
Status: DISCONTINUED | OUTPATIENT
Start: 2018-01-04 | End: 2018-01-04 | Stop reason: HOSPADM

## 2018-01-04 RX ORDER — SODIUM CHLORIDE 0.9 % (FLUSH) 0.9 %
1-10 SYRINGE (ML) INJECTION AS NEEDED
Status: DISCONTINUED | OUTPATIENT
Start: 2018-01-04 | End: 2018-01-04 | Stop reason: HOSPADM

## 2018-01-04 RX ORDER — FENTANYL CITRATE 50 UG/ML
50 INJECTION, SOLUTION INTRAMUSCULAR; INTRAVENOUS
Status: DISCONTINUED | OUTPATIENT
Start: 2018-01-04 | End: 2018-01-04 | Stop reason: HOSPADM

## 2018-01-04 RX ORDER — ONDANSETRON 2 MG/ML
4 INJECTION INTRAMUSCULAR; INTRAVENOUS ONCE AS NEEDED
Status: DISCONTINUED | OUTPATIENT
Start: 2018-01-04 | End: 2018-01-04 | Stop reason: HOSPADM

## 2018-01-04 RX ORDER — PROPOFOL 10 MG/ML
VIAL (ML) INTRAVENOUS AS NEEDED
Status: DISCONTINUED | OUTPATIENT
Start: 2018-01-04 | End: 2018-01-04 | Stop reason: SURG

## 2018-01-04 RX ADMIN — LIDOCAINE HYDROCHLORIDE 40 MG: 20 INJECTION, SOLUTION INFILTRATION; PERINEURAL at 09:25

## 2018-01-04 RX ADMIN — SODIUM CHLORIDE, POTASSIUM CHLORIDE, SODIUM LACTATE AND CALCIUM CHLORIDE: 600; 310; 30; 20 INJECTION, SOLUTION INTRAVENOUS at 09:23

## 2018-01-04 RX ADMIN — PROPOFOL 250 MCG/KG/MIN: 10 INJECTION, EMULSION INTRAVENOUS at 09:25

## 2018-01-04 RX ADMIN — PROPOFOL 30 MG: 10 INJECTION, EMULSION INTRAVENOUS at 09:25

## 2018-01-04 RX ADMIN — FENTANYL CITRATE 100 MCG: 50 INJECTION INTRAMUSCULAR; INTRAVENOUS at 09:24

## 2018-01-04 RX ADMIN — MIDAZOLAM HYDROCHLORIDE 2 MG: 1 INJECTION, SOLUTION INTRAMUSCULAR; INTRAVENOUS at 09:01

## 2018-01-04 NOTE — OP NOTE
01/04/18    COLONOSCOPY with biopsies and stool aspirate  Procedure Note    Brady Mcknight  1/4/2018    Pre-op Diagnosis: Chronic diarrhea, colon cancer screening, no prior colonoscopy      Post-op Diagnosis: Normal colonoscopy, including visualization of the terminal ileum        Anesthesia: Per Anesthesia service, general anesthesia      Estimated Blood Loss: Negligible      Findings: Normal rectal examination.  Colonoscopy was performed to the terminal ileum.  Bowel preparation was satisfactory.  All areas were examined carefully.  The scope was retroflexed within the rectum.  No abnormality was seen in the colon or in the visualized portion of the terminal ileum.  Sees were taken randomly from the colon and TI.  Stool sample was collected for culture, ova and parasites, Clostridium difficile toxin.  He tolerated the procedure well and there were no complications.  He left the OR in stable and satisfactory condition.      Complications: None      Recommendations:   -Findings discussed with the patient  -Await findings of biopsies and stool studies  -Repeat screening/surveillance colonoscopy at age 48      Remington Burroughs III, MD     Date: 1/4/2018  Time: 9:50 AM     CC:  Angelina CANTU

## 2018-01-04 NOTE — ANESTHESIA POSTPROCEDURE EVALUATION
Patient: Brady Mcknight    Procedure Summary     Date Anesthesia Start Anesthesia Stop Room / Location    01/04/18 0923 0949 [facility] OR 10 / [facility]       Procedure Diagnosis Surgeon Provider    COLONOSCOPY CPT CODE: 82130 (N/A ) Change in bowel habits; Abdominal cramping; Irritable bowel syndrome with diarrhea  (Change in bowel habits [R19.4]; Abdominal cramping [R10.9]; Irritable bowel syndrome with diarrhea [K58.0]) MD Dwaine Martinez III, MD          Anesthesia Type: general  Last vitals  BP   155/74 (01/04/18 0853)   Temp   98.9 °F (37.2 °C) (01/04/18 0853)   Pulse   85 (01/04/18 0853)   Resp   20 (01/04/18 0853)     SpO2   98 % (01/04/18 0853)     Post Anesthesia Care and Evaluation    Patient location during evaluation: PHASE II  Patient participation: complete - patient participated  Level of consciousness: awake and alert  Pain score: 1  Pain management: adequate  Airway patency: patent  Anesthetic complications: No anesthetic complications  PONV Status: controlled  Cardiovascular status: acceptable  Respiratory status: acceptable  Hydration status: acceptable

## 2018-01-04 NOTE — H&P (VIEW-ONLY)
Chief Complaint   Patient presents with   • Diarrhea       Brady Mcknight is a 37 y.o. male who presents to the office today for follow up appointment regarding Diarrhea.    HPI  He is taking Imodium 8-10 times per day due to severe diarrhea. Since his last appointment, symptoms have remained the same. He changed to name brand imodium and this did not seem to help. He has already took several today. His stools are completely watery and will decrease in frequency when he takes the medication. Sometimes he will skip 1 day without a bowel movement if he takes too much imodium but then immediately returns to his normal after 1 formed stool. Diarrhea has been present for the past 2 years. He will even awake at night sometimes with diarrhea. He will have up to 7 stools per day. With imodium, he has 2 watery stools. He will have so much diarrhea at times that it causes rectal bleeding. Stools are described as very watery and not even brown at times. Fecal incontinence is present and he has to come home from work to change clothes. He has had recent antibiotics including azithromycin and rocephin. Abdominal pain is generalized and urgency is present. Abdominal pain will be sudden and sporadic and he knows that he must run to the bathroom. Borborygmi noted. Denies heartburn, acid reflux, nausea. He is s/p lap band and has lost 100 lbs in the past 1 year. Appetite is good. Never had a colonoscopy. He took Citrucel for a while (he bought over the counter as recommended by PCP) but that did not seem to help at all.     Stool studies 11/28/2017 negative to include C diff, H pylori, strongyloides, stool culture and O&P. Abdominal film 11/13/2017 was normal and port noted from gastric band placement.     Review of Systems   Constitutional: Negative for chills, fatigue and fever.   HENT: Positive for congestion, sore throat, trouble swallowing and voice change.    Eyes: Negative for pain and visual disturbance.   Respiratory: Negative  for cough, shortness of breath and wheezing.    Cardiovascular: Negative for chest pain, palpitations and leg swelling.   Gastrointestinal: Positive for diarrhea. Negative for abdominal distention, abdominal pain, anal bleeding, blood in stool, constipation, nausea, rectal pain and vomiting.   Endocrine: Negative for cold intolerance and heat intolerance.   Genitourinary: Negative for difficulty urinating and frequency.   Musculoskeletal: Negative for arthralgias, back pain and myalgias.   Skin: Negative for rash and wound.   Allergic/Immunologic: Positive for environmental allergies. Negative for food allergies.   Neurological: Positive for dizziness and headaches.   Hematological: Does not bruise/bleed easily.   Psychiatric/Behavioral: Positive for sleep disturbance. The patient is nervous/anxious.        Past Medical History:   Diagnosis Date   • Anger    • Anxiety    • Arthritis    • CHF (congestive heart failure)    • Chronic back pain    • Depression    • Fatigue    • History of heart murmur in childhood    • Hyperlipidemia    • Hypertension    • Hypothyroidism    • Obesity    • Positive urine drug screen 03/2016   • Screening PSA (prostate specific antigen) 2014       Past Surgical History:   Procedure Laterality Date   • ABDOMINAL SURGERY     • CHOLECYSTECTOMY     • CHOLECYSTECTOMY WITH INTRAOPERATIVE CHOLANGIOGRAM N/A 9/5/2017    Procedure: CHOLECYSTECTOMY LAPAROSCOPIC INTRAOPERATIVE CHOLANGIOGRAM;  Surgeon: Joni Dawn MD;  Location: [facility];  Service:    • GASTRIC BANDING  08/21/2016   • WISDOM TOOTH EXTRACTION         Family History   Problem Relation Age of Onset   • Cancer Mother      breast   • Lymphoma Father    • Cancer Sister      lung        Social History   Substance Use Topics   • Smoking status: Never Smoker   • Smokeless tobacco: Current User     Types: Snuff   • Alcohol use No       CURRENT MEDICATION:  •  diazePAM (VALIUM) 5 MG tablet, Take 1 tablet by mouth 2 (Two) Times a Day As  "Needed for Anxiety., Disp: 30 tablet, Rfl: 0  •  escitalopram (LEXAPRO) 20 MG tablet, Take 1 tablet by mouth Daily., Disp: 30 tablet, Rfl: 5  •  lamoTRIgine (LaMICtal) 100 MG tablet, Take 1 tablet by mouth Daily., Disp: 30 tablet, Rfl: 0  •  levothyroxine (SYNTHROID, LEVOTHROID) 150 MCG tablet, Take 1 tablet by mouth Daily., Disp: 30 tablet, Rfl: 5  •  lisinopril (PRINIVIL,ZESTRIL) 5 MG tablet, Take 2 tablets by mouth Daily., Disp: 30 tablet, Rfl: 5  •  propranolol (INDERAL) 10 MG tablet, Take 1 tablet by mouth 2 (Two) Times a Day As Needed (anxiety)., Disp: 60 tablet, Rfl: 0  •  Testosterone 75 MG pellet, Place 75 mg on the skin Daily., Disp: 30 each, Rfl: 0    ALLERGIES:  Review of patient's allergies indicates no known allergies.    VISIT VITALS:  /84  Pulse 73  Ht 177.8 cm (70\")  Wt (!) 142 kg (314 lb)  SpO2 96%  BMI 45.05 kg/m2    Physical Exam   Constitutional: He is oriented to person, place, and time. He appears well-developed and well-nourished. No distress.   HENT:   Head: Normocephalic and atraumatic.   Right Ear: External ear normal.   Left Ear: External ear normal.   Nose: Nose normal.   Mouth/Throat: Oropharynx is clear and moist.   Eyes: Conjunctivae and EOM are normal. Right eye exhibits no discharge. Left eye exhibits no discharge. No scleral icterus.   Neck: Normal range of motion. Neck supple.   Cardiovascular: Normal rate, regular rhythm and normal heart sounds.  Exam reveals no gallop and no friction rub.    No murmur heard.  Pulmonary/Chest: Effort normal and breath sounds normal. No respiratory distress. He has no wheezes. He has no rales. He exhibits no tenderness.   Abdominal: Soft. Normal appearance and bowel sounds are normal. He exhibits no distension, no ascites and no mass. There is no tenderness. There is no rigidity and no guarding. No hernia.   Increased abdominal bowel sounds   Musculoskeletal: Normal range of motion. He exhibits no edema or deformity.   Neurological: He " is alert and oriented to person, place, and time. He exhibits normal muscle tone. Coordination normal.   Skin: Skin is warm and dry. No rash noted. No erythema. No pallor.   Psychiatric: He has a normal mood and affect. His behavior is normal. Judgment and thought content normal.   Nursing note and vitals reviewed.      Assessment/Plan     1. Irritable bowel syndrome with diarrhea    2. Abdominal cramping    3. Change in bowel habits    4. Diarrhea, unspecified type    5. Full incontinence of feces      I have recommended that he take Xifaxan 550 mg TID for 14 days as treatment for diarrhea. I do not think that it is likely that he only has IBS and so I have recommended further workup as well.     He will need a colonoscopy performed with IV general sedation. All of the risks, benefits and alternatives of this procedure have been discussed with him, all of his questions have been answered and he has elected to proceed. He should follow up in the office after this procedure to discuss the results and further recommendations can be made at that time.    Orders Placed This Encounter   Procedures   • Comprehensive Metabolic Panel   • Lipase   • Amylase   • C-reactive Protein   • Celiac Comprehensive Panel   • CBC & Differential     He was given sample of Konsyl and will take once daily as an effort to decrease watery diarrhea and fecal incontinence.           Return for follow up after procedure.       Nguyen Hodges PA-C       Electronically signed 12/11/2017 at 5:51 PM.

## 2018-01-04 NOTE — PLAN OF CARE
Problem: GI Endoscopy (Adult)  Goal: Signs and Symptoms of Listed Potential Problems Will be Absent or Manageable (GI Endoscopy)  Outcome: Ongoing (interventions implemented as appropriate)   01/04/18 4361   GI Endoscopy   Problems Assessed (GI Endoscopy) all   Problems Present (GI Endoscopy) none

## 2018-01-04 NOTE — ANESTHESIA PREPROCEDURE EVALUATION
Anesthesia Evaluation     Patient summary reviewed and Nursing notes reviewed   no history of anesthetic complications:  NPO Solid Status: > 8 hours  NPO Liquid Status: > 8 hours     Airway   Mallampati: II  TM distance: >3 FB  Neck ROM: full  no difficulty expected  Dental - normal exam     Pulmonary - normal exam   (+) a smoker Former, sleep apnea,   (-) asthma  Cardiovascular - normal exam  Exercise tolerance: good (4-7 METS)    NYHA Classification: II    (+) hypertension, valvular problems/murmurs, CHF, hyperlipidemia  (-) past MI, dysrhythmias, angina      Neuro/Psych  (+) psychiatric history Anxiety and Depression,     (-) seizures, CVA  GI/Hepatic/Renal/Endo    (+) morbid obesity, renal disease stones, hypothyroidism,   (-)  obesity, GERD, liver disease, diabetes    Musculoskeletal     (+) back pain,   Abdominal  - normal exam    Bowel sounds: normal.   Substance History - negative use     OB/GYN negative ob/gyn ROS         Other   (+) arthritis                                             Anesthesia Plan    ASA 3     general     intravenous induction   Anesthetic plan and risks discussed with patient and spouse/significant other.  Use of blood products discussed with patient and spouse/significant other  Consented to blood products.

## 2018-01-05 ENCOUNTER — TELEPHONE (OUTPATIENT)
Dept: GASTROENTEROLOGY | Facility: CLINIC | Age: 38
End: 2018-01-05

## 2018-01-05 LAB
LAB AP CASE REPORT: NORMAL
Lab: NORMAL
PATH REPORT.FINAL DX SPEC: NORMAL

## 2018-01-05 RX ORDER — SYRINGE WITH NEEDLE, 1 ML 25GX5/8"
SYRINGE, EMPTY DISPOSABLE MISCELLANEOUS
Refills: 0 | COMMUNITY
Start: 2017-11-16

## 2018-01-05 RX ORDER — NEEDLES, DISPOSABLE 25GX5/8"
NEEDLE, DISPOSABLE MISCELLANEOUS
Refills: 0 | COMMUNITY
Start: 2017-12-06

## 2018-01-05 RX ORDER — TESTOSTERONE CYPIONATE 200 MG/ML
INJECTION, SOLUTION INTRAMUSCULAR
Refills: 0 | COMMUNITY
Start: 2017-12-06 | End: 2019-09-23

## 2018-01-05 RX ORDER — MONTELUKAST SODIUM 4 MG/1
1 TABLET, CHEWABLE ORAL 2 TIMES DAILY
Qty: 60 TABLET | Refills: 5 | Status: SHIPPED | OUTPATIENT
Start: 2018-01-05 | End: 2018-03-19 | Stop reason: SDUPTHER

## 2018-01-05 NOTE — TELEPHONE ENCOUNTER
I spoke with patient and gave him information. He voiced understanding. He has a follow up for 2/1/18.

## 2018-01-05 NOTE — TELEPHONE ENCOUNTER
----- Message from Remington Burroughs III, MD sent at 1/5/2018  3:29 PM EST -----  Please notify the patient that his colonoscopy and biopsies were normal.  I sent a prescription to his pharmacy for Colestipol for control of diarrhea.  Please schedule OV in about 3–4 weeks.  Thank you.  FRANCO

## 2018-01-05 NOTE — PROGRESS NOTES
Please notify the patient that his colonoscopy and biopsies were normal.  I sent a prescription to his pharmacy for Colestipol for control of diarrhea.  Please schedule OV in about 3–4 weeks.  Thank you.  FRANCO

## 2018-01-05 NOTE — TELEPHONE ENCOUNTER
Patient called and asked us to call in his testosterone. I looked in his chart and he seen Barbara on 1/3/18 and she wrote him his refill for the testosterone. I called [facility] and they said they didn't know what the mix up was, but his script was ready and there waiting for him.  I called the patient back and he said that he took the script to the pharmacy on the 3rd and went back to get it a day or two later and they said that we would have to call it it. The pharmacy told me that they had no record of that, that they received the prescription the same day Barbara wrote it and it must have been a miscommunication between them and the patient.

## 2018-01-06 LAB
BACTERIA SPEC AEROBE CULT: NORMAL
O+P SPEC MICRO: NORMAL
OVA + PARASITE RESULT 1: NORMAL

## 2018-01-24 ENCOUNTER — DOCUMENTATION (OUTPATIENT)
Dept: UROLOGY | Facility: CLINIC | Age: 38
End: 2018-01-24

## 2018-01-24 NOTE — PROGRESS NOTES
I called and spoke with patient regarding scheduling his procedure. The patient stated that he didn't want to have it done in the operating room, he wanted it done in the office.  He stated that he originally saw Dr Quinn and was confused to why he saw Dr Ramírez when he came to his office appointment for the vasectomy. Dr Ramírez told the patient that he had difficulty feeling the anatomy he needed to for the procedure and he would take him to surgery to do the procedure if he wanted or he could follow up with Dr Quinn to see if he would be able to perform the procedure in the office. I informed the patient that I would give the request to Dr Quinn and/or Silvina Rios so that he could be scheduled for an in office procedure, and someone would call him with a date and time.

## 2018-02-05 ENCOUNTER — TELEPHONE (OUTPATIENT)
Dept: UROLOGY | Facility: CLINIC | Age: 38
End: 2018-02-05

## 2018-02-05 NOTE — TELEPHONE ENCOUNTER
Left message @ [phone] to call the office so I can get him scheduled for his Vasectomy with Dr. Quinn. If he calls please transfer him to me.

## 2018-02-07 ENCOUNTER — OFFICE VISIT (OUTPATIENT)
Dept: UROLOGY | Facility: CLINIC | Age: 38
End: 2018-02-07

## 2018-02-07 VITALS — WEIGHT: 295 LBS | HEIGHT: 70 IN | BODY MASS INDEX: 42.23 KG/M2

## 2018-02-07 DIAGNOSIS — Z98.52 VASECTOMY STATUS: Primary | ICD-10-CM

## 2018-02-07 DIAGNOSIS — F41.9 ANXIETY: ICD-10-CM

## 2018-02-07 PROCEDURE — 99213 OFFICE O/P EST LOW 20 MIN: CPT | Performed by: NURSE PRACTITIONER

## 2018-02-07 RX ORDER — DIAZEPAM 10 MG/1
TABLET ORAL
Qty: 2 TABLET | Refills: 0 | Status: SHIPPED | OUTPATIENT
Start: 2018-02-07 | End: 2018-03-08

## 2018-02-07 NOTE — PROGRESS NOTES
"Chief Complaint:          Chief Complaint   Patient presents with   • Pre Op Vasectomy Appointment       HPI:   37 y.o. male here today to get prescriptions for upcoming vasectomy scheduled with Dr. Quinn on 02/22/2018. Patient also has a history of low testosterone and was checking on his prescription which was sent to [facility] on 01/03/2018 and informed him to check with his pharmacy. He denies any further issues.    HPI        Past Medical History:        Past Medical History:   Diagnosis Date   • Anger    • Anxiety    • Arthritis    • Chronic back pain    • Depression    • Fatigue    • History of heart murmur in childhood    • Hyperlipidemia    • Hypertension    • Hypothyroidism    • Obesity    • Positive urine drug screen 03/2016   • Screening PSA (prostate specific antigen) 2014         Current Meds:     Current Outpatient Prescriptions   Medication Sig Dispense Refill   • B-D 3CC LUER-ALBIN SYR 21GX1\" 21G X 1\" 3 ML misc   0   • BD DISP NEEDLES 20G X 1-1/2\" misc   0   • colestipol (COLESTID) 1 g tablet Take 1 tablet by mouth 2 (Two) Times a Day. 60 tablet 5   • diazePAM (VALIUM) 5 MG tablet Take 1 tablet by mouth Every 8 (Eight) Hours As Needed for Anxiety. 15 tablet 0   • escitalopram (LEXAPRO) 10 MG tablet Take 1.5 tablets by mouth Daily. 45 tablet 0   • fluticasone (FLONASE) 50 MCG/ACT nasal spray 2 sprays into each nostril Daily. 1 bottle 5   • lamoTRIgine (LaMICtal) 100 MG tablet Take 1 tablet by mouth Daily. 30 tablet 0   • levothyroxine (SYNTHROID, LEVOTHROID) 150 MCG tablet Take 1 tablet by mouth Daily. 30 tablet 5   • lisinopril (PRINIVIL,ZESTRIL) 5 MG tablet Take 2 tablets by mouth Daily. 30 tablet 5   • loratadine (CLARITIN) 10 MG tablet Take 1 tablet by mouth Daily. 30 tablet 5   • polyethylene glycol (GoLYTELY) 236 g solution Starting at 6pm the day before procedure, drink 8 ounces every 30 minutes until all gone or stools are clear. May add flavor packet. 4000 mL 0   • psyllium " (KONSYL) 100 % pack packet Take 1 packet by mouth Daily. If no packets, use 1 tsp daily 1 bottle 3   • Testosterone 75 MG pellet Place 75 mg on the skin Daily. 30 each 0   • Testosterone Cypionate (DEPOTESTOTERONE CYPIONATE) 200 MG/ML injection   0   • valACYclovir (VALTREX) 1000 MG tablet Take 1 tablet by mouth 2 (Two) Times a Day. 8 tablet 1   • diazePAM (VALIUM) 10 MG tablet Take one (1) tablet the night before the procedure and one (1) tablet day of procedure 2 tablet 0     No current facility-administered medications for this visit.         Allergies:      No Known Allergies     Past Surgical History:     Past Surgical History:   Procedure Laterality Date   • ABDOMINAL SURGERY     • CHOLECYSTECTOMY     • CHOLECYSTECTOMY WITH INTRAOPERATIVE CHOLANGIOGRAM N/A 9/5/2017    Procedure: CHOLECYSTECTOMY LAPAROSCOPIC INTRAOPERATIVE CHOLANGIOGRAM;  Surgeon: Joni Dawn MD;  Location: [facility];  Service:    • COLONOSCOPY N/A 1/4/2018    Procedure: COLONOSCOPY CPT CODE: 06587;  Surgeon: Remington Burroughs III, MD;  Location: [facility];  Service:    • GASTRIC BANDING  08/21/2016   • WISDOM TOOTH EXTRACTION           Social History:     Social History     Social History   • Marital status:      Spouse name: N/A   • Number of children: N/A   • Years of education: N/A     Occupational History   •       Social History Main Topics   • Smoking status: Never Smoker   • Smokeless tobacco: Current User     Types: Snuff   • Alcohol use No   • Drug use: No   • Sexual activity: Defer     Other Topics Concern   • Not on file     Social History Narrative       Family History:     Family History   Problem Relation Age of Onset   • Cancer Mother      breast   • Lymphoma Father    • Cancer Sister      lung        Review of Systems:     Review of Systems   Constitutional: Negative for chills, fatigue and fever.   Respiratory: Negative for cough, shortness of breath and wheezing.    Cardiovascular: Negative for  leg swelling.   Gastrointestinal: Negative for abdominal pain, nausea and vomiting.   Musculoskeletal: Negative for back pain and joint swelling.   Neurological: Negative for dizziness and headaches.   Psychiatric/Behavioral: Negative for confusion.       I have reviewed the following portions of the patient's history: allergies, current medications, past family history, past medical history, past social history, past surgical history, problem list and ROS and confirm it's accurate.    Physical Exam:     Physical Exam   Constitutional: He is oriented to person, place, and time. He appears well-developed and well-nourished. No distress.   Abdominal: Soft. Bowel sounds are normal. He exhibits no distension and no mass. There is no tenderness. There is no rebound and no guarding. No hernia.   Musculoskeletal: Normal range of motion.   Neurological: He is alert and oriented to person, place, and time.   Skin: Skin is warm and dry. No rash noted. He is not diaphoretic. No pallor.   Psychiatric: He has a normal mood and affect. His behavior is normal. Judgment and thought content normal.   Nursing note and vitals reviewed.      Procedure:     No notes on file      Assessment:     Encounter Diagnoses   Name Primary?   • Vasectomy status Yes   • Anxiety      No orders of the defined types were placed in this encounter.      Plan:   A prescription for Valium 10 mg to take 1 the day prior to the procedure and 1 the day of the procedure #2 with no refills given for upcoming vasectomy. Operative permit for a vasectomy in office procedure with Dr. Quinn was signed.     [facility] #[id number]    As part of the patient's treatment plan they are being prescribed a controlled substance/substances with potential for abuse. This patient has been made aware of the appropriate use of such medications, including potential risk of somnolence, limited ability to drive and/or work safely, and potential for overdose. It has been made clear these  medications are for use by the patient only, without concomitant use of alcohol or other substances unless prescribed/advised by the medical provider.    Patient has completed prescribing agreement detailing terms of controlled substances including monitoring Gonzalez reports. The patient is aware Gonzalez reports are reviewed on a regular basis and scanned into the chart.    Counseling was given to patient for the following topics patient and family education including: As discussed in the plan above. The interim medical history and current results were reviewed.  A treatment plan with follow-up was made for Vasectomy status [Z98.52]. I spent 15 minutes face to face with Brady Mcknight and 8 was spent in counseling.     Patient's BMI is above normal parameters. Follow-up plan includes:  educational material.  This document has been electronically signed by ALONDRA Coffey February 7, 2018 8:30 AM

## 2018-02-13 ENCOUNTER — DOCUMENTATION (OUTPATIENT)
Dept: UROLOGY | Facility: CLINIC | Age: 38
End: 2018-02-13

## 2018-02-13 NOTE — PROGRESS NOTES
Pt insurance does not require prior authorization for in office Vasectomy [facility] # [id number] [place]

## 2018-02-20 RX ORDER — LAMOTRIGINE 100 MG/1
100 TABLET ORAL DAILY
Qty: 30 TABLET | Refills: 2 | Status: SHIPPED | OUTPATIENT
Start: 2018-02-20 | End: 2018-03-08

## 2018-02-20 RX ORDER — ESCITALOPRAM OXALATE 10 MG/1
15 TABLET ORAL DAILY
Qty: 45 TABLET | Refills: 2 | Status: SHIPPED | OUTPATIENT
Start: 2018-02-20 | End: 2018-03-08 | Stop reason: SDUPTHER

## 2018-02-22 ENCOUNTER — PROCEDURE VISIT (OUTPATIENT)
Dept: UROLOGY | Facility: CLINIC | Age: 38
End: 2018-02-22

## 2018-02-22 DIAGNOSIS — Z98.52 VASECTOMY STATUS: Primary | ICD-10-CM

## 2018-02-22 PROCEDURE — 55250 REMOVAL OF SPERM DUCT(S): CPT | Performed by: UROLOGY

## 2018-02-22 RX ORDER — OXYCODONE AND ACETAMINOPHEN 10; 325 MG/1; MG/1
1 TABLET ORAL EVERY 6 HOURS PRN
Qty: 24 TABLET | Refills: 0 | Status: SHIPPED | OUTPATIENT
Start: 2018-02-22 | End: 2018-11-05

## 2018-02-22 RX ORDER — CEPHALEXIN 250 MG/1
250 CAPSULE ORAL 3 TIMES DAILY
Qty: 9 CAPSULE | Refills: 0 | Status: SHIPPED | OUTPATIENT
Start: 2018-02-22 | End: 2018-02-25

## 2018-02-22 NOTE — PROGRESS NOTES
"Chief Complaint:          Chief Complaint   Patient presents with   • Sterilization       HPI:   37 y.o. male.  T7-year-old white male with 2 children who presents for elective scrotal vasectomy.    Past Medical History:        Past Medical History:   Diagnosis Date   • Anger    • Anxiety    • Arthritis    • Chronic back pain    • Depression    • Fatigue    • History of heart murmur in childhood    • Hyperlipidemia    • Hypertension    • Hypothyroidism    • Obesity    • Positive urine drug screen 03/2016   • Screening PSA (prostate specific antigen) 2014         Current Meds:     Current Outpatient Prescriptions   Medication Sig Dispense Refill   • B-D 3CC LUER-ALBIN SYR 21GX1\" 21G X 1\" 3 ML misc   0   • BD DISP NEEDLES 20G X 1-1/2\" misc   0   • colestipol (COLESTID) 1 g tablet Take 1 tablet by mouth 2 (Two) Times a Day. 60 tablet 5   • diazePAM (VALIUM) 10 MG tablet Take one (1) tablet the night before the procedure and one (1) tablet day of procedure 2 tablet 0   • diazePAM (VALIUM) 5 MG tablet Take 1 tablet by mouth Every 8 (Eight) Hours As Needed for Anxiety. 15 tablet 0   • escitalopram (LEXAPRO) 10 MG tablet Take 1.5 tablets by mouth Daily. 45 tablet 2   • fluticasone (FLONASE) 50 MCG/ACT nasal spray 2 sprays into each nostril Daily. 1 bottle 5   • lamoTRIgine (LaMICtal) 100 MG tablet Take 1 tablet by mouth Daily. 30 tablet 2   • levothyroxine (SYNTHROID, LEVOTHROID) 150 MCG tablet Take 1 tablet by mouth Daily. 30 tablet 5   • lisinopril (PRINIVIL,ZESTRIL) 5 MG tablet Take 2 tablets by mouth Daily. 30 tablet 5   • loratadine (CLARITIN) 10 MG tablet Take 1 tablet by mouth Daily. 30 tablet 5   • polyethylene glycol (GoLYTELY) 236 g solution Starting at 6pm the day before procedure, drink 8 ounces every 30 minutes until all gone or stools are clear. May add flavor packet. 4000 mL 0   • psyllium (KONSYL) 100 % pack packet Take 1 packet by mouth Daily. If no packets, use 1 tsp daily 1 bottle 3   • Testosterone 75 MG " pellet Place 75 mg on the skin Daily. 30 each 0   • Testosterone Cypionate (DEPOTESTOTERONE CYPIONATE) 200 MG/ML injection   0   • valACYclovir (VALTREX) 1000 MG tablet Take 1 tablet by mouth 2 (Two) Times a Day. 8 tablet 1     No current facility-administered medications for this visit.         Allergies:      No Known Allergies     Past Surgical History:     Past Surgical History:   Procedure Laterality Date   • ABDOMINAL SURGERY     • CHOLECYSTECTOMY     • CHOLECYSTECTOMY WITH INTRAOPERATIVE CHOLANGIOGRAM N/A 9/5/2017    Procedure: CHOLECYSTECTOMY LAPAROSCOPIC INTRAOPERATIVE CHOLANGIOGRAM;  Surgeon: Joni Dawn MD;  Location: [facility] OR;  Service:    • COLONOSCOPY N/A 1/4/2018    Procedure: COLONOSCOPY CPT CODE: 18683;  Surgeon: Remington Burroughs III, MD;  Location: [facility] OR;  Service:    • GASTRIC BANDING  08/21/2016   • WISDOM TOOTH EXTRACTION           Social History:     Social History     Social History   • Marital status:      Spouse name: N/A   • Number of children: N/A   • Years of education: N/A     Occupational History   •       Social History Main Topics   • Smoking status: Never Smoker   • Smokeless tobacco: Current User     Types: Snuff   • Alcohol use No   • Drug use: No   • Sexual activity: Defer     Other Topics Concern   • Not on file     Social History Narrative       Family History:     Family History   Problem Relation Age of Onset   • Cancer Mother      breast   • Lymphoma Father    • Cancer Sister      lung        Review of Systems:     Review of Systems    Physical Exam:     Physical Exam    I have reviewed the following portions of the patient's history: allergies, current medications, past family history, past medical history, past social history, past surgical history, problem list and ROS and confirm it's accurate.      Procedure:   Elective scrotal vasectomy -After an appropriate informed consent including the risk of anesthesia, infection, scrotal  hematoma, failure in the range of 1 in 10,000, chronic testalgia,  low testosterone, as well as the other very rare complications identified.  He was brought to the operative suite his scrotum was shaved and prepped in a sterile fashion using Betadine.  Local anesthetic was infiltrated into the midline scrotal raphae. A midline scrotal incision was made and the right vas entrapped.  I anesthetized the spermatic cord and skin using 5 cc of 1% Xylocaine with epinephrine after an adequate period of analgesia I  identified the vas and brought out into the incision.  The fascia was divided.  The vas was then doubly ligated fulgurated and the and was sewn in the sheath away from the proximal end.  Bovie electrocautery had been used to fulgurate the end of both vases as per the AUA guidelines Hemostasis was excellent and it was allowed to fall back in the scrotal sac the identical procedure was done on the contralateral side.  The skin was closed with a 3-0 chromic sutures.  Hemostasis was excellent he was recommended to use ice pack with a shield covering the scrotum to protect it from the ice.  He was given pain medication and antibiotics.  The incision was covered with bacitracin ointment.  The patient will be seen in 8 weeks whereupon we will then check a semen analysis to confirm the absence of spermatozoa and therby declare sterility.    Assessment/Plan:   Vasectomy status  Narcotic pain medication-patient has significant acute pain that I believe would be an indication for the use of narcotic pain medication.  I discussed the significant risks of pain medication and the fact that this will be a short only option and I will give her no more than a three-day supply of pain medication.  And I will not plan long-term medication and that this will be sent to a pain clinic if at all becomes necessary.  We discussed signing a pain medication agreement and the fact that we're going to run a state [facility] review to be sure  the patient is not getting pain medication from elsewhere.  If this is the case we will not give pain medication.  As part of the patient's treatment plan of there being prescribed a controlled substance with potential for abuse.  This patient has been wait aware of the appropriate dose of such medications including, the risk for somnolence, limited ability to drive and/or safety and the significant potential for overdose.  It is been made clear that these medications are for the prescribed patient only without concomitant use of alcohol or other sepsis unless prescribed by the medical provider.  Has completed prescribing agreement detailing the terms of continue prescribing him a controlled substance.  Including monitoring Evans ports, the possibility of urine drug screens and pedal counts.  The patient is aware that we reviewed EVANS reports on a regular basis and scan them into the chart.  History and physical examination exhibited continued safe and appropriate use of controlled substances.  Also discussed the fact that the new [place] legislation allows only a three-day prescription for pain medication and Lasix in the face of chronic pain.  In this situation he will be referred to a chronic pain clinic.           This document has been electronically signed by DIANE WELCH MD February 22, 2018 8:04 AM

## 2018-03-07 ENCOUNTER — OFFICE VISIT (OUTPATIENT)
Dept: UROLOGY | Facility: CLINIC | Age: 38
End: 2018-03-07

## 2018-03-07 VITALS — HEIGHT: 70 IN | WEIGHT: 295 LBS | BODY MASS INDEX: 42.23 KG/M2

## 2018-03-07 DIAGNOSIS — Z98.52 VASECTOMY STATUS: Primary | ICD-10-CM

## 2018-03-07 PROCEDURE — 99024 POSTOP FOLLOW-UP VISIT: CPT | Performed by: NURSE PRACTITIONER

## 2018-03-07 NOTE — PROGRESS NOTES
"Chief Complaint:          Chief Complaint   Patient presents with   • Vasectomy Follow Up       HPI:   37 y.o. male seen today with his wife for concern of vasectomy incisions being infected. Patient is a status post vasectomy by Dr. Quinn on 02/22/2018. Wife states she has noticed some drainage on the patient's underwire and is concerned that the incision sites may not be healing well. He does complain of some tenderness in the right testicular region but denies any other complaints.     HPI        Past Medical History:        Past Medical History:   Diagnosis Date   • Anger    • Anxiety    • Arthritis    • Chronic back pain    • Depression    • Fatigue    • History of heart murmur in childhood    • Hyperlipidemia    • Hypertension    • Hypothyroidism    • Obesity    • Positive urine drug screen 03/2016   • Screening PSA (prostate specific antigen) 2014         Current Meds:     Current Outpatient Prescriptions   Medication Sig Dispense Refill   • B-D 3CC LUER-ALBIN SYR 21GX1\" 21G X 1\" 3 ML misc   0   • BD DISP NEEDLES 20G X 1-1/2\" misc   0   • colestipol (COLESTID) 1 g tablet Take 1 tablet by mouth 2 (Two) Times a Day. 60 tablet 5   • diazePAM (VALIUM) 10 MG tablet Take one (1) tablet the night before the procedure and one (1) tablet day of procedure 2 tablet 0   • diazePAM (VALIUM) 5 MG tablet Take 1 tablet by mouth Every 8 (Eight) Hours As Needed for Anxiety. 15 tablet 0   • escitalopram (LEXAPRO) 10 MG tablet Take 1.5 tablets by mouth Daily. 45 tablet 2   • fluticasone (FLONASE) 50 MCG/ACT nasal spray 2 sprays into each nostril Daily. 1 bottle 5   • lamoTRIgine (LaMICtal) 100 MG tablet Take 1 tablet by mouth Daily. 30 tablet 2   • levothyroxine (SYNTHROID, LEVOTHROID) 150 MCG tablet Take 1 tablet by mouth Daily. 30 tablet 5   • lisinopril (PRINIVIL,ZESTRIL) 5 MG tablet Take 2 tablets by mouth Daily. 30 tablet 5   • loratadine (CLARITIN) 10 MG tablet Take 1 tablet by mouth Daily. 30 tablet 5   • " oxyCODONE-acetaminophen (PERCOCET)  MG per tablet Take 1 tablet by mouth Every 6 (Six) Hours As Needed for Moderate Pain . 24 tablet 0   • polyethylene glycol (GoLYTELY) 236 g solution Starting at 6pm the day before procedure, drink 8 ounces every 30 minutes until all gone or stools are clear. May add flavor packet. 4000 mL 0   • psyllium (KONSYL) 100 % pack packet Take 1 packet by mouth Daily. If no packets, use 1 tsp daily 1 bottle 3   • Testosterone 75 MG pellet Place 75 mg on the skin Daily. 30 each 0   • Testosterone Cypionate (DEPOTESTOTERONE CYPIONATE) 200 MG/ML injection   0   • valACYclovir (VALTREX) 1000 MG tablet Take 1 tablet by mouth 2 (Two) Times a Day. 8 tablet 1     No current facility-administered medications for this visit.         Allergies:      No Known Allergies     Past Surgical History:     Past Surgical History:   Procedure Laterality Date   • ABDOMINAL SURGERY     • CHOLECYSTECTOMY     • CHOLECYSTECTOMY WITH INTRAOPERATIVE CHOLANGIOGRAM N/A 9/5/2017    Procedure: CHOLECYSTECTOMY LAPAROSCOPIC INTRAOPERATIVE CHOLANGIOGRAM;  Surgeon: Joni Dawn MD;  Location: [facility];  Service:    • COLONOSCOPY N/A 1/4/2018    Procedure: COLONOSCOPY CPT CODE: 98628;  Surgeon: Remington Burroughs III, MD;  Location: [facility] OR;  Service:    • GASTRIC BANDING  08/21/2016   • WISDOM TOOTH EXTRACTION           Social History:     Social History     Social History   • Marital status:      Spouse name: N/A   • Number of children: N/A   • Years of education: N/A     Occupational History   •       Social History Main Topics   • Smoking status: Never Smoker   • Smokeless tobacco: Current User     Types: Snuff   • Alcohol use No   • Drug use: No   • Sexual activity: Defer     Other Topics Concern   • Not on file     Social History Narrative       Family History:     Family History   Problem Relation Age of Onset   • Cancer Mother      breast   • Lymphoma Father    • Cancer Sister       lung        Review of Systems:     Review of Systems   Constitutional: Negative for chills, fatigue and fever.   Respiratory: Negative for cough, shortness of breath and wheezing.    Cardiovascular: Negative for leg swelling.   Gastrointestinal: Negative for abdominal pain, nausea and vomiting.   Musculoskeletal: Negative for back pain and joint swelling.   Neurological: Negative for dizziness and headaches.   Psychiatric/Behavioral: Negative for confusion.       I have reviewed the following portions of the patient's history: allergies, current medications, past family history, past medical history, past social history, past surgical history, problem list and ROS and confirm it's accurate.    Physical Exam:     Physical Exam   Constitutional: He is oriented to person, place, and time. He appears well-developed and well-nourished. No distress.   Abdominal: Soft. Bowel sounds are normal. He exhibits no distension and no mass. There is no tenderness. There is no rebound and no guarding. No hernia.   Genitourinary:   Genitourinary Comments: Scrotal incision site clean without drainage-healing well   Musculoskeletal: Normal range of motion.   Neurological: He is alert and oriented to person, place, and time.   Skin: Skin is warm and dry. No rash noted. He is not diaphoretic. No pallor.   Psychiatric: He has a normal mood and affect. His behavior is normal. Judgment and thought content normal.   Nursing note and vitals reviewed.      Procedure:     No notes on file      Assessment:     Encounter Diagnosis   Name Primary?   • Vasectomy status Yes     No orders of the defined types were placed in this encounter.      Plan:   Inspected the incision site which is clean without drainage and is healing well. Recommend the patient continue keeping the area clean and dry as instructed by Dr. Quinn. Further reminded him that after 20 ejaculations tube draining in a semen sample to be sent for a semen analysis and to continue  using some form of birth control until the analysis has been completed. Patient verbalized understanding. He does have a return appointment to come back and see Dr. Quinn and recommend he keep that appointment.    Counseling was given to patient and family for the following topics patient and family education including: As discussed in the plan above. The interim medical history and current results were reviewed.  A treatment plan with follow-up was made for Vasectomy status [Z98.52]. I spent 15 minutes face to face with Brady Mcknight and 10 was spent in counseling.    Patient's BMI is above normal parameters. Follow-up plan includes:  educational material   .   This document has been electronically signed by ALONDRA Coffey March 7, 2018 2:26 PM

## 2018-03-08 ENCOUNTER — OFFICE VISIT (OUTPATIENT)
Dept: PSYCHIATRY | Facility: CLINIC | Age: 38
End: 2018-03-08

## 2018-03-08 VITALS
BODY MASS INDEX: 46.65 KG/M2 | HEART RATE: 80 BPM | DIASTOLIC BLOOD PRESSURE: 88 MMHG | WEIGHT: 315 LBS | SYSTOLIC BLOOD PRESSURE: 154 MMHG | HEIGHT: 69 IN

## 2018-03-08 DIAGNOSIS — F39 UNSPECIFIED MOOD (AFFECTIVE) DISORDER (HCC): Primary | ICD-10-CM

## 2018-03-08 PROCEDURE — 99214 OFFICE O/P EST MOD 30 MIN: CPT | Performed by: NURSE PRACTITIONER

## 2018-03-08 RX ORDER — DIAZEPAM 5 MG/1
5 TABLET ORAL DAILY PRN
Qty: 15 TABLET | Refills: 0 | Status: SHIPPED | OUTPATIENT
Start: 2018-03-08 | End: 2019-03-08

## 2018-03-08 RX ORDER — ARIPIPRAZOLE 5 MG/1
5 TABLET ORAL DAILY
Qty: 30 TABLET | Refills: 0 | Status: SHIPPED | OUTPATIENT
Start: 2018-03-08 | End: 2018-04-06 | Stop reason: SDUPTHER

## 2018-03-08 RX ORDER — ESCITALOPRAM OXALATE 20 MG/1
20 TABLET ORAL DAILY
Qty: 30 TABLET | Refills: 0 | Status: SHIPPED | OUTPATIENT
Start: 2018-03-08 | End: 2018-04-13 | Stop reason: SDUPTHER

## 2018-03-08 RX ORDER — HYDROXYZINE PAMOATE 25 MG/1
25 CAPSULE ORAL 2 TIMES DAILY PRN
Qty: 60 CAPSULE | Refills: 0 | Status: SHIPPED | OUTPATIENT
Start: 2018-03-08 | End: 2018-11-05

## 2018-03-08 NOTE — PROGRESS NOTES
"  Rosemary Mcknight is a 37 y.o. male is here today for medication management follow-up at [facility], he presents to his appointment on time.     Chief Complaint: Mood     History of Present Illness Patient states that he is doing ok, but shares that he continues to get aggrivated and upset really quick.  He gets really anxious and mad easily.  He was helping out a \"buddy\" with work but couldn't because he was having issues with \"going out\" in the public.  He denies any SE or problems with no problems.  He shares that he rates his depression still very high, he has considered going to the hospital because he feels like \"something is going to have to give'.  He shares that he feels miserable, he feels like his energy levels are fine, waking up about every hour, he may getting about 5-6 hours per night with occasional random NM.  He shares that he isolates as much as he can.  Body mass index is 46.81 kg/(m^2).  He shares that he has a lap band and feels like his appetite is fine.  He denies any recent stressors other than \"being out of work\".  He shares that he had a vasectomy couple of weeks ago with checkup good, having some problems with HTN- recommended that he return to see his PCP regarding increase and no HTN medications.  He denies any AV hallucinations, denies any SI/HI- he continues to struggle with anger.      Discontinue the lamictal because it was not effective, begin abilify to assist with symptoms. Begin vistaril to assist with as needed anxiety, increase the lexapro for depression and anxiety.  Diazepam for ER anxiety.      The following portions of the patient's history were reviewed and updated as appropriate: allergies, current medications, past family history, past medical history, past social history, past surgical history and problem list.    Review of Systems   Constitutional: Negative for appetite change, chills, diaphoresis, fatigue, fever and unexpected weight change.   HENT: " "Negative for hearing loss, sore throat, trouble swallowing and voice change.    Eyes: Negative for photophobia and visual disturbance.   Respiratory: Negative for cough, chest tightness and shortness of breath.    Cardiovascular: Negative for chest pain and palpitations.   Gastrointestinal: Negative for abdominal pain, constipation, nausea and vomiting.   Endocrine: Negative for cold intolerance and heat intolerance.   Genitourinary: Negative for dysuria and frequency.   Musculoskeletal: Negative for arthralgias, back pain, joint swelling and neck stiffness.   Skin: Negative for color change and wound.   Allergic/Immunologic: Negative for environmental allergies and immunocompromised state.   Neurological: Negative for dizziness, tremors, seizures, syncope, weakness, light-headedness and headaches.   Hematological: Negative for adenopathy. Does not bruise/bleed easily.       Objective   Physical Exam   Constitutional: He appears well-developed and well-nourished. No distress.   Neurological: He is alert. Coordination and gait normal.   Vitals reviewed.    Blood pressure 154/88, pulse 80, height 175.3 cm (69\"), weight (!) 144 kg (317 lb).    Medication List:   Current Outpatient Prescriptions   Medication Sig Dispense Refill   • ARIPiprazole (ABILIFY) 5 MG tablet Take 1 tablet by mouth Daily. 30 tablet 0   • B-D 3CC LUER-ALBIN SYR 21GX1\" 21G X 1\" 3 ML misc   0   • BD DISP NEEDLES 20G X 1-1/2\" misc   0   • colestipol (COLESTID) 1 g tablet Take 1 tablet by mouth 2 (Two) Times a Day. 60 tablet 5   • diazePAM (VALIUM) 5 MG tablet Take 1 tablet by mouth Daily As Needed for Anxiety. 15 tablet 0   • escitalopram (LEXAPRO) 20 MG tablet Take 1 tablet by mouth Daily. 30 tablet 0   • fluticasone (FLONASE) 50 MCG/ACT nasal spray 2 sprays into each nostril Daily. 1 bottle 5   • hydrOXYzine (VISTARIL) 25 MG capsule Take 1 capsule by mouth 2 (Two) Times a Day As Needed for Anxiety. 60 capsule 0   • levothyroxine (SYNTHROID, " LEVOTHROID) 150 MCG tablet Take 1 tablet by mouth Daily. 30 tablet 5   • lisinopril (PRINIVIL,ZESTRIL) 5 MG tablet Take 2 tablets by mouth Daily. 30 tablet 5   • loratadine (CLARITIN) 10 MG tablet Take 1 tablet by mouth Daily. 30 tablet 5   • oxyCODONE-acetaminophen (PERCOCET)  MG per tablet Take 1 tablet by mouth Every 6 (Six) Hours As Needed for Moderate Pain . 24 tablet 0   • polyethylene glycol (GoLYTELY) 236 g solution Starting at 6pm the day before procedure, drink 8 ounces every 30 minutes until all gone or stools are clear. May add flavor packet. 4000 mL 0   • psyllium (KONSYL) 100 % pack packet Take 1 packet by mouth Daily. If no packets, use 1 tsp daily 1 bottle 3   • Testosterone 75 MG pellet Place 75 mg on the skin Daily. 30 each 0   • Testosterone Cypionate (DEPOTESTOTERONE CYPIONATE) 200 MG/ML injection   0   • valACYclovir (VALTREX) 1000 MG tablet Take 1 tablet by mouth 2 (Two) Times a Day. 8 tablet 1     No current facility-administered medications for this visit.        Mental Status Exam:   Hygiene:   good  Cooperation:  Cooperative  Eye Contact:  Fair  Psychomotor Behavior:  Appropriate  Affect:  Appropriate  Hopelessness: 3  Speech:  Normal  Thought Process:  Linear  Thought Content:  Mood congurent  Suicidal:  None  Homicidal:  None  Hallucinations:  None  Delusion:  None  Memory:  Intact  Orientation:  Person, Place, Time and Situation  Reliability:  fair  Insight:  Fair  Judgement:  Fair  Impulse Control:  Fair  Physical/Medical Issues:  No     Assessment/Plan   Problems Addressed this Visit     None      Visit Diagnoses     Unspecified mood (affective) disorder    -  Primary    Relevant Medications    escitalopram (LEXAPRO) 20 MG tablet    ARIPiprazole (ABILIFY) 5 MG tablet    diazePAM (VALIUM) 5 MG tablet    hydrOXYzine (VISTARIL) 25 MG capsule          Discussed medication options.  Reviewed the risks, benefits, and side effects of the medications; patient acknowledged and verbally  consented.  Patient is agreeable to call the [facility].  Patient is aware to call 911 or go to the nearest ER should begin having SI/HI.  Recommended therapy to address coping skills.    Prognosis: Guarded dependent on medication, follow up appointment and treatment plan compliance   Functionality:  Fair. Depression and anxiety is impacting the ability to maintain employment.      Return in 4 weeks

## 2018-03-19 ENCOUNTER — OFFICE VISIT (OUTPATIENT)
Dept: GASTROENTEROLOGY | Facility: CLINIC | Age: 38
End: 2018-03-19

## 2018-03-19 VITALS
HEIGHT: 69 IN | WEIGHT: 315 LBS | HEART RATE: 92 BPM | SYSTOLIC BLOOD PRESSURE: 162 MMHG | OXYGEN SATURATION: 90 % | BODY MASS INDEX: 46.65 KG/M2 | DIASTOLIC BLOOD PRESSURE: 92 MMHG

## 2018-03-19 DIAGNOSIS — R19.7 DIARRHEA, UNSPECIFIED TYPE: Primary | ICD-10-CM

## 2018-03-19 PROCEDURE — 99214 OFFICE O/P EST MOD 30 MIN: CPT | Performed by: PHYSICIAN ASSISTANT

## 2018-03-19 RX ORDER — MONTELUKAST SODIUM 4 MG/1
1 TABLET, CHEWABLE ORAL 2 TIMES DAILY
Qty: 60 TABLET | Refills: 5 | Status: SHIPPED | OUTPATIENT
Start: 2018-03-19 | End: 2018-11-05

## 2018-03-28 RX ORDER — LEVOTHYROXINE SODIUM 0.15 MG/1
150 TABLET ORAL DAILY
Qty: 30 TABLET | Refills: 0 | Status: SHIPPED | OUTPATIENT
Start: 2018-03-28 | End: 2018-05-15 | Stop reason: SDUPTHER

## 2018-04-03 ENCOUNTER — TELEPHONE (OUTPATIENT)
Dept: GASTROENTEROLOGY | Facility: CLINIC | Age: 38
End: 2018-04-03

## 2018-04-04 PROCEDURE — 89321 SEMEN ANAL SPERM DETECTION: CPT | Performed by: UROLOGY

## 2018-04-05 NOTE — TELEPHONE ENCOUNTER
"Patient states that Colestipol has \"changed his life\". He is now having regular bowel movements and wanted to thank you for helping him. He was instructed to call us back with any questions or concerns.  "

## 2018-04-09 RX ORDER — ARIPIPRAZOLE 5 MG/1
5 TABLET ORAL DAILY
Qty: 30 TABLET | Refills: 0 | Status: SHIPPED | OUTPATIENT
Start: 2018-04-09 | End: 2018-11-05

## 2018-04-10 ENCOUNTER — TELEPHONE (OUTPATIENT)
Dept: UROLOGY | Facility: CLINIC | Age: 38
End: 2018-04-10

## 2018-04-10 NOTE — TELEPHONE ENCOUNTER
Patient called requesting refills on testosterone. Dr. Quinn said he needs an appointment to re-evaluate. Patient already has a f/u appointment and will be keeping it.

## 2018-04-16 RX ORDER — ESCITALOPRAM OXALATE 20 MG/1
20 TABLET ORAL DAILY
Qty: 30 TABLET | Refills: 0 | Status: SHIPPED | OUTPATIENT
Start: 2018-04-16 | End: 2018-07-09 | Stop reason: SDUPTHER

## 2018-04-16 NOTE — TELEPHONE ENCOUNTER
Will refill but do you care to schedule him an appointment as well since he does not have one in the system that I am seeing. Thank you!

## 2018-04-17 ENCOUNTER — TELEPHONE (OUTPATIENT)
Dept: FAMILY MEDICINE CLINIC | Facility: CLINIC | Age: 38
End: 2018-04-17

## 2018-04-17 RX ORDER — OSELTAMIVIR PHOSPHATE 75 MG/1
75 CAPSULE ORAL 2 TIMES DAILY
Qty: 10 CAPSULE | Refills: 0 | Status: SHIPPED | OUTPATIENT
Start: 2018-04-17 | End: 2018-05-01

## 2018-04-17 NOTE — TELEPHONE ENCOUNTER
Wife called reported she was diagnosed with the flu on Sunday & now he & their son are sick,she is requesting Tamiflu for him & for their son.

## 2018-04-24 ENCOUNTER — OFFICE VISIT (OUTPATIENT)
Dept: UROLOGY | Facility: CLINIC | Age: 38
End: 2018-04-24

## 2018-04-24 VITALS — BODY MASS INDEX: 46.65 KG/M2 | WEIGHT: 315 LBS | HEIGHT: 69 IN

## 2018-04-24 DIAGNOSIS — E29.1 HYPOGONADISM IN MALE: Primary | ICD-10-CM

## 2018-04-24 DIAGNOSIS — Z98.52 VASECTOMY STATUS: ICD-10-CM

## 2018-04-24 LAB
BASOPHILS # BLD AUTO: 0.04 10*3/MM3 (ref 0–0.3)
BASOPHILS NFR BLD AUTO: 0.4 % (ref 0–2)
DEPRECATED RDW RBC AUTO: 46.9 FL (ref 37–54)
EOSINOPHIL # BLD AUTO: 0.31 10*3/MM3 (ref 0–0.7)
EOSINOPHIL NFR BLD AUTO: 3.2 % (ref 0–5)
ERYTHROCYTE [DISTWIDTH] IN BLOOD BY AUTOMATED COUNT: 14.6 % (ref 11.5–14.5)
HCT VFR BLD AUTO: 48 % (ref 42–52)
HGB BLD-MCNC: 16.2 G/DL (ref 14–18)
IMM GRANULOCYTES # BLD: 0.05 10*3/MM3 (ref 0–0.03)
IMM GRANULOCYTES NFR BLD: 0.5 % (ref 0–0.5)
LYMPHOCYTES # BLD AUTO: 2.49 10*3/MM3 (ref 1–3)
LYMPHOCYTES NFR BLD AUTO: 25.6 % (ref 21–51)
MCH RBC QN AUTO: 29.5 PG (ref 27–33)
MCHC RBC AUTO-ENTMCNC: 33.8 G/DL (ref 33–37)
MCV RBC AUTO: 87.4 FL (ref 80–94)
MONOCYTES # BLD AUTO: 1.03 10*3/MM3 (ref 0.1–0.9)
MONOCYTES NFR BLD AUTO: 10.6 % (ref 0–10)
NEUTROPHILS # BLD AUTO: 5.82 10*3/MM3 (ref 1.4–6.5)
NEUTROPHILS NFR BLD AUTO: 59.7 % (ref 30–70)
PLATELET # BLD AUTO: 260 10*3/MM3 (ref 130–400)
PMV BLD AUTO: 10.2 FL (ref 6–10)
PSA SERPL-MCNC: 0.39 NG/ML (ref 0–4)
RBC # BLD AUTO: 5.49 10*6/MM3 (ref 4.7–6.1)
TESTOST SERPL-MCNC: 253.56 NG/DL (ref 123.06–813.86)
WBC NRBC COR # BLD: 9.74 10*3/MM3 (ref 4.5–12.5)

## 2018-04-24 PROCEDURE — 99214 OFFICE O/P EST MOD 30 MIN: CPT | Performed by: UROLOGY

## 2018-04-24 PROCEDURE — 84153 ASSAY OF PSA TOTAL: CPT | Performed by: UROLOGY

## 2018-04-24 PROCEDURE — 84403 ASSAY OF TOTAL TESTOSTERONE: CPT | Performed by: UROLOGY

## 2018-04-24 PROCEDURE — 99024 POSTOP FOLLOW-UP VISIT: CPT | Performed by: UROLOGY

## 2018-04-24 PROCEDURE — 85025 COMPLETE CBC W/AUTO DIFF WBC: CPT | Performed by: UROLOGY

## 2018-04-24 RX ORDER — TESTOSTERONE CYPIONATE 200 MG/ML
INJECTION, SOLUTION INTRAMUSCULAR
Qty: 10 ML | Refills: 2 | Status: SHIPPED | OUTPATIENT
Start: 2018-04-24 | End: 2018-11-13 | Stop reason: SDUPTHER

## 2018-04-24 RX ORDER — HYDROCODONE BITARTRATE AND ACETAMINOPHEN 5; 325 MG/1; MG/1
TABLET ORAL
Qty: 12 TABLET | Refills: 0 | Status: SHIPPED | OUTPATIENT
Start: 2018-04-24

## 2018-04-24 NOTE — PROGRESS NOTES
"Chief Complaint:          Chief Complaint   Patient presents with   • Sterilization       HPI:   37 y.o. male.  37-year-old white male status post vasectomy doing well without complications repeat sperm count is negative.Patient returns today for follow-up.  He is been on testosterone replacement therapy.  He reports a dramatic improvement in his Nazario questionnaire: -NAZARIO-androgen deficiency in the age male questionnaire  The patient was queried regarding the androgen deficiency in the age male questionnaire.  This is a validated questionnaire that was performed on a set of 314 Spanish male physicians when it was positive it correlated directly with a 94% chance of low testosterone.  Patient indicates there is a decrease in libido or sex drive, a lack of energy, Decreased  strength and endurance, a decreased \"enjoyment of life\", sad and grumpy feelings with significant difficulty maintaining erections.  He is also been a recent deterioration regarding work performance.  He reports weight loss.  He has good facility and the use of subcutaneous and intramuscular injections as well as comfort level and using the medication in a sterile fashion.  He understands he should use only the prescribed dose.  He's here for appropriate lab monitoring regarding this.  He understands this is a controlled substance and therefore must be watched closely will not be refilled and the medical loss or miss calculation of the dose.  He is very happy with the treatment and therefore wants to continue it.    Past Medical History:        Past Medical History:   Diagnosis Date   • Anger    • Anxiety    • Arthritis    • Chronic back pain    • Depression    • Fatigue    • History of heart murmur in childhood    • Hyperlipidemia    • Hypertension    • Hypothyroidism    • Obesity    • Positive urine drug screen 03/2016   • Screening PSA (prostate specific antigen) 2014         Current Meds:     Current Outpatient Prescriptions   Medication Sig " "Dispense Refill   • ARIPiprazole (ABILIFY) 5 MG tablet TAKE 1 TABLET BY MOUTH DAILY 30 tablet 0   • B-D 3CC LUER-ALBIN SYR 21GX1\" 21G X 1\" 3 ML misc   0   • BD DISP NEEDLES 20G X 1-1/2\" misc   0   • colestipol (COLESTID) 1 g tablet Take 1 tablet by mouth 2 (Two) Times a Day. 60 tablet 5   • diazePAM (VALIUM) 5 MG tablet Take 1 tablet by mouth Daily As Needed for Anxiety. 15 tablet 0   • escitalopram (LEXAPRO) 20 MG tablet Take 1 tablet by mouth Daily. 30 tablet 0   • fluticasone (FLONASE) 50 MCG/ACT nasal spray 2 sprays into each nostril Daily. 1 bottle 5   • hydrOXYzine (VISTARIL) 25 MG capsule Take 1 capsule by mouth 2 (Two) Times a Day As Needed for Anxiety. 60 capsule 0   • levothyroxine (SYNTHROID, LEVOTHROID) 150 MCG tablet TAKE 1 TABLET BY MOUTH DAILY 30 tablet 0   • lisinopril (PRINIVIL,ZESTRIL) 5 MG tablet Take 2 tablets by mouth Daily. 30 tablet 5   • loratadine (CLARITIN) 10 MG tablet Take 1 tablet by mouth Daily. 30 tablet 5   • oseltamivir (TAMIFLU) 75 MG capsule Take 1 capsule by mouth 2 (Two) Times a Day. 10 capsule 0   • oxyCODONE-acetaminophen (PERCOCET)  MG per tablet Take 1 tablet by mouth Every 6 (Six) Hours As Needed for Moderate Pain . 24 tablet 0   • Testosterone 75 MG pellet Place 75 mg on the skin Daily. 30 each 0   • Testosterone Cypionate (DEPOTESTOTERONE CYPIONATE) 200 MG/ML injection   0   • valACYclovir (VALTREX) 1000 MG tablet Take 1 tablet by mouth 2 (Two) Times a Day. 8 tablet 1     No current facility-administered medications for this visit.         Allergies:      No Known Allergies     Past Surgical History:     Past Surgical History:   Procedure Laterality Date   • ABDOMINAL SURGERY     • CHOLECYSTECTOMY     • CHOLECYSTECTOMY WITH INTRAOPERATIVE CHOLANGIOGRAM N/A 9/5/2017    Procedure: CHOLECYSTECTOMY LAPAROSCOPIC INTRAOPERATIVE CHOLANGIOGRAM;  Surgeon: Joni Dawn MD;  Location: [facility];  Service:    • COLONOSCOPY N/A 1/4/2018    Procedure: COLONOSCOPY CPT CODE: " [id number];  Surgeon: Remington Burroughs III, MD;  Location: [facility];  Service:    • GASTRIC BANDING  08/21/2016   • WISDOM TOOTH EXTRACTION           Social History:     Social History     Social History   • Marital status:      Spouse name: N/A   • Number of children: N/A   • Years of education: N/A     Occupational History   •       Social History Main Topics   • Smoking status: Light Tobacco Smoker   • Smokeless tobacco: Current User     Types: Snuff   • Alcohol use 18.0 oz/week     30 Cans of beer per week      Comment: Drinks a 30 pack of beer and one bottle of whiskey  per week.   • Drug use: No   • Sexual activity: Defer     Other Topics Concern   • Not on file     Social History Narrative   • No narrative on file       Family History:     Family History   Problem Relation Age of Onset   • Cancer Mother      breast   • Lymphoma Father    • Cancer Sister      lung        Review of Systems:     Review of Systems   Constitutional: Negative.    HENT: Negative.    Eyes: Negative.    Respiratory: Negative.    Cardiovascular: Negative.    Gastrointestinal: Negative.    Endocrine: Negative.    Musculoskeletal: Negative.    Allergic/Immunologic: Negative.    Neurological: Negative.    Hematological: Negative.    Psychiatric/Behavioral: Negative.        Physical Exam:     Physical Exam   Constitutional: He is oriented to person, place, and time. He appears well-developed and well-nourished.   HENT:   Head: Normocephalic and atraumatic.   Eyes: Conjunctivae and EOM are normal. Pupils are equal, round, and reactive to light.   Neck: Normal range of motion.   Cardiovascular: Normal rate, regular rhythm, normal heart sounds and intact distal pulses.    Pulmonary/Chest: Effort normal and breath sounds normal.   Abdominal: Soft. Bowel sounds are normal.   Genitourinary: Penis normal.   Musculoskeletal: Normal range of motion.   Neurological: He is alert and oriented to person, place, and time. He has  normal reflexes.   Skin: Skin is warm and dry.   Psychiatric: He has a normal mood and affect. His behavior is normal. Judgment and thought content normal.   Nursing note and vitals reviewed.      I have reviewed the following portions of the patient's history: allergies, current medications, past family history, past medical history, past social history, past surgical history, problem list and ROS and confirm it's accurate.      Procedure:       Assessment/Plan:   Vasectomy status-sperm count is negative  -Low testosterone: patient is here for follow-up.  Since beginning the medication he's been very pleased.  He reports a dramatic improvement in his erections, ability to achieve and maintain an erection, improvement in libido, increase in frequency of morning erections, a noticeable weight loss consistent with the treatment.  No development of breast problems or abnormalities.  He's going to have appropriate safety laboratory parameters checked.   He understands that the new data implicates testosterone with the development of prostate cancer and this is all but been disproven and the medical literature as well as the risks of cardiovascular disease which is actually also been disproven.  He understands that while he is a candidate for topical therapy if he is in contact with children this is not an option because it's been shown to accentuate genitalia development at an early age that this frequently irreversible.  He also understands this is a controlled substance and as such will not be prescribed without appropriate follow-up and appropriate laboratory investigation.  He understands effects on spermatogenesis including the fact that this is not always completely reversible and not always completely limited his ability to father a child.  He has demonstrated facility in the technique of both intramuscular and subcutaneous injection.  And has been taught sterility one drawing up the medication.     Patient's  Body mass index is 47.53 kg/m². BMI is above normal parameters. Follow-up plan includes:  educational material.          This document has been electronically signed by DIANE WELCH MD April 24, 2018 8:28 AM

## 2018-05-01 ENCOUNTER — OFFICE VISIT (OUTPATIENT)
Dept: FAMILY MEDICINE CLINIC | Facility: CLINIC | Age: 38
End: 2018-05-01

## 2018-05-01 VITALS
DIASTOLIC BLOOD PRESSURE: 96 MMHG | HEART RATE: 81 BPM | SYSTOLIC BLOOD PRESSURE: 160 MMHG | HEIGHT: 69 IN | WEIGHT: 309 LBS | BODY MASS INDEX: 45.77 KG/M2 | OXYGEN SATURATION: 98 %

## 2018-05-01 DIAGNOSIS — IMO0001 CLASS 3 OBESITY WITH SERIOUS COMORBIDITY AND BODY MASS INDEX (BMI) OF 45.0 TO 49.9 IN ADULT, UNSPECIFIED OBESITY TYPE: ICD-10-CM

## 2018-05-01 DIAGNOSIS — I10 BENIGN ESSENTIAL HTN: Primary | ICD-10-CM

## 2018-05-01 PROBLEM — E66.9 OBESITY: Status: ACTIVE | Noted: 2018-05-01

## 2018-05-01 PROCEDURE — 99214 OFFICE O/P EST MOD 30 MIN: CPT | Performed by: NURSE PRACTITIONER

## 2018-05-01 RX ORDER — LISINOPRIL 10 MG/1
10 TABLET ORAL DAILY
Qty: 30 TABLET | Refills: 3 | Status: SHIPPED | OUTPATIENT
Start: 2018-05-01 | End: 2018-09-21 | Stop reason: SDUPTHER

## 2018-05-01 RX ORDER — DIAZEPAM 5 MG/1
5 TABLET ORAL DAILY PRN
Qty: 15 TABLET | Refills: 0 | Status: CANCELLED | OUTPATIENT
Start: 2018-05-01 | End: 2019-05-01

## 2018-05-01 RX ORDER — ESCITALOPRAM OXALATE 20 MG/1
20 TABLET ORAL DAILY
Qty: 30 TABLET | Refills: 0 | Status: CANCELLED | OUTPATIENT
Start: 2018-05-01 | End: 2019-05-01

## 2018-05-01 RX ORDER — ARIPIPRAZOLE 5 MG/1
5 TABLET ORAL DAILY
Qty: 30 TABLET | Refills: 0 | Status: CANCELLED | OUTPATIENT
Start: 2018-05-01

## 2018-05-01 NOTE — PROGRESS NOTES
"Subjective   Brady Mcknight is a 37 y.o. male.   Chief Compliant: The patient presents with Hypertension    Hypertension   This is a chronic (Known HTN with Obesity.  Off blood pressure medication with bariatic surgery.  Now with blood pressure flucuations on adipex from bariatric surgeon for slowed weight loss. ) problem. Progression since onset: In the past year able ot stay off HTN med until now flucuating over the past couple of weeks  The problem is uncontrolled. Associated symptoms include anxiety. Pertinent negatives include no blurred vision, chest pain, headaches, malaise/fatigue, neck pain, orthopnea, palpitations, peripheral edema, PND, shortness of breath or sweats. Risk factors for coronary artery disease include family history, obesity, sedentary lifestyle and stress. Past treatments include ACE inhibitors. Current antihypertension treatment includes nothing. Compliance problems include exercise (lap banding).       The following portions of the patient's history were reviewed and updated as appropriate: allergies, current medications, past family history, past medical history, past social history, past surgical history and problem list.      Review of Systems   Constitutional: Negative.  Negative for malaise/fatigue.   HENT: Negative.    Eyes: Negative for blurred vision.   Respiratory: Negative.  Negative for shortness of breath.    Cardiovascular: Negative.  Negative for chest pain, palpitations, orthopnea and PND.   Musculoskeletal: Negative for neck pain.   Neurological: Negative for headaches.   Psychiatric/Behavioral: Positive for agitation (continues to follow with psych) and decreased concentration. The patient is nervous/anxious.    All other systems reviewed and are negative.      Procedures    Vitals: Blood pressure 160/96, pulse 81, height 175.3 cm (69\"), weight (!) 140 kg (309 lb), SpO2 98 %.     Allergies: No Known Allergies       Objective   Physical Exam   Constitutional: He is oriented to " person, place, and time. He appears well-developed and well-nourished. No distress.   HENT:   Head: Normocephalic.   Neck: Neck supple.   Cardiovascular: Normal rate, regular rhythm and normal heart sounds.    No murmur heard.  Pulmonary/Chest: Effort normal and breath sounds normal. No respiratory distress.   Neurological: He is alert and oriented to person, place, and time.   Skin: Skin is warm and dry. He is not diaphoretic.   Psychiatric: He has a normal mood and affect. His behavior is normal.   Nursing note and vitals reviewed.      During this visit the following were done:  Labs Reviewed []    Labs Ordered []    Radiology Reports Reviewed []    Radiology Ordered []    PCP Records Reviewed []    Referring Provider Records Reviewed []    ER Records Reviewed []    Hospital Records Reviewed []    History Obtained From Family []    Radiology Images Reviewed []    Other Reviewed []    Records Requested []      Assessment/Plan   Discussed with patient impression and plan, patient verbalizes understanding  Stop adipex and restart lisinopril  Continue with psych      Brady was seen today for hypertension.    Diagnoses and all orders for this visit:    Benign essential HTN    Class 3 obesity with serious comorbidity and body mass index (BMI) of 45.0 to 49.9 in adult, unspecified obesity type    Other orders  -     Cancel: escitalopram (LEXAPRO) 20 MG tablet; Take 1 tablet by mouth Daily.  -     Cancel: diazePAM (VALIUM) 5 MG tablet; Take 1 tablet by mouth Daily As Needed for Anxiety.  -     Cancel: ARIPiprazole (ABILIFY) 5 MG tablet; Take 1 tablet by mouth Daily.  -     lisinopril (PRINIVIL,ZESTRIL) 10 MG tablet; Take 1 tablet by mouth Daily.

## 2018-05-03 ENCOUNTER — TELEPHONE (OUTPATIENT)
Dept: FAMILY MEDICINE CLINIC | Facility: CLINIC | Age: 38
End: 2018-05-03

## 2018-05-03 DIAGNOSIS — E03.9 HYPOTHYROIDISM, UNSPECIFIED TYPE: Primary | ICD-10-CM

## 2018-05-03 NOTE — TELEPHONE ENCOUNTER
Patient called for a refill on his Levothyroxine,there was a note on last refill that he needed a follow up & labs before next refill,he had visit 5/01/18 but no lab,i placed the order & he is going to come in in the am for lab draw.

## 2018-05-15 ENCOUNTER — TELEPHONE (OUTPATIENT)
Dept: FAMILY MEDICINE CLINIC | Facility: CLINIC | Age: 38
End: 2018-05-15

## 2018-05-15 ENCOUNTER — LAB (OUTPATIENT)
Dept: FAMILY MEDICINE CLINIC | Facility: CLINIC | Age: 38
End: 2018-05-15

## 2018-05-15 DIAGNOSIS — E03.9 HYPOTHYROIDISM, UNSPECIFIED TYPE: ICD-10-CM

## 2018-05-15 DIAGNOSIS — E03.9 HYPOTHYROIDISM, UNSPECIFIED TYPE: Primary | ICD-10-CM

## 2018-05-15 LAB
T4 FREE SERPL-MCNC: 0.93 NG/DL (ref 0.89–1.76)
TSH SERPL DL<=0.05 MIU/L-ACNC: 8.69 MIU/ML (ref 0.55–4.78)

## 2018-05-15 PROCEDURE — 36415 COLL VENOUS BLD VENIPUNCTURE: CPT

## 2018-05-15 PROCEDURE — 84439 ASSAY OF FREE THYROXINE: CPT | Performed by: NURSE PRACTITIONER

## 2018-05-15 PROCEDURE — 84443 ASSAY THYROID STIM HORMONE: CPT | Performed by: NURSE PRACTITIONER

## 2018-05-15 RX ORDER — LEVOTHYROXINE SODIUM 175 UG/1
175 TABLET ORAL DAILY
Qty: 30 TABLET | Refills: 1 | Status: SHIPPED | OUTPATIENT
Start: 2018-05-15 | End: 2018-07-09 | Stop reason: SDUPTHER

## 2018-05-15 NOTE — TELEPHONE ENCOUNTER
----- Message from ALONDRA Li sent at 5/15/2018  2:51 PM EDT -----  Synthroid needs increased to 175 mcg daily        Patient notified & verbalized understanding.

## 2018-05-15 NOTE — TELEPHONE ENCOUNTER
----- Message from ALONDRA Li sent at 5/15/2018  2:52 PM EDT -----  Needs repeat TSH in 4-6 weeks    Patient notified & verbalized understanding.

## 2018-05-21 ENCOUNTER — TELEPHONE (OUTPATIENT)
Dept: UROLOGY | Facility: CLINIC | Age: 38
End: 2018-05-21

## 2018-05-29 ENCOUNTER — OFFICE VISIT (OUTPATIENT)
Dept: FAMILY MEDICINE CLINIC | Facility: CLINIC | Age: 38
End: 2018-05-29

## 2018-05-29 VITALS
WEIGHT: 310 LBS | HEIGHT: 69 IN | OXYGEN SATURATION: 98 % | HEART RATE: 82 BPM | BODY MASS INDEX: 45.91 KG/M2 | SYSTOLIC BLOOD PRESSURE: 138 MMHG | DIASTOLIC BLOOD PRESSURE: 89 MMHG

## 2018-05-29 DIAGNOSIS — E07.9 DISEASE OF THYROID GLAND: ICD-10-CM

## 2018-05-29 DIAGNOSIS — I10 BENIGN ESSENTIAL HTN: Primary | ICD-10-CM

## 2018-05-29 DIAGNOSIS — E03.9 HYPOTHYROIDISM, UNSPECIFIED TYPE: ICD-10-CM

## 2018-05-29 LAB
ALBUMIN SERPL-MCNC: 4.4 G/DL (ref 3.5–5)
ALBUMIN/GLOB SERPL: 1.5 G/DL (ref 1.5–2.5)
ALP SERPL-CCNC: 67 U/L (ref 40–129)
ALT SERPL W P-5'-P-CCNC: 20 U/L (ref 10–44)
ANION GAP SERPL CALCULATED.3IONS-SCNC: 4.4 MMOL/L (ref 3.6–11.2)
AST SERPL-CCNC: 27 U/L (ref 10–34)
BILIRUB SERPL-MCNC: 0.7 MG/DL (ref 0.2–1.8)
BUN BLD-MCNC: 10 MG/DL (ref 7–21)
BUN/CREAT SERPL: 11.6 (ref 7–25)
CALCIUM SPEC-SCNC: 9.1 MG/DL (ref 7.7–10)
CHLORIDE SERPL-SCNC: 103 MMOL/L (ref 99–112)
CHOLEST SERPL-MCNC: 169 MG/DL (ref 0–200)
CO2 SERPL-SCNC: 31.6 MMOL/L (ref 24.3–31.9)
CREAT BLD-MCNC: 0.86 MG/DL (ref 0.43–1.29)
GFR SERPL CREATININE-BSD FRML MDRD: 98 ML/MIN/1.73
GLOBULIN UR ELPH-MCNC: 2.9 GM/DL
GLUCOSE BLD-MCNC: 80 MG/DL (ref 70–110)
HDLC SERPL-MCNC: 54 MG/DL (ref 60–100)
LDLC SERPL CALC-MCNC: 87 MG/DL (ref 0–100)
LDLC/HDLC SERPL: 1.6 {RATIO}
OSMOLALITY SERPL CALC.SUM OF ELEC: 275.6 MOSM/KG (ref 273–305)
POTASSIUM BLD-SCNC: 4 MMOL/L (ref 3.5–5.3)
PROT SERPL-MCNC: 7.3 G/DL (ref 6–8)
SODIUM BLD-SCNC: 139 MMOL/L (ref 135–153)
TRIGL SERPL-MCNC: 142 MG/DL (ref 0–150)
TSH SERPL DL<=0.05 MIU/L-ACNC: 3.41 MIU/ML (ref 0.55–4.78)
VLDLC SERPL-MCNC: 28.4 MG/DL

## 2018-05-29 PROCEDURE — 80061 LIPID PANEL: CPT | Performed by: NURSE PRACTITIONER

## 2018-05-29 PROCEDURE — 80053 COMPREHEN METABOLIC PANEL: CPT | Performed by: NURSE PRACTITIONER

## 2018-05-29 PROCEDURE — 84443 ASSAY THYROID STIM HORMONE: CPT | Performed by: NURSE PRACTITIONER

## 2018-05-29 PROCEDURE — 99213 OFFICE O/P EST LOW 20 MIN: CPT | Performed by: NURSE PRACTITIONER

## 2018-05-29 PROCEDURE — 36415 COLL VENOUS BLD VENIPUNCTURE: CPT | Performed by: NURSE PRACTITIONER

## 2018-05-29 NOTE — PROGRESS NOTES
"Subjective   Brady Mcknight is a 38 y.o. male.   Chief Compliant: The patient presents with Thyroid Problem (follow up) and Follow-up    Hypertension   This is a chronic (Returns today after restart of lisinopril and stopping Adipex from bariatric physican ) problem. The problem has been gradually improving (In the past year able ot stay off HTN med until now flucuating over the past couple of weeks ) since onset. Associated symptoms include anxiety. Pertinent negatives include no blurred vision, chest pain, headaches, malaise/fatigue, neck pain, orthopnea, palpitations, peripheral edema, PND, shortness of breath or sweats. Risk factors for coronary artery disease include family history, obesity, sedentary lifestyle and stress. Past treatments include ACE inhibitors. Current antihypertension treatment includes nothing. Compliance problems include exercise (lap banding).  Identifiable causes of hypertension include a thyroid problem.   Thyroid Problem   Presents for follow-up (Follow up today for labs.  Recent change of medications) visit. Patient reports no palpitations. (Denies any symptoms  ) The symptoms have been stable.      The following portions of the patient's history were reviewed and updated as appropriate: allergies, current medications, past family history, past medical history, past social history, past surgical history and problem list.      Review of Systems   Constitutional: Negative.  Negative for malaise/fatigue.   HENT: Negative.    Eyes: Negative for blurred vision.   Respiratory: Negative.  Negative for shortness of breath.    Cardiovascular: Negative.  Negative for chest pain, palpitations, orthopnea and PND.   Musculoskeletal: Negative for neck pain.   Neurological: Negative for headaches.   Psychiatric/Behavioral: Agitation: continues to follow with psych    All other systems reviewed and are negative.      Procedures    Vitals: Blood pressure 138/89, pulse 82, height 175.3 cm (69\"), weight (!) " 141 kg (310 lb), SpO2 98 %.     Allergies: No Known Allergies       Objective   Physical Exam   Constitutional: He is oriented to person, place, and time. He appears well-developed and well-nourished. No distress.   HENT:   Head: Normocephalic.   Neck: Neck supple.   Cardiovascular: Normal rate, regular rhythm and normal heart sounds.    No murmur heard.  Pulmonary/Chest: Effort normal and breath sounds normal. No respiratory distress.   Neurological: He is alert and oriented to person, place, and time.   Skin: Skin is warm and dry. He is not diaphoretic.   Psychiatric: He has a normal mood and affect. His behavior is normal.   Nursing note and vitals reviewed.      During this visit the following were done:  Labs Reviewed []    Labs Ordered [x]    Radiology Reports Reviewed []    Radiology Ordered []    PCP Records Reviewed []    Referring Provider Records Reviewed []    ER Records Reviewed []    Hospital Records Reviewed []    History Obtained From Family []    Radiology Images Reviewed []    Other Reviewed []    Records Requested []      Assessment/Plan   Discussed with patient impression and plan, patient verbalizes understanding  Continue with routine medications    Brady was seen today for thyroid problem and follow-up.    Diagnoses and all orders for this visit:    Benign essential HTN  -     Comprehensive Metabolic Panel  -     Lipid Panel    Disease of thyroid gland    Hypothyroidism, unspecified type  -     TSH

## 2018-05-30 ENCOUNTER — TELEPHONE (OUTPATIENT)
Dept: FAMILY MEDICINE CLINIC | Facility: CLINIC | Age: 38
End: 2018-05-30

## 2018-05-30 NOTE — TELEPHONE ENCOUNTER
----- Message from ALONDRA Li sent at 5/29/2018  4:47 PM EDT -----  Tell him to stay on his current dose of synthroid      Patient notified & verbalized understanding.

## 2018-05-31 ENCOUNTER — TELEPHONE (OUTPATIENT)
Dept: UROLOGY | Facility: CLINIC | Age: 38
End: 2018-05-31

## 2018-05-31 NOTE — TELEPHONE ENCOUNTER
PA FOR TESTOSTERONE WAS DENIED. CALLED INSURANCE AND VERBALLY GAVE LAST LAB LEVEL FOR RE-DETERMINATION. RESPONSE SHOULD BE FAXED WITHIN 24 HOURS.

## 2018-06-08 ENCOUNTER — APPOINTMENT (OUTPATIENT)
Dept: GENERAL RADIOLOGY | Facility: HOSPITAL | Age: 38
End: 2018-06-08

## 2018-06-08 ENCOUNTER — HOSPITAL ENCOUNTER (EMERGENCY)
Facility: HOSPITAL | Age: 38
Discharge: LEFT BEFORE DISCHARGE | End: 2018-06-08
Attending: EMERGENCY MEDICINE | Admitting: EMERGENCY MEDICINE

## 2018-06-08 VITALS
RESPIRATION RATE: 16 BRPM | SYSTOLIC BLOOD PRESSURE: 116 MMHG | DIASTOLIC BLOOD PRESSURE: 76 MMHG | OXYGEN SATURATION: 100 % | HEART RATE: 77 BPM | BODY MASS INDEX: 41.47 KG/M2 | HEIGHT: 69 IN | TEMPERATURE: 97.5 F | WEIGHT: 280 LBS

## 2018-06-08 DIAGNOSIS — R07.9 CHEST PAIN, UNSPECIFIED TYPE: Primary | ICD-10-CM

## 2018-06-08 LAB
ALBUMIN SERPL-MCNC: 4.4 G/DL (ref 3.5–5)
ALBUMIN/GLOB SERPL: 1.5 G/DL (ref 1.5–2.5)
ALP SERPL-CCNC: 65 U/L (ref 40–129)
ALT SERPL W P-5'-P-CCNC: 19 U/L (ref 10–44)
AMYLASE SERPL-CCNC: 53 U/L (ref 28–100)
ANION GAP SERPL CALCULATED.3IONS-SCNC: 1.5 MMOL/L (ref 3.6–11.2)
APTT PPP: 30.1 SECONDS (ref 23.8–36.1)
AST SERPL-CCNC: 21 U/L (ref 10–34)
BASOPHILS # BLD AUTO: 0.03 10*3/MM3 (ref 0–0.3)
BASOPHILS NFR BLD AUTO: 0.3 % (ref 0–2)
BILIRUB SERPL-MCNC: 0.9 MG/DL (ref 0.2–1.8)
BNP SERPL-MCNC: <2 PG/ML (ref 0–100)
BUN BLD-MCNC: 9 MG/DL (ref 7–21)
BUN/CREAT SERPL: 10.3 (ref 7–25)
CALCIUM SPEC-SCNC: 9.1 MG/DL (ref 7.7–10)
CHLORIDE SERPL-SCNC: 108 MMOL/L (ref 99–112)
CO2 SERPL-SCNC: 27.5 MMOL/L (ref 24.3–31.9)
CREAT BLD-MCNC: 0.87 MG/DL (ref 0.43–1.29)
D DIMER PPP FEU-MCNC: <0.27 MCGFEU/ML (ref 0–0.5)
DEPRECATED RDW RBC AUTO: 45.6 FL (ref 37–54)
EOSINOPHIL # BLD AUTO: 0.13 10*3/MM3 (ref 0–0.7)
EOSINOPHIL NFR BLD AUTO: 1.1 % (ref 0–5)
ERYTHROCYTE [DISTWIDTH] IN BLOOD BY AUTOMATED COUNT: 14.2 % (ref 11.5–14.5)
ETHANOL BLD-MCNC: <10 MG/DL
ETHANOL UR QL: <0.01 %
GFR SERPL CREATININE-BSD FRML MDRD: 97 ML/MIN/1.73
GLOBULIN UR ELPH-MCNC: 3 GM/DL
GLUCOSE BLD-MCNC: 79 MG/DL (ref 70–110)
HCT VFR BLD AUTO: 46.1 % (ref 42–52)
HGB BLD-MCNC: 15.6 G/DL (ref 14–18)
HOLD SPECIMEN: NORMAL
HOLD SPECIMEN: NORMAL
IMM GRANULOCYTES # BLD: 0.05 10*3/MM3 (ref 0–0.03)
IMM GRANULOCYTES NFR BLD: 0.4 % (ref 0–0.5)
INR PPP: 1.04 (ref 0.9–1.1)
LIPASE SERPL-CCNC: 27 U/L (ref 13–60)
LYMPHOCYTES # BLD AUTO: 2.67 10*3/MM3 (ref 1–3)
LYMPHOCYTES NFR BLD AUTO: 22.8 % (ref 21–51)
MCH RBC QN AUTO: 29.9 PG (ref 27–33)
MCHC RBC AUTO-ENTMCNC: 33.8 G/DL (ref 33–37)
MCV RBC AUTO: 88.3 FL (ref 80–94)
MONOCYTES # BLD AUTO: 1.1 10*3/MM3 (ref 0.1–0.9)
MONOCYTES NFR BLD AUTO: 9.4 % (ref 0–10)
NEUTROPHILS # BLD AUTO: 7.74 10*3/MM3 (ref 1.4–6.5)
NEUTROPHILS NFR BLD AUTO: 66 % (ref 30–70)
OSMOLALITY SERPL CALC.SUM OF ELEC: 271.4 MOSM/KG (ref 273–305)
PLATELET # BLD AUTO: 254 10*3/MM3 (ref 130–400)
PMV BLD AUTO: 10 FL (ref 6–10)
POTASSIUM BLD-SCNC: 3.6 MMOL/L (ref 3.5–5.3)
PROT SERPL-MCNC: 7.4 G/DL (ref 6–8)
PROTHROMBIN TIME: 13.8 SECONDS (ref 11–15.4)
RBC # BLD AUTO: 5.22 10*6/MM3 (ref 4.7–6.1)
SODIUM BLD-SCNC: 137 MMOL/L (ref 135–153)
TROPONIN I SERPL-MCNC: <0.006 NG/ML
TROPONIN I SERPL-MCNC: <0.006 NG/ML
WBC NRBC COR # BLD: 11.72 10*3/MM3 (ref 4.5–12.5)
WHOLE BLOOD HOLD SPECIMEN: NORMAL
WHOLE BLOOD HOLD SPECIMEN: NORMAL

## 2018-06-08 PROCEDURE — 84484 ASSAY OF TROPONIN QUANT: CPT | Performed by: EMERGENCY MEDICINE

## 2018-06-08 PROCEDURE — 93005 ELECTROCARDIOGRAM TRACING: CPT | Performed by: EMERGENCY MEDICINE

## 2018-06-08 PROCEDURE — 71045 X-RAY EXAM CHEST 1 VIEW: CPT

## 2018-06-08 PROCEDURE — 83690 ASSAY OF LIPASE: CPT | Performed by: PHYSICIAN ASSISTANT

## 2018-06-08 PROCEDURE — 80053 COMPREHEN METABOLIC PANEL: CPT | Performed by: EMERGENCY MEDICINE

## 2018-06-08 PROCEDURE — 71045 X-RAY EXAM CHEST 1 VIEW: CPT | Performed by: RADIOLOGY

## 2018-06-08 PROCEDURE — 99285 EMERGENCY DEPT VISIT HI MDM: CPT

## 2018-06-08 PROCEDURE — 93010 ELECTROCARDIOGRAM REPORT: CPT | Performed by: INTERNAL MEDICINE

## 2018-06-08 PROCEDURE — 85379 FIBRIN DEGRADATION QUANT: CPT | Performed by: PHYSICIAN ASSISTANT

## 2018-06-08 PROCEDURE — 83880 ASSAY OF NATRIURETIC PEPTIDE: CPT | Performed by: PHYSICIAN ASSISTANT

## 2018-06-08 PROCEDURE — 82150 ASSAY OF AMYLASE: CPT | Performed by: PHYSICIAN ASSISTANT

## 2018-06-08 PROCEDURE — 85610 PROTHROMBIN TIME: CPT | Performed by: PHYSICIAN ASSISTANT

## 2018-06-08 PROCEDURE — 80307 DRUG TEST PRSMV CHEM ANLYZR: CPT | Performed by: PHYSICIAN ASSISTANT

## 2018-06-08 PROCEDURE — 96360 HYDRATION IV INFUSION INIT: CPT

## 2018-06-08 PROCEDURE — 36415 COLL VENOUS BLD VENIPUNCTURE: CPT

## 2018-06-08 PROCEDURE — 85730 THROMBOPLASTIN TIME PARTIAL: CPT | Performed by: PHYSICIAN ASSISTANT

## 2018-06-08 PROCEDURE — 96361 HYDRATE IV INFUSION ADD-ON: CPT

## 2018-06-08 PROCEDURE — 85025 COMPLETE CBC W/AUTO DIFF WBC: CPT | Performed by: EMERGENCY MEDICINE

## 2018-06-08 RX ORDER — SODIUM CHLORIDE 0.9 % (FLUSH) 0.9 %
10 SYRINGE (ML) INJECTION AS NEEDED
Status: DISCONTINUED | OUTPATIENT
Start: 2018-06-08 | End: 2018-06-08 | Stop reason: HOSPADM

## 2018-06-08 RX ORDER — ASPIRIN 325 MG
325 TABLET ORAL ONCE
Status: DISCONTINUED | OUTPATIENT
Start: 2018-06-08 | End: 2018-06-08

## 2018-06-08 RX ORDER — SODIUM CHLORIDE 9 MG/ML
125 INJECTION, SOLUTION INTRAVENOUS CONTINUOUS
Status: DISCONTINUED | OUTPATIENT
Start: 2018-06-08 | End: 2018-06-08 | Stop reason: HOSPADM

## 2018-06-08 RX ORDER — DIAZEPAM 5 MG/1
5 TABLET ORAL ONCE
Status: COMPLETED | OUTPATIENT
Start: 2018-06-08 | End: 2018-06-08

## 2018-06-08 RX ADMIN — SODIUM CHLORIDE 125 ML/HR: 9 INJECTION, SOLUTION INTRAVENOUS at 18:32

## 2018-06-08 RX ADMIN — NITROGLYCERIN 1 INCH: 20 OINTMENT TOPICAL at 18:32

## 2018-06-08 RX ADMIN — DIAZEPAM 5 MG: 5 TABLET ORAL at 20:05

## 2018-06-08 NOTE — ED PROVIDER NOTES
Subjective   38-year-old male presents to the ED today due to chest pain.  He states it started abruptly about 4 PM while he was driving.  He states he had a pressure and tightness all across his chest.  He states it felt like somebody was giving him a bear hug.  He states he also had chills and became diaphoretic.  He felt dizzy and had some blurred vision.  He states the symptoms lasted for about an hour.  During that hour he had 4 nitroglycerin sprays as well as 324 mg of aspirin.  He states his pain has since resolved.  He denies any shortness of breath.  He denies any nausea or vomiting.  He denies any lower extremity edema.  He has no past history of coronary artery disease.  He states he has never had a stress test.        History provided by:  Patient  Chest Pain   Pain location:  L chest and R chest  Pain quality: pressure and tightness    Pain radiates to:  Does not radiate  Pain severity:  Severe  Onset quality:  Sudden  Duration:  1 hour  Timing:  Constant  Progression:  Resolved  Chronicity:  New  Context: at rest    Relieved by:  Nothing  Worsened by:  Nothing  Associated symptoms: diaphoresis    Associated symptoms: no abdominal pain, no altered mental status, no anorexia, no anxiety, no back pain, no claudication, no cough, no dizziness, no dysphagia, no fatigue, no fever, no headache, no heartburn, no lower extremity edema, no nausea, no near-syncope, no numbness, no orthopnea, no palpitations, no PND, no shortness of breath, no syncope, no vomiting and no weakness    Risk factors: hypertension, male sex, obesity and smoking    Risk factors: no aortic disease, no coronary artery disease, no diabetes mellitus and no high cholesterol        Review of Systems   Constitutional: Positive for chills and diaphoresis. Negative for fatigue and fever.   HENT: Negative for trouble swallowing.    Eyes: Negative.    Respiratory: Positive for chest tightness. Negative for cough, shortness of breath and wheezing.     Cardiovascular: Positive for chest pain. Negative for palpitations, orthopnea, claudication, leg swelling, syncope, PND and near-syncope.   Gastrointestinal: Negative for abdominal pain, anorexia, heartburn, nausea and vomiting.   Genitourinary: Negative.    Musculoskeletal: Negative for back pain.   Skin: Negative.    Neurological: Negative for dizziness, weakness, numbness and headaches.   Psychiatric/Behavioral: Negative.    All other systems reviewed and are negative.      Past Medical History:   Diagnosis Date   • Anger    • Anxiety    • Arthritis    • Chronic back pain    • Depression    • Fatigue    • History of heart murmur in childhood    • Hyperlipidemia    • Hypertension    • Hypothyroidism    • Obesity    • Positive urine drug screen 03/2016   • Screening PSA (prostate specific antigen) 2014       No Known Allergies    Past Surgical History:   Procedure Laterality Date   • ABDOMINAL SURGERY     • CHOLECYSTECTOMY     • CHOLECYSTECTOMY WITH INTRAOPERATIVE CHOLANGIOGRAM N/A 9/5/2017    Procedure: CHOLECYSTECTOMY LAPAROSCOPIC INTRAOPERATIVE CHOLANGIOGRAM;  Surgeon: Joni Dawn MD;  Location: [facility];  Service:    • COLONOSCOPY N/A 1/4/2018    Procedure: COLONOSCOPY CPT CODE: 93909;  Surgeon: Remington Burroughs III, MD;  Location: [facility];  Service:    • GASTRIC BANDING  08/21/2016   • WISDOM TOOTH EXTRACTION         Family History   Problem Relation Age of Onset   • Cancer Mother         breast   • Lymphoma Father    • Cancer Sister         lung        Social History     Social History   • Marital status:      Occupational History   •       Social History Main Topics   • Smoking status: Light Tobacco Smoker   • Smokeless tobacco: Current User     Types: Snuff   • Alcohol use 18.0 oz/week     30 Cans of beer per week      Comment: Drinks a 30 pack of beer and one bottle of whiskey  per week.   • Drug use: No   • Sexual activity: Defer     Other Topics Concern   • Not on  file           Objective   Physical Exam   Constitutional: He is oriented to person, place, and time. He appears well-developed and well-nourished. No distress.   HENT:   Head: Normocephalic and atraumatic.   Right Ear: External ear normal.   Left Ear: External ear normal.   Nose: Nose normal.   Mouth/Throat: Oropharynx is clear and moist.   Eyes: Conjunctivae and EOM are normal. Pupils are equal, round, and reactive to light.   Neck: Normal range of motion. Neck supple.   Cardiovascular: Normal rate, regular rhythm, normal heart sounds and intact distal pulses.    Pulmonary/Chest: Effort normal and breath sounds normal. No respiratory distress. He has no wheezes. He exhibits no tenderness.   Abdominal: Soft. Bowel sounds are normal. There is no tenderness.   Musculoskeletal: Normal range of motion. He exhibits no edema.   Neurological: He is alert and oriented to person, place, and time.   Skin: Skin is warm and dry. Capillary refill takes less than 2 seconds.   Psychiatric: He has a normal mood and affect. His behavior is normal. Judgment and thought content normal.   Nursing note and vitals reviewed.      Procedures           ED Course  ED Course as of Jul 14 0051 Fri Jun 08, 2018   1828 17:26 - sinus rhythm, rate of 87, no acute ischemia, reviewed by Dr. Hyman ECG 12 Lead []   1915 No acute findings on CXR per Dr. Valdez  []   1926 Patient is currently chest pain free.  He denies any complaints at this time.  He is very eager to go home.  Will get second troponin when due.  If negative, he will be discharged home and will order outpatient stress test.  Patient is agreeable with this plan.  [AH]   1953 Patient is requesting medication for anxiety, he takes Valium 5 mg at home.  []   2010 Endorsed to Jorje Champion NP  [AH]   2049 The patient is not willing to stay for the draw of his second troponin.  The patient understands the risks of leaving AGAINST MEDICAL ADVICE.  The patient says that he will still  follow up and have his stress test performed.  Risk of death explained to patient and patient verbalized understanding.  [KK]      ED Course User Index  [AH] HARRY Chacko  [KK] ALONDRA Rangel      Results for orders placed or performed during the hospital encounter of 06/08/18   Comprehensive Metabolic Panel   Result Value Ref Range    Glucose 79 70 - 110 mg/dL    BUN 9 7 - 21 mg/dL    Creatinine 0.87 0.43 - 1.29 mg/dL    Sodium 137 135 - 153 mmol/L    Potassium 3.6 3.5 - 5.3 mmol/L    Chloride 108 99 - 112 mmol/L    CO2 27.5 24.3 - 31.9 mmol/L    Calcium 9.1 7.7 - 10.0 mg/dL    Total Protein 7.4 6.0 - 8.0 g/dL    Albumin 4.40 3.50 - 5.00 g/dL    ALT (SGPT) 19 10 - 44 U/L    AST (SGOT) 21 10 - 34 U/L    Alkaline Phosphatase 65 40 - 129 U/L    Total Bilirubin 0.9 0.2 - 1.8 mg/dL    eGFR Non African Amer 97 >60 mL/min/1.73    Globulin 3.0 gm/dL    A/G Ratio 1.5 1.5 - 2.5 g/dL    BUN/Creatinine Ratio 10.3 7.0 - 25.0    Anion Gap 1.5 (L) 3.6 - 11.2 mmol/L   Troponin   Result Value Ref Range    Troponin I <0.006 <=0.040 ng/mL   CBC Auto Differential   Result Value Ref Range    WBC 11.72 4.50 - 12.50 10*3/mm3    RBC 5.22 4.70 - 6.10 10*6/mm3    Hemoglobin 15.6 14.0 - 18.0 g/dL    Hematocrit 46.1 42.0 - 52.0 %    MCV 88.3 80.0 - 94.0 fL    MCH 29.9 27.0 - 33.0 pg    MCHC 33.8 33.0 - 37.0 g/dL    RDW 14.2 11.5 - 14.5 %    RDW-SD 45.6 37.0 - 54.0 fl    MPV 10.0 6.0 - 10.0 fL    Platelets 254 130 - 400 10*3/mm3    Neutrophil % 66.0 30.0 - 70.0 %    Lymphocyte % 22.8 21.0 - 51.0 %    Monocyte % 9.4 0.0 - 10.0 %    Eosinophil % 1.1 0.0 - 5.0 %    Basophil % 0.3 0.0 - 2.0 %    Immature Grans % 0.4 0.0 - 0.5 %    Neutrophils, Absolute 7.74 (H) 1.40 - 6.50 10*3/mm3    Lymphocytes, Absolute 2.67 1.00 - 3.00 10*3/mm3    Monocytes, Absolute 1.10 (H) 0.10 - 0.90 10*3/mm3    Eosinophils, Absolute 0.13 0.00 - 0.70 10*3/mm3    Basophils, Absolute 0.03 0.00 - 0.30 10*3/mm3    Immature Grans, Absolute 0.05 (H) 0.00 - 0.03  10*3/mm3   Protime-INR   Result Value Ref Range    Protime 13.8 11.0 - 15.4 Seconds    INR 1.04 0.90 - 1.10   aPTT   Result Value Ref Range    PTT 30.1 23.8 - 36.1 seconds   Lipase   Result Value Ref Range    Lipase 27 13 - 60 U/L   Amylase   Result Value Ref Range    Amylase 53 28 - 100 U/L   BNP   Result Value Ref Range    BNP <2.0 0.0 - 100.0 pg/mL   D-dimer, Quantitative   Result Value Ref Range    D-Dimer, Quantitative <0.27 0.00 - 0.50 MCGFEU/mL   Osmolality, Calculated   Result Value Ref Range    Osmolality Calc 271.4 (L) 273.0 - 305.0 mOsm/kg   Ethanol   Result Value Ref Range    Ethanol <10 <=10 mg/dL    Ethanol % <0.010 %   Troponin   Result Value Ref Range    Troponin I <0.006 <=0.040 ng/mL   Light Blue Top   Result Value Ref Range    Extra Tube hold for add-on    Green Top (Gel)   Result Value Ref Range    Extra Tube Hold for add-ons.    Lavender Top   Result Value Ref Range    Extra Tube hold for add-on    Gold Top - SST   Result Value Ref Range    Extra Tube Hold for add-ons.              HEART Score (for prediction of 6-week risk of major adverse cardiac event) reviewed and/or performed as part of the patient evaluation and treatment planning process.  The result associated with this review/performance is: 3       MDM  Number of Diagnoses or Management Options  Chest pain, unspecified type: new and requires workup     Amount and/or Complexity of Data Reviewed  Clinical lab tests: reviewed and ordered  Tests in the radiology section of CPT®: reviewed and ordered  Tests in the medicine section of CPT®: reviewed and ordered    Risk of Complications, Morbidity, and/or Mortality  Presenting problems: moderate  Diagnostic procedures: moderate  Management options: moderate    Patient Progress  Patient progress: improved        Final diagnoses:   Chest pain, unspecified type            Jorje Champion, APRN  06/08/18 2040       Jorje Champion, APRN  07/14/18 0051

## 2018-06-09 NOTE — ED NOTES
PT STATES AT THIS TIME THAT HE IS OK AND WOULD LIKE TO LEAVE PT STATES THAT HE HAS NOT HAD CP SINCE HE HAS BEEN HERE PT STATES THAT HE WILL HAVE STRESS TEST AND FOLLOW UP AS NEEDED BUT HE IS TIRED AND JUST WANTS TO LEAVE OPT STATES THAT HE WILL HAVE BLOOD DRAWN BUT WANTS TO LEAVE AFTER BLOOD IS TAKEN EXPLAINED TO PT THAT IF HE CHOOSES TO LEAVE THEN HE WILL BE LEAVING AMA THIS NURSE EXPLAINED TO PT AND HIS WIFE WHAT AMA MEANS PT AND SPOUSE STATES UNDERSTANDING  KADI Vanegas, SLIM  06/08/18 2053

## 2018-06-12 ENCOUNTER — TRANSCRIBE ORDERS (OUTPATIENT)
Dept: ADMINISTRATIVE | Facility: HOSPITAL | Age: 38
End: 2018-06-12

## 2018-06-12 DIAGNOSIS — R07.9 CHEST PAIN, UNSPECIFIED TYPE: Primary | ICD-10-CM

## 2018-06-21 RX ORDER — LAMOTRIGINE 100 MG/1
TABLET ORAL
Qty: 30 TABLET | Refills: 2 | OUTPATIENT
Start: 2018-06-21

## 2018-06-27 RX ORDER — LORATADINE 10 MG/1
TABLET ORAL
Qty: 30 TABLET | Refills: 5 | Status: SHIPPED | OUTPATIENT
Start: 2018-06-27 | End: 2019-05-06 | Stop reason: SDUPTHER

## 2018-07-09 RX ORDER — LEVOTHYROXINE SODIUM 175 UG/1
175 TABLET ORAL DAILY
Qty: 30 TABLET | Refills: 1 | Status: SHIPPED | OUTPATIENT
Start: 2018-07-09 | End: 2018-10-03 | Stop reason: SDUPTHER

## 2018-07-09 RX ORDER — ESCITALOPRAM OXALATE 20 MG/1
20 TABLET ORAL DAILY
Qty: 30 TABLET | Refills: 0 | Status: SHIPPED | OUTPATIENT
Start: 2018-07-09 | End: 2018-11-05

## 2018-09-21 RX ORDER — LISINOPRIL 10 MG/1
10 TABLET ORAL DAILY
Qty: 30 TABLET | Refills: 0 | Status: SHIPPED | OUTPATIENT
Start: 2018-09-21 | End: 2018-10-30 | Stop reason: SDUPTHER

## 2018-10-03 RX ORDER — LEVOTHYROXINE SODIUM 175 UG/1
175 TABLET ORAL DAILY
Qty: 30 TABLET | Refills: 1 | Status: SHIPPED | OUTPATIENT
Start: 2018-10-03 | End: 2018-11-05 | Stop reason: SDUPTHER

## 2018-10-30 RX ORDER — LISINOPRIL 10 MG/1
10 TABLET ORAL DAILY
Qty: 30 TABLET | Refills: 0 | OUTPATIENT
Start: 2018-10-30

## 2018-10-30 RX ORDER — LISINOPRIL 10 MG/1
10 TABLET ORAL DAILY
Qty: 30 TABLET | Refills: 0 | Status: SHIPPED | OUTPATIENT
Start: 2018-10-30 | End: 2018-11-05 | Stop reason: SDUPTHER

## 2018-10-30 RX ORDER — LISINOPRIL 10 MG/1
10 TABLET ORAL DAILY
Qty: 30 TABLET | Refills: 0 | Status: SHIPPED | OUTPATIENT
Start: 2018-10-30 | End: 2018-10-30 | Stop reason: SDUPTHER

## 2018-10-31 DIAGNOSIS — E29.1 HYPOGONADISM IN MALE: ICD-10-CM

## 2018-11-01 RX ORDER — TESTOSTERONE CYPIONATE 200 MG/ML
INJECTION, SOLUTION INTRAMUSCULAR
Qty: 10 ML | Refills: 2 | OUTPATIENT
Start: 2018-11-01

## 2018-11-05 ENCOUNTER — OFFICE VISIT (OUTPATIENT)
Dept: FAMILY MEDICINE CLINIC | Facility: CLINIC | Age: 38
End: 2018-11-05

## 2018-11-05 VITALS
BODY MASS INDEX: 43.93 KG/M2 | DIASTOLIC BLOOD PRESSURE: 86 MMHG | OXYGEN SATURATION: 98 % | HEIGHT: 69 IN | SYSTOLIC BLOOD PRESSURE: 143 MMHG | TEMPERATURE: 98.6 F | HEART RATE: 83 BPM | WEIGHT: 296.6 LBS

## 2018-11-05 DIAGNOSIS — H60.502 ACUTE OTITIS EXTERNA OF LEFT EAR, UNSPECIFIED TYPE: ICD-10-CM

## 2018-11-05 DIAGNOSIS — E07.9 DISEASE OF THYROID GLAND: ICD-10-CM

## 2018-11-05 DIAGNOSIS — I10 BENIGN ESSENTIAL HTN: Primary | ICD-10-CM

## 2018-11-05 PROBLEM — R19.4 CHANGE IN BOWEL HABITS: Status: RESOLVED | Noted: 2017-12-12 | Resolved: 2018-11-05

## 2018-11-05 LAB
ALBUMIN SERPL-MCNC: 4.5 G/DL (ref 3.5–5)
ALBUMIN/GLOB SERPL: 1.4 G/DL (ref 1.5–2.5)
ALP SERPL-CCNC: 65 U/L (ref 40–129)
ALT SERPL W P-5'-P-CCNC: 19 U/L (ref 10–44)
ANION GAP SERPL CALCULATED.3IONS-SCNC: 2.3 MMOL/L (ref 3.6–11.2)
AST SERPL-CCNC: 22 U/L (ref 10–34)
BASOPHILS # BLD AUTO: 0.04 10*3/MM3 (ref 0–0.3)
BASOPHILS NFR BLD AUTO: 0.5 % (ref 0–2)
BILIRUB SERPL-MCNC: 0.9 MG/DL (ref 0.2–1.8)
BUN BLD-MCNC: 12 MG/DL (ref 7–21)
BUN/CREAT SERPL: 13.2 (ref 7–25)
CALCIUM SPEC-SCNC: 9.5 MG/DL (ref 7.7–10)
CHLORIDE SERPL-SCNC: 106 MMOL/L (ref 99–112)
CHOLEST SERPL-MCNC: 173 MG/DL (ref 0–200)
CO2 SERPL-SCNC: 30.7 MMOL/L (ref 24.3–31.9)
CREAT BLD-MCNC: 0.91 MG/DL (ref 0.43–1.29)
DEPRECATED RDW RBC AUTO: 46.7 FL (ref 37–54)
EOSINOPHIL # BLD AUTO: 0.22 10*3/MM3 (ref 0–0.7)
EOSINOPHIL NFR BLD AUTO: 2.6 % (ref 0–5)
ERYTHROCYTE [DISTWIDTH] IN BLOOD BY AUTOMATED COUNT: 14.5 % (ref 11.5–14.5)
GFR SERPL CREATININE-BSD FRML MDRD: 92 ML/MIN/1.73
GLOBULIN UR ELPH-MCNC: 3.3 GM/DL
GLUCOSE BLD-MCNC: 84 MG/DL (ref 70–110)
HCT VFR BLD AUTO: 51.7 % (ref 42–52)
HDLC SERPL-MCNC: 58 MG/DL (ref 60–100)
HGB BLD-MCNC: 17.4 G/DL (ref 14–18)
IMM GRANULOCYTES # BLD: 0.02 10*3/MM3 (ref 0–0.03)
IMM GRANULOCYTES NFR BLD: 0.2 % (ref 0–0.5)
LDLC SERPL CALC-MCNC: 92 MG/DL (ref 0–100)
LDLC/HDLC SERPL: 1.59 {RATIO}
LYMPHOCYTES # BLD AUTO: 2.14 10*3/MM3 (ref 1–3)
LYMPHOCYTES NFR BLD AUTO: 25.6 % (ref 21–51)
MCH RBC QN AUTO: 30.3 PG (ref 27–33)
MCHC RBC AUTO-ENTMCNC: 33.7 G/DL (ref 33–37)
MCV RBC AUTO: 90.1 FL (ref 80–94)
MONOCYTES # BLD AUTO: 0.71 10*3/MM3 (ref 0.1–0.9)
MONOCYTES NFR BLD AUTO: 8.5 % (ref 0–10)
NEUTROPHILS # BLD AUTO: 5.22 10*3/MM3 (ref 1.4–6.5)
NEUTROPHILS NFR BLD AUTO: 62.6 % (ref 30–70)
OSMOLALITY SERPL CALC.SUM OF ELEC: 276.5 MOSM/KG (ref 273–305)
PLATELET # BLD AUTO: 277 10*3/MM3 (ref 130–400)
PMV BLD AUTO: 10.2 FL (ref 6–10)
POTASSIUM BLD-SCNC: 4.6 MMOL/L (ref 3.5–5.3)
PROT SERPL-MCNC: 7.8 G/DL (ref 6–8)
RBC # BLD AUTO: 5.74 10*6/MM3 (ref 4.7–6.1)
SODIUM BLD-SCNC: 139 MMOL/L (ref 135–153)
TRIGL SERPL-MCNC: 113 MG/DL (ref 0–150)
TSH SERPL DL<=0.05 MIU/L-ACNC: 3.03 MIU/ML (ref 0.55–4.78)
VIT B12 BLD-MCNC: 405 PG/ML (ref 211–911)
VLDLC SERPL-MCNC: 22.6 MG/DL
WBC NRBC COR # BLD: 8.35 10*3/MM3 (ref 4.5–12.5)

## 2018-11-05 PROCEDURE — 80061 LIPID PANEL: CPT | Performed by: NURSE PRACTITIONER

## 2018-11-05 PROCEDURE — 85025 COMPLETE CBC W/AUTO DIFF WBC: CPT | Performed by: NURSE PRACTITIONER

## 2018-11-05 PROCEDURE — 80053 COMPREHEN METABOLIC PANEL: CPT | Performed by: NURSE PRACTITIONER

## 2018-11-05 PROCEDURE — 99214 OFFICE O/P EST MOD 30 MIN: CPT | Performed by: NURSE PRACTITIONER

## 2018-11-05 PROCEDURE — 84443 ASSAY THYROID STIM HORMONE: CPT | Performed by: NURSE PRACTITIONER

## 2018-11-05 PROCEDURE — 82607 VITAMIN B-12: CPT | Performed by: NURSE PRACTITIONER

## 2018-11-05 PROCEDURE — 36415 COLL VENOUS BLD VENIPUNCTURE: CPT | Performed by: NURSE PRACTITIONER

## 2018-11-05 RX ORDER — LISINOPRIL 10 MG/1
10 TABLET ORAL DAILY
Qty: 30 TABLET | Refills: 3 | Status: SHIPPED | OUTPATIENT
Start: 2018-11-05 | End: 2018-11-05

## 2018-11-05 RX ORDER — LEVOTHYROXINE SODIUM 175 UG/1
175 TABLET ORAL DAILY
Qty: 30 TABLET | Refills: 3 | Status: SHIPPED | OUTPATIENT
Start: 2018-11-05 | End: 2019-04-26 | Stop reason: SDUPTHER

## 2018-11-05 RX ORDER — NEOMYCIN SULFATE, POLYMYXIN B SULFATE, HYDROCORTISONE 3.5; 10000; 1 MG/ML; [USP'U]/ML; MG/ML
4 SOLUTION/ DROPS AURICULAR (OTIC) 4 TIMES DAILY
Qty: 10 ML | Refills: 0 | Status: SHIPPED | OUTPATIENT
Start: 2018-11-05

## 2018-11-05 RX ORDER — LISINOPRIL 20 MG/1
20 TABLET ORAL DAILY
Qty: 30 TABLET | Refills: 5 | Status: SHIPPED | OUTPATIENT
Start: 2018-11-05 | End: 2019-05-06 | Stop reason: SDUPTHER

## 2018-11-05 RX ORDER — CIPROFLOXACIN AND DEXAMETHASONE 3; 1 MG/ML; MG/ML
4 SUSPENSION/ DROPS AURICULAR (OTIC) 2 TIMES DAILY
Qty: 7.5 ML | Refills: 0 | Status: SHIPPED | OUTPATIENT
Start: 2018-11-05 | End: 2018-11-05 | Stop reason: ALTCHOICE

## 2018-11-05 RX ORDER — VENLAFAXINE HYDROCHLORIDE 37.5 MG/1
37.5 CAPSULE, EXTENDED RELEASE ORAL DAILY
Refills: 1 | COMMUNITY
Start: 2018-10-01 | End: 2019-10-21

## 2018-11-05 NOTE — PROGRESS NOTES
Subjective   Brady Mcknight is a 38 y.o. male.   Chief Compliant: The patient presents with Earache (left) and Hypertension (med refill)    Thyroid Problem   Presents for follow-up (Follow up today for labs.  ) visit. Symptoms include anxiety (following with psych ) and fatigue. Patient reports no diaphoresis, diarrhea or palpitations. (Denies any symptoms  ) The symptoms have been stable.   Hypertension   This is a chronic problem. The problem has been gradually improving (In the past year able ot stay off HTN med until now flucuating over the past couple of weeks ) since onset. Associated symptoms include anxiety. Pertinent negatives include no blurred vision, chest pain, headaches, malaise/fatigue, neck pain, orthopnea, palpitations, peripheral edema, PND, shortness of breath or sweats. Risk factors for coronary artery disease include family history, obesity, sedentary lifestyle and stress. Past treatments include ACE inhibitors. Current antihypertension treatment includes nothing. Compliance problems include exercise (lap banding).  Identifiable causes of hypertension include a thyroid problem.   Earache    There is pain in the left ear. This is a new problem. The current episode started in the past 7 days. The problem occurs constantly (worse at night). There has been no fever. The pain is mild. Pertinent negatives include no abdominal pain, coughing, diarrhea, ear discharge, headaches, hearing loss, neck pain, rash, rhinorrhea, sore throat or vomiting. He has tried nothing for the symptoms.   Anxiety   Presents for follow-up (following with psych.  Recent medication changes.  Feels he is doing some better.  Still difficulty crowds ) visit. Symptoms include nervous/anxious behavior (following with psych ). Patient reports no chest pain, palpitations or shortness of breath. Symptoms occur most days. The severity of symptoms is mild. The quality of sleep is fair. Nighttime awakenings: occasional.          The  "following portions of the patient's history were reviewed and updated as appropriate: allergies, current medications, past family history, past medical history, past social history, past surgical history and problem list.      Review of Systems   Constitutional: Positive for fatigue. Negative for activity change, appetite change, chills, diaphoresis and malaise/fatigue.   HENT: Positive for ear pain. Negative for congestion, ear discharge, hearing loss, rhinorrhea and sore throat.    Eyes: Negative for blurred vision.   Respiratory: Negative.  Negative for cough and shortness of breath.    Cardiovascular: Negative.  Negative for chest pain, palpitations, orthopnea and PND.   Gastrointestinal: Negative for abdominal pain, diarrhea and vomiting.   Musculoskeletal: Negative for neck pain.   Skin: Negative for rash.   Neurological: Negative for headaches.   Psychiatric/Behavioral: Agitation: continues to follow with psych  The patient is nervous/anxious (following with psych ).    All other systems reviewed and are negative.      Procedures    Vitals: Blood pressure 143/86, pulse 83, temperature 98.6 °F (37 °C), height 175.3 cm (69\"), weight 135 kg (296 lb 9.6 oz), SpO2 98 %.     Allergies: No Known Allergies       Objective   Physical Exam   Constitutional: He is oriented to person, place, and time. He appears well-developed and well-nourished. No distress.   HENT:   Head: Normocephalic.   Right Ear: Hearing, tympanic membrane, external ear and ear canal normal.   Left Ear: Hearing and external ear normal. There is swelling and tenderness.   Nose: Nose normal. Right sinus exhibits no maxillary sinus tenderness and no frontal sinus tenderness. Left sinus exhibits no maxillary sinus tenderness and no frontal sinus tenderness.   Mouth/Throat: Oropharynx is clear and moist and mucous membranes are normal. No tonsillar exudate.   Neck: Neck supple.   Cardiovascular: Normal rate, regular rhythm and normal heart sounds.    No " murmur heard.  Pulmonary/Chest: Effort normal and breath sounds normal. No respiratory distress.   Neurological: He is alert and oriented to person, place, and time.   Skin: Skin is warm and dry. He is not diaphoretic.   Psychiatric: He has a normal mood and affect. His behavior is normal.   Nursing note and vitals reviewed.      During this visit the following were done:  Labs Reviewed []    Labs Ordered [x]    Radiology Reports Reviewed []    Radiology Ordered []    PCP Records Reviewed []    Referring Provider Records Reviewed []    ER Records Reviewed []    Hospital Records Reviewed []    History Obtained From Family []    Radiology Images Reviewed []    Other Reviewed []    Records Requested []      Assessment/Plan   Discussed with patient impression and plan, patient verbalizes understanding  Continue with routine medications  Will follow up if symptoms persist or worsen in any way   Continue with psych      Brady was seen today for earache and hypertension.    Diagnoses and all orders for this visit:    Benign essential HTN  -     CBC & Differential  -     Comprehensive Metabolic Panel  -     Lipid Panel  -     Vitamin B12  -     TSH  -     CBC Auto Differential    Disease of thyroid gland  -     TSH    Acute otitis externa of left ear, unspecified type    Other orders  -     Discontinue: lisinopril (PRINIVIL,ZESTRIL) 10 MG tablet; Take 1 tablet by mouth Daily.  -     levothyroxine (SYNTHROID, LEVOTHROID) 175 MCG tablet; Take 1 tablet by mouth Daily.  -     lisinopril (PRINIVIL,ZESTRIL) 20 MG tablet; Take 1 tablet by mouth Daily.  -     Discontinue: ciprofloxacin-dexamethasone (CIPRODEX) 0.3-0.1 % otic suspension; Administer 4 drops into the left ear 2 (Two) Times a Day.  -     neomycin-polymyxin-hydrocortisone (CORTISPORIN) 1 % solution otic solution; Administer 4 drops into the left ear 4 (Four) Times a Day.

## 2018-11-13 ENCOUNTER — OFFICE VISIT (OUTPATIENT)
Dept: UROLOGY | Facility: CLINIC | Age: 38
End: 2018-11-13

## 2018-11-13 VITALS — HEIGHT: 69 IN | BODY MASS INDEX: 43.84 KG/M2 | WEIGHT: 296 LBS

## 2018-11-13 DIAGNOSIS — E29.1 HYPOGONADISM IN MALE: ICD-10-CM

## 2018-11-13 LAB
BASOPHILS # BLD AUTO: 0.03 10*3/MM3 (ref 0–0.3)
BASOPHILS NFR BLD AUTO: 0.3 % (ref 0–2)
DEPRECATED RDW RBC AUTO: 45.8 FL (ref 37–54)
EOSINOPHIL # BLD AUTO: 0.21 10*3/MM3 (ref 0–0.7)
EOSINOPHIL NFR BLD AUTO: 2.1 % (ref 0–5)
ERYTHROCYTE [DISTWIDTH] IN BLOOD BY AUTOMATED COUNT: 14 % (ref 11.5–14.5)
ESTRADIOL SERPL HS-MCNC: 92.8 PG/ML
HCT VFR BLD AUTO: 50.4 % (ref 42–52)
HGB BLD-MCNC: 16.8 G/DL (ref 14–18)
IMM GRANULOCYTES # BLD: 0.03 10*3/MM3 (ref 0–0.03)
IMM GRANULOCYTES NFR BLD: 0.3 % (ref 0–0.5)
LYMPHOCYTES # BLD AUTO: 2.26 10*3/MM3 (ref 1–3)
LYMPHOCYTES NFR BLD AUTO: 23.1 % (ref 21–51)
MCH RBC QN AUTO: 30.2 PG (ref 27–33)
MCHC RBC AUTO-ENTMCNC: 33.3 G/DL (ref 33–37)
MCV RBC AUTO: 90.5 FL (ref 80–94)
MONOCYTES # BLD AUTO: 0.91 10*3/MM3 (ref 0.1–0.9)
MONOCYTES NFR BLD AUTO: 9.3 % (ref 0–10)
NEUTROPHILS # BLD AUTO: 6.33 10*3/MM3 (ref 1.4–6.5)
NEUTROPHILS NFR BLD AUTO: 64.9 % (ref 30–70)
PLATELET # BLD AUTO: 285 10*3/MM3 (ref 130–400)
PMV BLD AUTO: 10 FL (ref 6–10)
PSA SERPL-MCNC: 0.43 NG/ML (ref 0–4)
RBC # BLD AUTO: 5.57 10*6/MM3 (ref 4.7–6.1)
TESTOST SERPL-MCNC: 1368.89 NG/DL (ref 123.06–813.86)
WBC NRBC COR # BLD: 9.77 10*3/MM3 (ref 4.5–12.5)

## 2018-11-13 PROCEDURE — 85025 COMPLETE CBC W/AUTO DIFF WBC: CPT | Performed by: UROLOGY

## 2018-11-13 PROCEDURE — 84153 ASSAY OF PSA TOTAL: CPT | Performed by: UROLOGY

## 2018-11-13 PROCEDURE — 82670 ASSAY OF TOTAL ESTRADIOL: CPT | Performed by: UROLOGY

## 2018-11-13 PROCEDURE — 99214 OFFICE O/P EST MOD 30 MIN: CPT | Performed by: UROLOGY

## 2018-11-13 PROCEDURE — 84403 ASSAY OF TOTAL TESTOSTERONE: CPT | Performed by: UROLOGY

## 2018-11-13 RX ORDER — TESTOSTERONE CYPIONATE 200 MG/ML
INJECTION, SOLUTION INTRAMUSCULAR
Qty: 10 ML | Refills: 2 | Status: SHIPPED | OUTPATIENT
Start: 2018-11-13 | End: 2019-05-24 | Stop reason: SDUPTHER

## 2018-11-13 NOTE — PROGRESS NOTES
"Chief Complaint:          Chief Complaint   Patient presents with   • Hypogonadism       HPI:   38 y.o. male.  38-year-old white male status post vasectomy doing well without complications repeat sperm count is negative.Patient returns today for follow-up.  He is been on testosterone replacement therapy.  He reports a dramatic improvement in his Nazario questionnaire: -NAZARIO-androgen deficiency in the age male questionnaire  The patient was queried regarding the androgen deficiency in the age male questionnaire.  This is a validated questionnaire that was performed on a set of 314 [place] male physicians when it was positive it correlated directly with a 94% chance of low testosterone.  Patient indicates there is a decrease in libido or sex drive, a lack of energy, Decreased  strength and endurance, a decreased \"enjoyment of life\", sad and grumpy feelings with significant difficulty maintaining erections.  He is also been a recent deterioration regarding work performance.  He reports weight loss.  He has good facility and the use of subcutaneous and intramuscular injections as well as comfort level and using the medication in a sterile fashion.  He understands he should use only the prescribed dose.  He's here for appropriate lab monitoring regarding this.  He understands this is a controlled substance and therefore must be watched closely will not be refilled and the medical loss or miss calculation of the dose.  He is very happy with the treatment and therefore wants to continue it.  He returns today.  He's done fantastic he's lost 115 pounds he's gone from 420 pounds to 287 pounds on his way to was goal of 225 pounds his waist is now down to 38 inches.  His only residuals that of some anxiety and depression.  For which he takes Effexor which does have some component of ejaculatory effects.  I'm going to make the addition of an estradiol level to see if he did not doesn't have a problem with aromatase inhibition " "overall he has done well       Past Medical History:        Past Medical History:   Diagnosis Date   • Anger    • Anxiety    • Arthritis    • Chronic back pain    • Depression    • Fatigue    • History of heart murmur in childhood    • Hyperlipidemia    • Hypertension    • Hypothyroidism    • Obesity    • Positive urine drug screen 03/2016   • Screening PSA (prostate specific antigen) 2014         Current Meds:     Current Outpatient Medications   Medication Sig Dispense Refill   • B-D 3CC LUER-ALBIN SYR 21GX1\" 21G X 1\" 3 ML misc   0   • BD DISP NEEDLES 20G X 1-1/2\" misc   0   • diazePAM (VALIUM) 5 MG tablet Take 1 tablet by mouth Daily As Needed for Anxiety. 15 tablet 0   • HYDROcodone-acetaminophen (NORCO) 5-325 MG per tablet 1 to 2 Tablets Every 6 Hours as Needed for PAIN 12 tablet 0   • levothyroxine (SYNTHROID, LEVOTHROID) 175 MCG tablet Take 1 tablet by mouth Daily. 30 tablet 3   • lisinopril (PRINIVIL,ZESTRIL) 20 MG tablet Take 1 tablet by mouth Daily. 30 tablet 5   • loratadine (CLARITIN) 10 MG tablet TAKE 1 TABLET BY MOUTH DAILY FOR ALLERGIES 30 tablet 5   • neomycin-polymyxin-hydrocortisone (CORTISPORIN) 1 % solution otic solution Administer 4 drops into the left ear 4 (Four) Times a Day. 10 mL 0   • Syringe, Disposable, 3 ML misc Use 3 ml syringe with a 25 gauge  5/8 inch needle 24 each 6   • Testosterone Cypionate (DEPO-TESTOSTERONE) 200 MG/ML injection He is to use 1/2 cc every Monday and Thursday SQ 10 mL 2   • Testosterone Cypionate (DEPOTESTOTERONE CYPIONATE) 200 MG/ML injection   0   • venlafaxine XR (EFFEXOR-XR) 37.5 MG 24 hr capsule Take 37.5 mg by mouth Daily.  1     No current facility-administered medications for this visit.         Allergies:      No Known Allergies     Past Surgical History:     Past Surgical History:   Procedure Laterality Date   • ABDOMINAL SURGERY     • CHOLECYSTECTOMY     • GASTRIC BANDING  08/21/2016   • VASECTOMY     • WISDOM TOOTH EXTRACTION           Social History: "     Social History     Socioeconomic History   • Marital status:      Spouse name: Not on file   • Number of children: Not on file   • Years of education: Not on file   • Highest education level: Not on file   Social Needs   • Financial resource strain: Not on file   • Food insecurity - worry: Not on file   • Food insecurity - inability: Not on file   • Transportation needs - medical: Not on file   • Transportation needs - non-medical: Not on file   Occupational History   • Occupation:    Tobacco Use   • Smoking status: Light Tobacco Smoker   • Smokeless tobacco: Current User     Types: Snuff   Substance and Sexual Activity   • Alcohol use: Yes     Alcohol/week: 18.0 oz     Types: 30 Cans of beer per week     Comment: Drinks a 30 pack of beer and one bottle of whiskey  per week.   • Drug use: No   • Sexual activity: Defer   Other Topics Concern   • Not on file   Social History Narrative   • Not on file       Family History:     Family History   Problem Relation Age of Onset   • Cancer Mother         breast   • Lymphoma Father    • Cancer Sister         lung        Review of Systems:     Review of Systems   Constitutional: Negative.    HENT: Negative.    Eyes: Negative.    Respiratory: Negative.    Cardiovascular: Negative.    Gastrointestinal: Negative.    Endocrine: Negative.    Musculoskeletal: Negative.    Allergic/Immunologic: Negative.    Neurological: Negative.    Hematological: Negative.    Psychiatric/Behavioral: Negative.        Physical Exam:     Physical Exam   Constitutional: He is oriented to person, place, and time. He appears well-developed and well-nourished.   HENT:   Head: Normocephalic and atraumatic.   Eyes: Conjunctivae and EOM are normal. Pupils are equal, round, and reactive to light.   Neck: Normal range of motion.   Cardiovascular: Normal rate, regular rhythm, normal heart sounds and intact distal pulses.   Pulmonary/Chest: Effort normal and breath sounds normal.    Abdominal: Soft. Bowel sounds are normal.   Musculoskeletal: Normal range of motion.   Neurological: He is alert and oriented to person, place, and time. He has normal reflexes.   Skin: Skin is warm and dry.   Psychiatric: He has a normal mood and affect. His behavior is normal. Judgment and thought content normal.   Nursing note and vitals reviewed.      I have reviewed the following portions of the patient's history: allergies, current medications, past family history, past medical history, past social history, past surgical history, problem list and ROS and confirm it's accurate.      Procedure:       Assessment/Plan:   -Low testosterone: patient is here for follow-up.  Since beginning the medication he's been very pleased.  He reports a dramatic improvement in his erections, ability to achieve and maintain an erection, improvement in libido, increase in frequency of morning erections, a noticeable weight loss consistent with the treatment.  No development of breast problems or abnormalities.  He's going to have appropriate safety laboratory parameters checked.   He understands that the new data implicates testosterone with the development of prostate cancer and this is all but been disproven and the medical literature as well as the risks of cardiovascular disease which is actually also been disproven.  He understands that while he is a candidate for topical therapy if he is in contact with children this is not an option because it's been shown to accentuate genitalia development at an early age that this frequently irreversible.  He also understands this is a controlled substance and as such will not be prescribed without appropriate follow-up and appropriate laboratory investigation.  He understands effects on spermatogenesis including the fact that this is not always completely reversible and not always completely limited his ability to father a child.  He has demonstrated facility in the technique of both  intramuscular and subcutaneous injection.  And has been taught sterility one drawing up the medication.  He's done fantastic.  He has excellent erections and ejaculations.     Patient's Body mass index is 43.69 kg/m². BMI is above normal parameters. Recommendations include: educational material.          This document has been electronically signed by DIANE WELCH MD November 13, 2018 8:53 AM

## 2018-11-15 ENCOUNTER — TELEPHONE (OUTPATIENT)
Dept: UROLOGY | Facility: CLINIC | Age: 38
End: 2018-11-15

## 2018-11-15 DIAGNOSIS — E28.0 ESTROGEN EXCESS: Primary | ICD-10-CM

## 2018-11-15 RX ORDER — ANASTROZOLE 1 MG/1
1 TABLET ORAL WEEKLY
Qty: 12 TABLET | Refills: 0 | Status: SHIPPED | OUTPATIENT
Start: 2018-11-15 | End: 2019-05-06

## 2018-11-15 NOTE — TELEPHONE ENCOUNTER
Sr. Quinn asked to to call the patient and inform him his estradiol slightly high and that he would recommend arimidex 1 mg per week ×12 weeks. I called the patient and sent it over to his pharmacy.

## 2018-11-27 ENCOUNTER — TELEPHONE (OUTPATIENT)
Dept: UROLOGY | Facility: CLINIC | Age: 38
End: 2018-11-27

## 2019-04-25 RX ORDER — LEVOTHYROXINE SODIUM 175 UG/1
175 TABLET ORAL DAILY
Qty: 30 TABLET | Refills: 3 | OUTPATIENT
Start: 2019-04-25

## 2019-04-26 RX ORDER — LEVOTHYROXINE SODIUM 175 UG/1
175 TABLET ORAL DAILY
Qty: 30 TABLET | Refills: 0 | Status: SHIPPED | OUTPATIENT
Start: 2019-04-26 | End: 2019-05-06 | Stop reason: SDUPTHER

## 2019-05-06 ENCOUNTER — OFFICE VISIT (OUTPATIENT)
Dept: FAMILY MEDICINE CLINIC | Facility: CLINIC | Age: 39
End: 2019-05-06

## 2019-05-06 VITALS
HEART RATE: 91 BPM | HEIGHT: 69 IN | DIASTOLIC BLOOD PRESSURE: 78 MMHG | TEMPERATURE: 98.8 F | OXYGEN SATURATION: 98 % | SYSTOLIC BLOOD PRESSURE: 110 MMHG | BODY MASS INDEX: 44.55 KG/M2 | WEIGHT: 300.8 LBS

## 2019-05-06 DIAGNOSIS — F40.10 SOCIAL ANXIETY DISORDER: ICD-10-CM

## 2019-05-06 DIAGNOSIS — J30.1 SEASONAL ALLERGIC RHINITIS DUE TO POLLEN: ICD-10-CM

## 2019-05-06 DIAGNOSIS — E07.9 DISEASE OF THYROID GLAND: ICD-10-CM

## 2019-05-06 DIAGNOSIS — L72.3 SEBACEOUS CYST: ICD-10-CM

## 2019-05-06 DIAGNOSIS — I10 BENIGN ESSENTIAL HTN: Primary | ICD-10-CM

## 2019-05-06 PROBLEM — Z98.52 VASECTOMY STATUS: Status: RESOLVED | Noted: 2017-11-07 | Resolved: 2019-05-06

## 2019-05-06 PROCEDURE — 99214 OFFICE O/P EST MOD 30 MIN: CPT | Performed by: NURSE PRACTITIONER

## 2019-05-06 RX ORDER — LEVOTHYROXINE SODIUM 175 UG/1
175 TABLET ORAL DAILY
Qty: 30 TABLET | Refills: 5 | Status: SHIPPED | OUTPATIENT
Start: 2019-05-06 | End: 2019-10-21 | Stop reason: SDUPTHER

## 2019-05-06 RX ORDER — LORATADINE 10 MG/1
10 TABLET ORAL DAILY
Qty: 30 TABLET | Refills: 5 | Status: SHIPPED | OUTPATIENT
Start: 2019-05-06 | End: 2019-10-21 | Stop reason: SDUPTHER

## 2019-05-06 RX ORDER — LISINOPRIL 20 MG/1
20 TABLET ORAL DAILY
Qty: 30 TABLET | Refills: 5 | Status: SHIPPED | OUTPATIENT
Start: 2019-05-06 | End: 2019-10-21 | Stop reason: SDUPTHER

## 2019-05-06 RX ORDER — OLANZAPINE 5 MG/1
5 TABLET ORAL NIGHTLY
COMMUNITY
Start: 2019-04-27 | End: 2019-10-21

## 2019-05-06 RX ORDER — DIAZEPAM 5 MG/1
5 TABLET ORAL 2 TIMES DAILY
COMMUNITY
Start: 2019-04-27 | End: 2019-10-21

## 2019-05-06 NOTE — PROGRESS NOTES
Subjective   Brady Mcknight is a 38 y.o. male.   Chief Compliant: The patient presents with Cyst (back of neck x 1 year) and Allergies    Hypertension   This is a chronic problem. The problem is controlled. Associated symptoms include anxiety. Pertinent negatives include no blurred vision, chest pain, headaches, malaise/fatigue, neck pain, orthopnea, palpitations, peripheral edema, PND, shortness of breath or sweats. Risk factors for coronary artery disease include family history, obesity, sedentary lifestyle and stress. Past treatments include ACE inhibitors. Current antihypertension treatment includes nothing. Compliance problems include exercise and diet (lap banding).  Identifiable causes of hypertension include a thyroid problem.   Thyroid Problem   Presents for follow-up (Known Hypothyroidism) visit. Symptoms include anxiety (following with psych ). Patient reports no diaphoresis, fatigue or palpitations. (Denies any symptoms  ) The symptoms have been stable.   Anxiety   Presents for follow-up (following with psych.  Feels he continues to some better.  Still with difficulty with crowds ) visit. Symptoms include nervous/anxious behavior (following with psych ). Patient reports no chest pain, palpitations or shortness of breath. Symptoms occur most days. The severity of symptoms is mild. The quality of sleep is fair. Nighttime awakenings: occasional.       Cyst   This is a new problem. The current episode started more than 1 year ago. The problem occurs constantly. The problem has been unchanged. Associated symptoms include congestion. Pertinent negatives include no chest pain, chills, diaphoresis, fatigue, fever, headaches, neck pain or swollen glands. Associated symptoms comments: Noticed cyst.  No pain associated  . Nothing aggravates the symptoms. He has tried nothing for the symptoms.   Allergies   This is a new problem. The current episode started 1 to 4 weeks ago. The problem occurs daily. The problem has  "been waxing and waning. Associated symptoms include congestion. Pertinent negatives include no chest pain, chills, diaphoresis, fatigue, fever, headaches, neck pain or swollen glands. Associated symptoms comments: Runny nose and congestion  . Exacerbated by: Bike ride over 200 mile and noticed pollen in his beard when he returned home. Treatments tried: claritin. The treatment provided mild relief.      The following portions of the patient's history were reviewed and updated as appropriate: allergies, current medications, past family history, past medical history, past social history, past surgical history and problem list.      Review of Systems   Constitutional: Negative for activity change, appetite change, chills, diaphoresis, fatigue, fever and malaise/fatigue.   HENT: Positive for congestion.    Eyes: Negative for blurred vision.   Respiratory: Negative.  Negative for shortness of breath.    Cardiovascular: Negative.  Negative for chest pain, palpitations, orthopnea and PND.   Musculoskeletal: Negative.  Negative for neck pain.   Neurological: Negative for headaches.   Psychiatric/Behavioral: Agitation: continues to follow with psych  The patient is nervous/anxious (following with psych ).    All other systems reviewed and are negative.      Procedures    Vitals: Blood pressure 110/78, pulse 91, temperature 98.8 °F (37.1 °C), temperature source Oral, height 175.3 cm (69\"), weight (!) 136 kg (300 lb 12.8 oz), SpO2 98 %.     Allergies: No Known Allergies       Objective   Physical Exam   Constitutional: He is oriented to person, place, and time. He appears well-developed and well-nourished. No distress.   HENT:   Head: Normocephalic.   Right Ear: Hearing, tympanic membrane, external ear and ear canal normal.   Left Ear: Hearing and external ear normal. There is swelling and tenderness.   Nose: Nose normal. Right sinus exhibits no maxillary sinus tenderness and no frontal sinus tenderness. Left sinus exhibits no " maxillary sinus tenderness and no frontal sinus tenderness.   Mouth/Throat: Oropharynx is clear and moist and mucous membranes are normal. No oropharyngeal exudate. No tonsillar exudate.   Eyes: Conjunctivae are normal. Right eye exhibits no discharge. Left eye exhibits no discharge.   Neck: Neck supple.   Cardiovascular: Normal rate, regular rhythm and normal heart sounds.   No murmur heard.  Pulmonary/Chest: Effort normal and breath sounds normal. No respiratory distress.   Neurological: He is alert and oriented to person, place, and time.   Skin: Skin is warm and dry. He is not diaphoretic.   1 cm flesh colored cyst right posterior neck   No warmth, tenderness or induration      Psychiatric: He has a normal mood and affect. His behavior is normal. Judgment and thought content normal.   Nursing note and vitals reviewed.      During this visit the following were done:  Labs Reviewed []    Labs Ordered []    Radiology Reports Reviewed []    Radiology Ordered []    PCP Records Reviewed []    Referring Provider Records Reviewed []    ER Records Reviewed []    Hospital Records Reviewed []    History Obtained From Family []    Radiology Images Reviewed []    Other Reviewed []    Records Requested []      Assessment/Plan   Discussed with patient impression and plan, patient verbalizes understanding  Continue with routine medications  Will follow up if symptoms persist or worsen in any way   Continue with psych      Brady was seen today for cyst and allergies.    Diagnoses and all orders for this visit:    Benign essential HTN  -     lisinopril (PRINIVIL,ZESTRIL) 20 MG tablet; Take 1 tablet by mouth Daily.    Disease of thyroid gland  -     levothyroxine (SYNTHROID, LEVOTHROID) 175 MCG tablet; Take 1 tablet by mouth Daily.    Seasonal allergic rhinitis due to pollen  -     loratadine (CLARITIN) 10 MG tablet; Take 1 tablet by mouth Daily.    Sebaceous cyst  Discussed treatment options   Patient wished to defer surgical  consult for now      Social anxiety disorder  Continue with routine meds and with psych and counseling      Patient is a non smoker    Patient's Body mass index is 44.42 kg/m². BMI is above normal parameters. Recommendations include: exercise counseling and nutrition counseling.

## 2019-05-24 ENCOUNTER — OFFICE VISIT (OUTPATIENT)
Dept: UROLOGY | Facility: CLINIC | Age: 39
End: 2019-05-24

## 2019-05-24 VITALS — BODY MASS INDEX: 44.43 KG/M2 | HEIGHT: 69 IN | WEIGHT: 300 LBS

## 2019-05-24 DIAGNOSIS — N42.9 DISORDER OF PROSTATE: Primary | ICD-10-CM

## 2019-05-24 DIAGNOSIS — E29.1 HYPOGONADISM IN MALE: ICD-10-CM

## 2019-05-24 DIAGNOSIS — N53.19 OTHER EJACULATORY DYSFUNCTION: ICD-10-CM

## 2019-05-24 LAB
DEPRECATED RDW RBC AUTO: 45.7 FL (ref 37–54)
ERYTHROCYTE [DISTWIDTH] IN BLOOD BY AUTOMATED COUNT: 13.4 % (ref 12.3–15.4)
ESTRADIOL SERPL HS-MCNC: 80.9 PG/ML
HCT VFR BLD AUTO: 50.9 % (ref 37.5–51)
HGB BLD-MCNC: 16.3 G/DL (ref 13–17.7)
MCH RBC QN AUTO: 29.7 PG (ref 26.6–33)
MCHC RBC AUTO-ENTMCNC: 32 G/DL (ref 31.5–35.7)
MCV RBC AUTO: 92.9 FL (ref 79–97)
PLATELET # BLD AUTO: 323 10*3/MM3 (ref 140–450)
PMV BLD AUTO: 10 FL (ref 6–12)
PSA SERPL-MCNC: 0.52 NG/ML (ref 0–4)
RBC # BLD AUTO: 5.48 10*6/MM3 (ref 4.14–5.8)
TESTOST SERPL-MCNC: 937 NG/DL (ref 249–836)
WBC NRBC COR # BLD: 10.41 10*3/MM3 (ref 3.4–10.8)

## 2019-05-24 PROCEDURE — 84403 ASSAY OF TOTAL TESTOSTERONE: CPT | Performed by: UROLOGY

## 2019-05-24 PROCEDURE — 82670 ASSAY OF TOTAL ESTRADIOL: CPT | Performed by: UROLOGY

## 2019-05-24 PROCEDURE — 84153 ASSAY OF PSA TOTAL: CPT | Performed by: UROLOGY

## 2019-05-24 PROCEDURE — 99214 OFFICE O/P EST MOD 30 MIN: CPT | Performed by: UROLOGY

## 2019-05-24 PROCEDURE — 85027 COMPLETE CBC AUTOMATED: CPT | Performed by: UROLOGY

## 2019-05-24 RX ORDER — SILDENAFIL CITRATE 20 MG/1
20 TABLET ORAL DAILY
Qty: 24 TABLET | Refills: 6 | Status: SHIPPED | OUTPATIENT
Start: 2019-05-24 | End: 2019-07-11

## 2019-05-24 RX ORDER — TESTOSTERONE CYPIONATE 200 MG/ML
INJECTION, SOLUTION INTRAMUSCULAR
Qty: 10 ML | Refills: 2 | Status: SHIPPED | OUTPATIENT
Start: 2019-05-24 | End: 2020-02-25 | Stop reason: SDUPTHER

## 2019-05-24 NOTE — PROGRESS NOTES
"Chief Complaint:          Chief Complaint   Patient presents with   • Hypogonadism in male       HPI:   39 y.o. male  status post vasectomy doing well without complications repeat sperm count is negative.Patient returns today for follow-up.  He is been on testosterone replacement therapy.  He reports a dramatic improvement in his Nazario questionnaire: -NAZARIO-androgen deficiency in the age male questionnaire  The patient was queried regarding the androgen deficiency in the age male questionnaire.  This is a validated questionnaire that was performed on a set of 314 [place] male physicians when it was positive it correlated directly with a 94% chance of low testosterone.  Patient indicates there is a decrease in libido or sex drive, a lack of energy, Decreased  strength and endurance, a decreased \"enjoyment of life\", sad and grumpy feelings with significant difficulty maintaining erections.  He is also been a recent deterioration regarding work performance.  He reports weight loss.  He has good facility and the use of subcutaneous and intramuscular injections as well as comfort level and using the medication in a sterile fashion.  He understands he should use only the prescribed dose.  He's here for appropriate lab monitoring regarding this.  He understands this is a controlled substance and therefore must be watched closely will not be refilled and the medical loss or miss calculation of the dose.  He is very happy with the treatment and therefore wants to continue it.  He returns today.  He's done fantastic he's lost 115 pounds he's gone from 420 pounds to 287 pounds on his way to was goal of 225 pounds his waist is now down to 38 inches.  His only residuals that of some anxiety and depression.  For which he takes Effexor which does have some component of ejaculatory effects.  I'm going to make the addition of an estradiol level to see if he did not doesn't have a problem with aromatase inhibition overall he has done " "well.  He returns today he continues to lose weight his major complaint is that he has a decreased ejaculate volume and is quite thick somewhat of a problem he is also on Zyprexa.  I am going to recheck an estradiol level.  We discussed about increasing fluids in order to generate an appropriate ejaculate volume and I will follow-up with him based on this      Past Medical History:        Past Medical History:   Diagnosis Date   • Anger    • Anxiety    • Arthritis    • Chronic back pain    • Depression    • Fatigue    • History of heart murmur in childhood    • Hyperlipidemia    • Hypertension    • Hypothyroidism    • Obesity    • Positive urine drug screen 03/2016   • Screening PSA (prostate specific antigen) 2014         Current Meds:     Current Outpatient Medications   Medication Sig Dispense Refill   • B-D 3CC LUER-ALBIN SYR 21GX1\" 21G X 1\" 3 ML misc   0   • BD DISP NEEDLES 20G X 1-1/2\" misc   0   • diazePAM (VALIUM) 5 MG tablet Take 5 mg by mouth 2 (Two) Times a Day.     • HYDROcodone-acetaminophen (NORCO) 5-325 MG per tablet 1 to 2 Tablets Every 6 Hours as Needed for PAIN 12 tablet 0   • levothyroxine (SYNTHROID, LEVOTHROID) 175 MCG tablet Take 1 tablet by mouth Daily. 30 tablet 5   • lisinopril (PRINIVIL,ZESTRIL) 20 MG tablet Take 1 tablet by mouth Daily. 30 tablet 5   • loratadine (CLARITIN) 10 MG tablet Take 1 tablet by mouth Daily. 30 tablet 5   • neomycin-polymyxin-hydrocortisone (CORTISPORIN) 1 % solution otic solution Administer 4 drops into the left ear 4 (Four) Times a Day. 10 mL 0   • OLANZapine (zyPREXA) 5 MG tablet Take 5 mg by mouth Every Night.     • Syringe, Disposable, 3 ML misc Use 3 ml syringe with a 25 gauge  5/8 inch needle 24 each 6   • Testosterone Cypionate (DEPO-TESTOSTERONE) 200 MG/ML injection He is to use 1/2 cc every Monday and Thursday SQ 10 mL 2   • Testosterone Cypionate (DEPOTESTOTERONE CYPIONATE) 200 MG/ML injection   0   • venlafaxine XR (EFFEXOR-XR) 37.5 MG 24 hr capsule Take " 37.5 mg by mouth Daily.  1     No current facility-administered medications for this visit.         Allergies:      No Known Allergies     Past Surgical History:     Past Surgical History:   Procedure Laterality Date   • ABDOMINAL SURGERY     • CHOLECYSTECTOMY     • CHOLECYSTECTOMY WITH INTRAOPERATIVE CHOLANGIOGRAM N/A 9/5/2017    Procedure: CHOLECYSTECTOMY LAPAROSCOPIC INTRAOPERATIVE CHOLANGIOGRAM;  Surgeon: Joni Dawn MD;  Location: [facility];  Service:    • COLONOSCOPY N/A 1/4/2018    Procedure: COLONOSCOPY CPT CODE: 55987;  Surgeon: Remington Burroughs III, MD;  Location: [facility] OR;  Service:    • GASTRIC BANDING  08/21/2016   • VASECTOMY     • WISDOM TOOTH EXTRACTION           Social History:     Social History     Socioeconomic History   • Marital status:      Spouse name: Not on file   • Number of children: Not on file   • Years of education: Not on file   • Highest education level: Not on file   Occupational History   • Occupation:    Tobacco Use   • Smoking status: Light Tobacco Smoker   • Smokeless tobacco: Current User     Types: Snuff   Substance and Sexual Activity   • Alcohol use: Yes     Alcohol/week: 18.0 oz     Types: 30 Cans of beer per week     Comment: Drinks a 30 pack of beer and one bottle of whiskey  per week.   • Drug use: No   • Sexual activity: Defer       Family History:     Family History   Problem Relation Age of Onset   • Cancer Mother         breast   • Lymphoma Father    • Cancer Sister         lung        Review of Systems:     Review of Systems   Constitutional: Negative.    HENT: Negative.    Eyes: Negative.    Respiratory: Negative.    Cardiovascular: Negative.    Gastrointestinal: Negative.    Endocrine: Negative.    Musculoskeletal: Negative.    Allergic/Immunologic: Negative.    Neurological: Negative.    Hematological: Negative.    Psychiatric/Behavioral: Negative.        Physical Exam:     Physical Exam   Constitutional: He is oriented to person,  place, and time. He appears well-developed and well-nourished.   HENT:   Head: Normocephalic and atraumatic.   Eyes: Conjunctivae and EOM are normal. Pupils are equal, round, and reactive to light.   Neck: Normal range of motion.   Cardiovascular: Normal rate, regular rhythm, normal heart sounds and intact distal pulses.   Pulmonary/Chest: Effort normal and breath sounds normal.   Abdominal: Soft. Bowel sounds are normal.   Musculoskeletal: Normal range of motion.   Neurological: He is alert and oriented to person, place, and time. He has normal reflexes.   Skin: Skin is warm and dry.   Psychiatric: He has a normal mood and affect. His behavior is normal. Judgment and thought content normal.   Nursing note and vitals reviewed.      I have reviewed the following portions of the patient's history: allergies, current medications, past family history, past medical history, past social history, past surgical history, problem list and ROS and confirm it's accurate.      Procedure:       Assessment/Plan:   Low testosterone:  patient is here for follow-up.  Since beginning the medication he's been very pleased.  He reports a dramatic improvement in his erections, ability to achieve and maintain an erection, improvement in libido, increase in frequency of morning erections, a noticeable weight loss consistent with the treatment.  No development of breast problems or abnormalities.  He's going to have appropriate safety laboratory parameters checked.   He understands that the new data implicates testosterone with the development of prostate cancer and this is all but been disproven and the medical literature as well as the risks of cardiovascular disease which is actually also been disproven.  He understands that while he is a candidate for topical therapy if he is in contact with children this is not an option because it's been shown to accentuate genitalia development at an early age that this frequently irreversible.  He  also understands this is a controlled substance and as such will not be prescribed without appropriate follow-up and appropriate laboratory investigation.  He understands effects on spermatogenesis including the fact that this is not always completely reversible and not always completely limited his ability to father a child.  He has demonstrated facility in the technique of both intramuscular and subcutaneous injection.  And has been taught sterility one drawing up the medication.  Ejaculatory dysfunction-discussed the need to increase fluids discussed the side effects of certain medications      Patient reports that he is not currently experiencing any symptoms of urinary incontinence.      Patient's Body mass index is 44.3 kg/m². BMI is above normal parameters. Recommendations include: educational material.              This document has been electronically signed by DIANE WELCH MD May 24, 2019 8:08 AM

## 2019-05-31 DIAGNOSIS — E28.0 ESTRADIOL EXCESS: Primary | ICD-10-CM

## 2019-05-31 RX ORDER — ANASTROZOLE 1 MG/1
1 TABLET ORAL WEEKLY
Qty: 12 TABLET | Refills: 3 | Status: SHIPPED | OUTPATIENT
Start: 2019-05-31

## 2019-09-23 ENCOUNTER — OFFICE VISIT (OUTPATIENT)
Dept: UROLOGY | Facility: CLINIC | Age: 39
End: 2019-09-23

## 2019-09-23 VITALS — HEIGHT: 69 IN | BODY MASS INDEX: 43.55 KG/M2 | WEIGHT: 294 LBS

## 2019-09-23 DIAGNOSIS — E29.1 HYPOGONADISM IN MALE: Primary | ICD-10-CM

## 2019-09-23 DIAGNOSIS — N53.19 OTHER EJACULATORY DYSFUNCTION: ICD-10-CM

## 2019-09-23 PROCEDURE — 99213 OFFICE O/P EST LOW 20 MIN: CPT | Performed by: UROLOGY

## 2019-09-23 NOTE — PROGRESS NOTES
"Chief Complaint:          Chief Complaint   Patient presents with   • Circumcision Consult       HPI:   39 y.o. male.  Returns today.  He has low testosterone.  Is doing much better.  He also uses intermittent Viagra.  He has no orgasms.  He has a girlfriend is 29 he has sex nightly he just wanted to be sure the fact that he did not ejaculate on a regular basis was not a problem I am to follow back up with him at his regular scheduled time    Past Medical History:        Past Medical History:   Diagnosis Date   • Anger    • Anxiety    • Arthritis    • Chronic back pain    • Depression    • Fatigue    • History of heart murmur in childhood    • Hyperlipidemia    • Hypertension    • Hypothyroidism    • Obesity    • Positive urine drug screen 03/2016   • Screening PSA (prostate specific antigen) 2014         Current Meds:     Current Outpatient Medications   Medication Sig Dispense Refill   • anastrozole (ARIMIDEX) 1 MG tablet Take 1 tablet by mouth 1 (One) Time Per Week. FOR 12 WEEKS 12 tablet 3   • B-D 3CC LUER-ALBIN SYR 21GX1\" 21G X 1\" 3 ML misc   0   • BD DISP NEEDLES 20G X 1-1/2\" misc   0   • diazePAM (VALIUM) 5 MG tablet Take 5 mg by mouth 2 (Two) Times a Day.     • HYDROcodone-acetaminophen (NORCO) 5-325 MG per tablet 1 to 2 Tablets Every 6 Hours as Needed for PAIN 12 tablet 0   • levothyroxine (SYNTHROID, LEVOTHROID) 175 MCG tablet Take 1 tablet by mouth Daily. 30 tablet 5   • lisinopril (PRINIVIL,ZESTRIL) 20 MG tablet Take 1 tablet by mouth Daily. 30 tablet 5   • loratadine (CLARITIN) 10 MG tablet Take 1 tablet by mouth Daily. 30 tablet 5   • neomycin-polymyxin-hydrocortisone (CORTISPORIN) 1 % solution otic solution Administer 4 drops into the left ear 4 (Four) Times a Day. 10 mL 0   • OLANZapine (zyPREXA) 5 MG tablet Take 5 mg by mouth Every Night.     • Syringe, Disposable, 3 ML misc Use 3 ml syringe with a 25 gauge  5/8 inch needle 24 each 6   • Testosterone Cypionate (DEPO-TESTOSTERONE) 200 MG/ML injection " He is to use 1/2 cc every Monday and Thursday SQ 10 mL 2   • venlafaxine XR (EFFEXOR-XR) 37.5 MG 24 hr capsule Take 37.5 mg by mouth Daily.  1     No current facility-administered medications for this visit.         Allergies:      No Known Allergies     Past Surgical History:     Past Surgical History:   Procedure Laterality Date   • ABDOMINAL SURGERY     • CHOLECYSTECTOMY     • CHOLECYSTECTOMY WITH INTRAOPERATIVE CHOLANGIOGRAM N/A 9/5/2017    Procedure: CHOLECYSTECTOMY LAPAROSCOPIC INTRAOPERATIVE CHOLANGIOGRAM;  Surgeon: Joni Dawn MD;  Location: [facility] OR;  Service:    • COLONOSCOPY N/A 1/4/2018    Procedure: COLONOSCOPY CPT CODE: 80810;  Surgeon: Remington Burroughs III, MD;  Location: [facility] OR;  Service:    • GASTRIC BANDING  08/21/2016   • VASECTOMY     • WISDOM TOOTH EXTRACTION           Social History:     Social History     Socioeconomic History   • Marital status:      Spouse name: Not on file   • Number of children: Not on file   • Years of education: Not on file   • Highest education level: Not on file   Occupational History   • Occupation:    Tobacco Use   • Smoking status: Light Tobacco Smoker   • Smokeless tobacco: Current User     Types: Snuff   Substance and Sexual Activity   • Alcohol use: Yes     Alcohol/week: 18.0 oz     Types: 30 Cans of beer per week     Comment: Drinks a 30 pack of beer and one bottle of whiskey  per week.   • Drug use: No   • Sexual activity: Defer       Family History:     Family History   Problem Relation Age of Onset   • Cancer Mother         breast   • Lymphoma Father    • Cancer Sister         lung        Review of Systems:     Review of Systems   Constitutional: Negative.    HENT: Negative.    Eyes: Negative.    Respiratory: Negative.    Cardiovascular: Negative.    Gastrointestinal: Negative.    Endocrine: Negative.    Musculoskeletal: Negative.    Allergic/Immunologic: Negative.    Neurological: Negative.    Hematological: Negative.     Psychiatric/Behavioral: Negative.        Physical Exam:     Physical Exam   Constitutional: He is oriented to person, place, and time. He appears well-developed and well-nourished.   HENT:   Head: Normocephalic and atraumatic.   Eyes: Conjunctivae and EOM are normal. Pupils are equal, round, and reactive to light.   Neck: Normal range of motion.   Cardiovascular: Normal rate, regular rhythm, normal heart sounds and intact distal pulses.   Pulmonary/Chest: Effort normal and breath sounds normal.   Abdominal: Soft. Bowel sounds are normal.   Musculoskeletal: Normal range of motion.   Neurological: He is alert and oriented to person, place, and time. He has normal reflexes.   Skin: Skin is warm and dry.   Psychiatric: He has a normal mood and affect. His behavior is normal. Judgment and thought content normal.   Nursing note and vitals reviewed.      I have reviewed the following portions of the patient's history: allergies, current medications, past family history, past medical history, past social history, past surgical history, problem list and ROS and confirm it's accurate.      Procedure:       Assessment/Plan:   Low testosterone:  patient is here for follow-up.  Since beginning the medication he's been very pleased.  He reports a dramatic improvement in his erections, ability to achieve and maintain an erection, improvement in libido, increase in frequency of morning erections, a noticeable weight loss consistent with the treatment.  No development of breast problems or abnormalities.  He's going to have appropriate safety laboratory parameters checked.   He understands that the new data implicates testosterone with the development of prostate cancer and this is all but been disproven and the medical literature as well as the risks of cardiovascular disease which is actually also been disproven.  He understands that while he is a candidate for topical therapy if he is in contact with children this is not an  option because it's been shown to accentuate genitalia development at an early age that this frequently irreversible.  He also understands this is a controlled substance and as such will not be prescribed without appropriate follow-up and appropriate laboratory investigation.  He understands effects on spermatogenesis including the fact that this is not always completely reversible and not always completely limited his ability to father a child.  He has demonstrated facility in the technique of both intramuscular and subcutaneous injection.  And has been taught sterility one drawing up the medication.  Erectile dysfunction-he has a great erection he just is concerned with lightly sex he does not ejaculate as much as he should.  He was educated  extensively            Patient's Body mass index is 44.3 kg/m². BMI is above normal parameters. Recommendations include: educational material.              This document has been electronically signed by DIANE WELCH MD September 23, 2019 10:57 AM

## 2019-10-21 ENCOUNTER — OFFICE VISIT (OUTPATIENT)
Dept: FAMILY MEDICINE CLINIC | Facility: CLINIC | Age: 39
End: 2019-10-21

## 2019-10-21 VITALS
DIASTOLIC BLOOD PRESSURE: 85 MMHG | TEMPERATURE: 98.8 F | BODY MASS INDEX: 42.75 KG/M2 | WEIGHT: 288.6 LBS | SYSTOLIC BLOOD PRESSURE: 130 MMHG | OXYGEN SATURATION: 97 % | HEART RATE: 83 BPM | HEIGHT: 69 IN

## 2019-10-21 DIAGNOSIS — F32.A ANXIETY AND DEPRESSION: Primary | ICD-10-CM

## 2019-10-21 DIAGNOSIS — E07.9 DISEASE OF THYROID GLAND: ICD-10-CM

## 2019-10-21 DIAGNOSIS — I10 BENIGN ESSENTIAL HTN: ICD-10-CM

## 2019-10-21 DIAGNOSIS — J30.1 SEASONAL ALLERGIC RHINITIS DUE TO POLLEN: ICD-10-CM

## 2019-10-21 DIAGNOSIS — F41.9 ANXIETY AND DEPRESSION: Primary | ICD-10-CM

## 2019-10-21 PROCEDURE — 99214 OFFICE O/P EST MOD 30 MIN: CPT | Performed by: NURSE PRACTITIONER

## 2019-10-21 RX ORDER — LORATADINE 10 MG/1
10 TABLET ORAL DAILY
Qty: 30 TABLET | Refills: 5 | Status: SHIPPED | OUTPATIENT
Start: 2019-10-21 | End: 2020-05-12

## 2019-10-21 RX ORDER — LEVOTHYROXINE SODIUM 175 UG/1
175 TABLET ORAL DAILY
Qty: 30 TABLET | Refills: 5 | Status: SHIPPED | OUTPATIENT
Start: 2019-10-21 | End: 2020-07-01 | Stop reason: SDUPTHER

## 2019-10-21 RX ORDER — VENLAFAXINE HYDROCHLORIDE 37.5 MG/1
37.5 CAPSULE, EXTENDED RELEASE ORAL DAILY
Refills: 1 | Status: CANCELLED | OUTPATIENT
Start: 2019-10-21

## 2019-10-21 RX ORDER — LISINOPRIL 20 MG/1
20 TABLET ORAL DAILY
Qty: 30 TABLET | Refills: 5 | Status: SHIPPED | OUTPATIENT
Start: 2019-10-21 | End: 2020-05-12

## 2019-10-21 RX ORDER — VENLAFAXINE HYDROCHLORIDE 75 MG/1
75 CAPSULE, EXTENDED RELEASE ORAL DAILY
Qty: 30 CAPSULE | Refills: 5 | Status: SHIPPED | OUTPATIENT
Start: 2019-10-21 | End: 2020-04-14

## 2019-10-21 NOTE — PROGRESS NOTES
Subjective   Brady Mcknight is a 39 y.o. male.   Chief Compliant: The patient presents with Depression and Anxiety (take over writing Effexor?)    2w      Allergies   This is a new problem. The current episode started 1 to 4 weeks ago. The problem occurs daily. The problem has been waxing and waning. Associated symptoms include fatigue. Pertinent negatives include no chest pain, headaches or neck pain. Associated symptoms comments: Runny nose and congestion  . Exacerbated by: Bike ride over 200 mile and noticed pollen in his beard when he returned home. Treatments tried: claritin. The treatment provided moderate relief.   Hypertension   This is a chronic problem. The problem is controlled. Associated symptoms include anxiety. Pertinent negatives include no blurred vision, chest pain, headaches, malaise/fatigue, neck pain, orthopnea, palpitations, peripheral edema, PND, shortness of breath or sweats. Risk factors for coronary artery disease include family history, obesity, sedentary lifestyle and stress. Past treatments include ACE inhibitors. Current antihypertension treatment includes nothing. Compliance problems include exercise and diet (lap banding).  Identifiable causes of hypertension include a thyroid problem.   Thyroid Problem   Presents for follow-up (Known Hypothyroidism) visit. Symptoms include anxiety and fatigue. Patient reports no palpitations. (Denies any symptoms  ) The symptoms have been stable.   Anxiety   Presents for follow-up (Known depression and anxiety.  Feels he is doing better.  Would like to follow here as he has followed with paych for some time and no changes made) visit. Symptoms include excessive worry and nervous/anxious behavior. Patient reports no chest pain, palpitations or shortness of breath. Symptoms occur most days. The severity of symptoms is mild. The quality of sleep is fair. Nighttime awakenings: occasional.          The following portions of the patient's history were reviewed  "and updated as appropriate: allergies, current medications, past family history, past medical history, past social history, past surgical history and problem list.      Review of Systems   Constitutional: Positive for fatigue. Negative for activity change, appetite change and malaise/fatigue.   Eyes: Negative for blurred vision.   Respiratory: Negative.  Negative for shortness of breath.    Cardiovascular: Negative.  Negative for chest pain, palpitations, orthopnea, leg swelling and PND.   Musculoskeletal: Negative.  Negative for neck pain.   Skin: Negative.    Neurological: Negative for headaches.   Psychiatric/Behavioral: Positive for agitation (continues to follow with psych ). The patient is nervous/anxious.    All other systems reviewed and are negative.      Procedures    Vitals: Blood pressure 130/85, pulse 83, temperature 98.8 °F (37.1 °C), temperature source Oral, height 175.3 cm (69\"), weight 131 kg (288 lb 9.6 oz), SpO2 97 %.     Allergies: No Known Allergies       Objective   Physical Exam   Constitutional: He is oriented to person, place, and time. He appears well-developed and well-nourished. No distress.   HENT:   Head: Normocephalic.   Right Ear: Hearing, tympanic membrane, external ear and ear canal normal.   Left Ear: Hearing and external ear normal. There is swelling and tenderness.   Nose: Nose normal. Right sinus exhibits no maxillary sinus tenderness and no frontal sinus tenderness. Left sinus exhibits no maxillary sinus tenderness and no frontal sinus tenderness.   Mouth/Throat: Oropharynx is clear and moist and mucous membranes are normal. No oropharyngeal exudate. No tonsillar exudate.   Eyes: Conjunctivae are normal. Right eye exhibits no discharge. Left eye exhibits no discharge.   Neck: Neck supple.   Cardiovascular: Normal rate, regular rhythm and normal heart sounds.   No murmur heard.  Pulmonary/Chest: Effort normal and breath sounds normal. No respiratory distress.   Neurological: He " is alert and oriented to person, place, and time.   Skin: Skin is warm and dry. He is not diaphoretic.   1 cm flesh colored cyst right posterior neck   No warmth, tenderness or induration      Psychiatric: He has a normal mood and affect. His behavior is normal. Judgment and thought content normal.   Nursing note and vitals reviewed.      During this visit the following were done:  Labs Reviewed []    Labs Ordered [x]    Radiology Reports Reviewed []    Radiology Ordered []    PCP Records Reviewed []    Referring Provider Records Reviewed []    ER Records Reviewed []    Hospital Records Reviewed []    History Obtained From Family []    Radiology Images Reviewed []    Other Reviewed []    Records Requested []      Brady was seen today for depression and anxiety.    Diagnoses and all orders for this visit:    Anxiety and depression  -     venlafaxine XR (EFFEXOR XR) 75 MG 24 hr capsule; Take 1 capsule by mouth Daily.    Benign essential HTN  -     lisinopril (PRINIVIL,ZESTRIL) 20 MG tablet; Take 1 tablet by mouth Daily.  -     Comprehensive Metabolic Panel; Future  -     Lipid Panel; Future  -     Vitamin B12; Future  -     TSH; Future    Disease of thyroid gland  -     levothyroxine (SYNTHROID, LEVOTHROID) 175 MCG tablet; Take 1 tablet by mouth Daily.  -     TSH; Future    Seasonal allergic rhinitis due to pollen  -     loratadine (CLARITIN) 10 MG tablet; Take 1 tablet by mouth Daily.    Other orders  -     Cancel: venlafaxine XR (EFFEXOR-XR) 37.5 MG 24 hr capsule; Take 1 capsule by mouth Daily.      Patient is a non smoker    Patient's Body mass index is 42.62 kg/m². BMI is above normal parameters. Recommendations include: exercise counseling and nutrition counseling.

## 2019-12-06 ENCOUNTER — TELEPHONE (OUTPATIENT)
Dept: FAMILY MEDICINE CLINIC | Facility: CLINIC | Age: 39
End: 2019-12-06

## 2019-12-06 RX ORDER — PROMETHAZINE HYDROCHLORIDE 25 MG/1
25 TABLET ORAL EVERY 8 HOURS PRN
Qty: 12 TABLET | Refills: 0 | Status: SHIPPED | OUTPATIENT
Start: 2019-12-06

## 2019-12-20 DIAGNOSIS — E29.1 HYPOGONADISM IN MALE: ICD-10-CM

## 2019-12-23 RX ORDER — TESTOSTERONE CYPIONATE 200 MG/ML
INJECTION, SOLUTION INTRAMUSCULAR
Qty: 10 ML | Refills: 2 | OUTPATIENT
Start: 2019-12-23

## 2020-02-25 ENCOUNTER — TELEPHONE (OUTPATIENT)
Dept: UROLOGY | Facility: CLINIC | Age: 40
End: 2020-02-25

## 2020-02-25 ENCOUNTER — OFFICE VISIT (OUTPATIENT)
Dept: UROLOGY | Facility: CLINIC | Age: 40
End: 2020-02-25

## 2020-02-25 VITALS — BODY MASS INDEX: 41.77 KG/M2 | WEIGHT: 282 LBS | HEIGHT: 69 IN

## 2020-02-25 DIAGNOSIS — E29.1 HYPOGONADISM IN MALE: Primary | ICD-10-CM

## 2020-02-25 DIAGNOSIS — R79.89 LOW TESTOSTERONE IN MALE: ICD-10-CM

## 2020-02-25 LAB
DEPRECATED RDW RBC AUTO: 40.8 FL (ref 37–54)
ERYTHROCYTE [DISTWIDTH] IN BLOOD BY AUTOMATED COUNT: 12.4 % (ref 12.3–15.4)
ESTRADIOL SERPL HS-MCNC: 17.9 PG/ML
HCT VFR BLD AUTO: 45.7 % (ref 37.5–51)
HGB BLD-MCNC: 14.9 G/DL (ref 13–17.7)
MCH RBC QN AUTO: 29.3 PG (ref 26.6–33)
MCHC RBC AUTO-ENTMCNC: 32.6 G/DL (ref 31.5–35.7)
MCV RBC AUTO: 90 FL (ref 79–97)
PLATELET # BLD AUTO: 325 10*3/MM3 (ref 140–450)
PMV BLD AUTO: 9.6 FL (ref 6–12)
PSA SERPL-MCNC: 0.25 NG/ML (ref 0–4)
RBC # BLD AUTO: 5.08 10*6/MM3 (ref 4.14–5.8)
TESTOST SERPL-MCNC: 117 NG/DL (ref 249–836)
WBC NRBC COR # BLD: 6.95 10*3/MM3 (ref 3.4–10.8)

## 2020-02-25 PROCEDURE — 82670 ASSAY OF TOTAL ESTRADIOL: CPT | Performed by: NURSE PRACTITIONER

## 2020-02-25 PROCEDURE — 84153 ASSAY OF PSA TOTAL: CPT | Performed by: NURSE PRACTITIONER

## 2020-02-25 PROCEDURE — 85027 COMPLETE CBC AUTOMATED: CPT | Performed by: NURSE PRACTITIONER

## 2020-02-25 PROCEDURE — 84403 ASSAY OF TOTAL TESTOSTERONE: CPT | Performed by: NURSE PRACTITIONER

## 2020-02-25 PROCEDURE — 99214 OFFICE O/P EST MOD 30 MIN: CPT | Performed by: NURSE PRACTITIONER

## 2020-02-25 RX ORDER — SILDENAFIL CITRATE 20 MG/1
20 TABLET ORAL DAILY
Qty: 24 TABLET | Refills: 6 | Status: SHIPPED | OUTPATIENT
Start: 2020-02-25 | End: 2020-04-13

## 2020-02-25 RX ORDER — TESTOSTERONE CYPIONATE 200 MG/ML
INJECTION, SOLUTION INTRAMUSCULAR
Qty: 10 ML | Refills: 2 | Status: SHIPPED | OUTPATIENT
Start: 2020-02-25 | End: 2020-11-12 | Stop reason: SDUPTHER

## 2020-02-25 NOTE — PROGRESS NOTES
"Chief Complaint:          Chief Complaint   Patient presents with   • Hypogonadism       HPI:   39 y.o. male.  Pt Returns today for his Six month follow up.    He  reports he recently got remarried and would like a Vasectomy Reversal  Referral. He is wanting some recommendations.    He has been on Testosterone replacement therapy, likes it and would like some refills today. He is here for appropriate lab monitoring regarding this. He understands this is a controlled substance and therefore must be watched closely. He knows his medications will not be refilled in case of any reported medication loss or miss calculation of the dose. He is very happy with the treatment and therefore wants to continue it.    He also reports a dramatic improvement in his carmen questionnaire: Carmen Androgen Deficiency in Age Male Questionnaire. This is a validated questionnaire that was performed on a set  Of 314 [place] Males Physicians. When it was positive, it crenelated directly with a 94% chance of low testosterone.    Patient indicates there is a decrease in their libido or sex drive, a lack of energy, decreased strength and endurance, decreased \"enjoyment of life\", Sad and grumpy feelings with a significant difficulty maintaining erections. They also will report a recent deterioration regarding their work performance.    He Reports some weight loss,. His only residual is that of some anxiety and depression.  For which he takes Effexor which does have some component of ejaculatory effects. He is overall happy with his new wife and anxious over his Vasectomy reversal.    He states they have found someone in [place] who can do it for $3 thousand dollars. But would lie a cheaper price or get the insurance to \"pay for it\". I told him that was an elective procedure and most often insurance might not pay for it, unless its a medical emergency.    Will get some labs.        Past Medical History:        Past Medical History:   Diagnosis Date " "  • Anger    • Anxiety    • Arthritis    • Chronic back pain    • Depression    • Fatigue    • History of heart murmur in childhood    • Hyperlipidemia    • Hypertension    • Hypothyroidism    • Obesity    • Positive urine drug screen 03/2016   • Screening PSA (prostate specific antigen) 2014         The following portions of the patient's history were reviewed and updated as appropriate: allergies, current medications, past family history, past medical history, past social history, past surgical history and problem list.    Current Meds:     Current Outpatient Medications   Medication Sig Dispense Refill   • anastrozole (ARIMIDEX) 1 MG tablet Take 1 tablet by mouth 1 (One) Time Per Week. FOR 12 WEEKS 12 tablet 3   • B-D 3CC LUER-ALBIN SYR 21GX1\" 21G X 1\" 3 ML misc   0   • BD DISP NEEDLES 20G X 1-1/2\" misc   0   • HYDROcodone-acetaminophen (NORCO) 5-325 MG per tablet 1 to 2 Tablets Every 6 Hours as Needed for PAIN 12 tablet 0   • levothyroxine (SYNTHROID, LEVOTHROID) 175 MCG tablet Take 1 tablet by mouth Daily. 30 tablet 5   • lisinopril (PRINIVIL,ZESTRIL) 20 MG tablet Take 1 tablet by mouth Daily. 30 tablet 5   • loratadine (CLARITIN) 10 MG tablet Take 1 tablet by mouth Daily. 30 tablet 5   • neomycin-polymyxin-hydrocortisone (CORTISPORIN) 1 % solution otic solution Administer 4 drops into the left ear 4 (Four) Times a Day. 10 mL 0   • promethazine (PHENERGAN) 25 MG tablet Take 1 tablet by mouth Every 8 (Eight) Hours As Needed for Nausea or Vomiting. 12 tablet 0   • Syringe, Disposable, 3 ML misc Use 3 ml syringe with a 25 gauge  5/8 inch needle 24 each 6   • Testosterone Cypionate (DEPO-TESTOSTERONE) 200 MG/ML injection He is to use 1/2 cc every Monday and Thursday SQ 10 mL 2   • venlafaxine XR (EFFEXOR XR) 75 MG 24 hr capsule Take 1 capsule by mouth Daily. 30 capsule 5     No current facility-administered medications for this visit.         Allergies:      No Known Allergies     Past Surgical History:     Past " Surgical History:   Procedure Laterality Date   • ABDOMINAL SURGERY     • CHOLECYSTECTOMY     • CHOLECYSTECTOMY WITH INTRAOPERATIVE CHOLANGIOGRAM N/A 9/5/2017    Procedure: CHOLECYSTECTOMY LAPAROSCOPIC INTRAOPERATIVE CHOLANGIOGRAM;  Surgeon: Joni Dawn MD;  Location: [facility];  Service:    • COLONOSCOPY N/A 1/4/2018    Procedure: COLONOSCOPY CPT CODE: 01758;  Surgeon: Remington Burroughs III, MD;  Location: [facility];  Service:    • GASTRIC BANDING  08/21/2016   • VASECTOMY     • WISDOM TOOTH EXTRACTION           Social History:     Social History     Socioeconomic History   • Marital status:      Spouse name: Not on file   • Number of children: Not on file   • Years of education: Not on file   • Highest education level: Not on file   Occupational History   • Occupation:    Tobacco Use   • Smoking status: Former Smoker   • Smokeless tobacco: Current User     Types: Snuff   Substance and Sexual Activity   • Alcohol use: Yes     Alcohol/week: 30.0 standard drinks     Types: 30 Cans of beer per week     Comment: Drinks a 30 pack of beer and one bottle of whiskey  per week.   • Drug use: No   • Sexual activity: Defer       Family History:     Family History   Problem Relation Age of Onset   • Cancer Mother         breast   • Lymphoma Father    • Cancer Sister         lung        Review of Systems:     Review of Systems   Constitutional: Positive for fatigue. Negative for activity change, appetite change, chills and fever.   HENT: Negative for congestion and sinus pressure.    Eyes: Negative for blurred vision and double vision.   Respiratory: Negative for cough, shortness of breath and wheezing.    Cardiovascular: Negative for leg swelling.   Gastrointestinal: Negative for abdominal pain, constipation, diarrhea, nausea and vomiting.   Genitourinary: Positive for erectile dysfunction. Negative for difficulty urinating, discharge, dysuria, flank pain, frequency, genital sores, hematuria,  penile pain, penile swelling, scrotal swelling, testicular pain, urgency and urinary incontinence.   Musculoskeletal: Negative for back pain.   Neurological: Negative for dizziness, weakness, headache and confusion.   Psychiatric/Behavioral: Negative for agitation, behavioral problems, decreased concentration, sleep disturbance, suicidal ideas and stress. The patient is not nervous/anxious.         Physical Exam:     Physical Exam   Constitutional: He is oriented to person, place, and time. He appears well-developed and well-nourished. No distress.   HENT:   Head: Normocephalic and atraumatic.   Right Ear: External ear normal.   Left Ear: External ear normal.   Eyes: Pupils are equal, round, and reactive to light. Conjunctivae and EOM are normal. Right eye exhibits no discharge. Left eye exhibits no discharge.   Neck: Normal range of motion. Neck supple. No tracheal deviation present. No thyromegaly present.   Cardiovascular: Normal rate and regular rhythm. Exam reveals no friction rub.   No murmur heard.  Pulmonary/Chest: Effort normal and breath sounds normal. No stridor. No respiratory distress.   Abdominal: Soft. Bowel sounds are normal. He exhibits no distension. There is no tenderness. There is no guarding.   Genitourinary: Rectum normal, testes normal and penis normal. Rectal exam shows guaiac negative stool. Uncircumcised. No penile tenderness. No discharge found.   Musculoskeletal: Normal range of motion. He exhibits no edema, tenderness or deformity.   Neurological: He is alert and oriented to person, place, and time. No cranial nerve deficit. Coordination normal.   Skin: Skin is warm and dry. Capillary refill takes less than 2 seconds. No rash noted. No erythema. No pallor.   Psychiatric: He has a normal mood and affect. His behavior is normal. Judgment and thought content normal.       Procedure:       Assessment/Plan:   Hypogonadism/ Low testosterone: Very pleasant white male presents for follow-up  today. He is very anxious about  Finding someone to do his vasectomy reversal.  He states he recently got  and would want some kids.    He reports that, since beginning the medication he's been very pleased. He states that, this is one of the best things that ever happened to him. He reports a dramatic improvement in his erections, ability to achieve and maintain an erection, improvement in libido, increase in frequency of morning erections, and a noticeable weight loss consistent with the treatment.     He denies having any development of breast problems or abnormalities.  He's going to have appropriate safety laboratory parameters checked today.   He understands that the new data implicates testosterone with the development of prostate cancer and this is all but been disproven and the medical literature as well as the risks of cardiovascular disease which is actually also been disproven.      He understands that while he is a candidate for topical therapy if he is in contact with children this is not an option because it's been shown to accentuate genitalia development at an early age that this is frequently irreversible.      He also understands this is a controlled substance and as such will not be prescribed without appropriate follow-up and appropriate laboratory investigation. He understands effects on spermatogenesis including the fact that this is not always completely reversible and that although not always, this can completely limit his ability to father a child.      He has demonstrated facility in the technique of both intramuscular and subcutaneous injection, and has been taught sterility on drawing up the medication.  He is appreciative of this opportunity, and reports he is not going to abuse it.    We will check his labs; PSA, testosterone, CBC, estradiol.    We will see him back in 6 months      Patient reports that he is not currently experiencing any symptoms of urinary  incontinence.      Patient's Body mass index is 41.64 kg/m². BMI is above normal parameters. Recommendations include: educational material, exercise counseling and nutrition counseling.      Smoking Cessation Counseling:  Former smoker.  Patient does not currently use any tobacco products.        Counseling was given to patient for the following topics diagnostic results including: hypogonadism male, fertility enhancement, instructions for management as follows: refilled his meds today, discussed plans to stop testosterone when ready and risk factor reductions including: avoiding exposure to smoking, exposure to any carcinogens, weight loss. The interim medical history and current results were reviewed.  A treatment plan with follow-up was made for Hypogonadism in male [E29.1].         This document has been electronically signed by Griselda Cheng-Akwa, APRN February 25, 2020 9:38 AM

## 2020-02-26 PROBLEM — R79.89 LOW TESTOSTERONE IN MALE: Status: ACTIVE | Noted: 2020-02-26

## 2020-02-27 ENCOUNTER — PRIOR AUTHORIZATION (OUTPATIENT)
Dept: UROLOGY | Facility: CLINIC | Age: 40
End: 2020-02-27

## 2020-02-27 NOTE — PATIENT INSTRUCTIONS
Sildenafil tablets (Erectile Dysfunction)  What is this medicine?  SILDENAFIL (chico DEN a connie) is used to treat erection problems in men.  This medicine may be used for other purposes; ask your health care provider or pharmacist if you have questions.  COMMON BRAND NAME(S): Viagra  What should I tell my health care provider before I take this medicine?  They need to know if you have any of these conditions:  -bleeding disorders  -eye or vision problems, including a rare inherited eye disease called retinitis pigmentosa  -anatomical deformation of the penis, Peyronie's disease, or history of priapism (painful and prolonged erection)  -heart disease, angina, a history of heart attack, irregular heart beats, or other heart problems  -high or low blood pressure  -history of blood diseases, like sickle cell anemia or leukemia  -history of stomach bleeding  -kidney disease  -liver disease  -stroke  -an unusual or allergic reaction to sildenafil, other medicines, foods, dyes, or preservatives  -pregnant or trying to get pregnant  -breast-feeding  How should I use this medicine?  Take this medicine by mouth with a glass of water. Follow the directions on the prescription label. The dose is usually taken 1 hour before sexual activity. You should not take the dose more than once per day. Do not take your medicine more often than directed.  Talk to your pediatrician regarding the use of this medicine in children. This medicine is not used in children for this condition.  Overdosage: If you think you have taken too much of this medicine contact a poison control center or emergency room at once.  NOTE: This medicine is only for you. Do not share this medicine with others.  What if I miss a dose?  This does not apply. Do not take double or extra doses.  What may interact with this medicine?  Do not take this medicine with any of the following medications:  -cisapride  -nitrates like amyl nitrite, isosorbide dinitrate, isosorbide  mononitrate, nitroglycerin  -riociguat  This medicine may also interact with the following medications:  -antiviral medicines for HIV or AIDS  -bosentan  -certain medicines for benign prostatic hyperplasia (BPH)  -certain medicines for blood pressure  -certain medicines for fungal infections like ketoconazole and itraconazole  -cimetidine  -erythromycin  -rifampin  This list may not describe all possible interactions. Give your health care provider a list of all the medicines, herbs, non-prescription drugs, or dietary supplements you use. Also tell them if you smoke, drink alcohol, or use illegal drugs. Some items may interact with your medicine.  What should I watch for while using this medicine?  If you notice any changes in your vision while taking this drug, call your doctor or health care professional as soon as possible. Stop using this medicine and call your health care provider right away if you have a loss of sight in one or both eyes.  Contact your doctor or health care professional right away if you have an erection that lasts longer than 4 hours or if it becomes painful. This may be a sign of a serious problem and must be treated right away to prevent permanent damage.  If you experience symptoms of nausea, dizziness, chest pain or arm pain upon initiation of sexual activity after taking this medicine, you should refrain from further activity and call your doctor or health care professional as soon as possible.  Do not drink alcohol to excess (examples, 5 glasses of wine or 5 shots of whiskey) when taking this medicine. When taken in excess, alcohol can increase your chances of getting a headache or getting dizzy, increasing your heart rate or lowering your blood pressure.  Using this medicine does not protect you or your partner against HIV infection (the virus that causes AIDS) or other sexually transmitted diseases.  What side effects may I notice from receiving this medicine?  Side effects that you  should report to your doctor or health care professional as soon as possible:  -allergic reactions like skin rash, itching or hives, swelling of the face, lips, or tongue  -breathing problems  -changes in hearing  -changes in vision  -chest pain  -fast, irregular heartbeat  -prolonged or painful erection  -seizures  Side effects that usually do not require medical attention (report to your doctor or health care professional if they continue or are bothersome):  -back pain  -dizziness  -flushing  -headache  -indigestion  -muscle aches  -nausea  -stuffy or runny nose  This list may not describe all possible side effects. Call your doctor for medical advice about side effects. You may report side effects to FDA at 0-866-FDA-4986.  Where should I keep my medicine?  Keep out of reach of children.  Store at room temperature between 15 and 30 degrees C (59 and 86 degrees F). Throw away any unused medicine after the expiration date.  NOTE: This sheet is a summary. It may not cover all possible information. If you have questions about this medicine, talk to your doctor, pharmacist, or health care provider.  © 2020 Elsevier/Gold Standard (2016-11-30 12:00:25)  Testosterone injection  What is this medicine?  TESTOSTERONE (fabricio TOS ter one) is the main male hormone. It supports normal male development such as muscle growth, facial hair, and deep voice. It is used in males to treat low testosterone levels.  This medicine may be used for other purposes; ask your health care provider or pharmacist if you have questions.  COMMON BRAND NAME(S): Evangelist-L.A., Aveed, Delkaytryl, Depo-Testosterone, Virilon  What should I tell my health care provider before I take this medicine?  They need to know if you have any of these conditions:  -cancer  -diabetes  -heart disease  -kidney disease  -liver disease  -lung disease  -prostate disease  -an unusual or allergic reaction to testosterone, other medicines, foods, dyes, or  preservatives  -pregnant or trying to get pregnant  -breast-feeding  How should I use this medicine?  This medicine is for injection into a muscle. It is usually given by a health care professional in a hospital or clinic setting.  Contact your pediatrician regarding the use of this medicine in children. While this medicine may be prescribed for children as young as 12 years of age for selected conditions, precautions do apply.  Overdosage: If you think you have taken too much of this medicine contact a poison control center or emergency room at once.  NOTE: This medicine is only for you. Do not share this medicine with others.  What if I miss a dose?  Try not to miss a dose. Your doctor or health care professional will tell you when your next injection is due. Notify the office if you are unable to keep an appointment.  What may interact with this medicine?  -medicines for diabetes  -medicines that treat or prevent blood clots like warfarin  -oxyphenbutazone  -propranolol  -steroid medicines like prednisone or cortisone  This list may not describe all possible interactions. Give your health care provider a list of all the medicines, herbs, non-prescription drugs, or dietary supplements you use. Also tell them if you smoke, drink alcohol, or use illegal drugs. Some items may interact with your medicine.  What should I watch for while using this medicine?  Visit your doctor or health care professional for regular checks on your progress. They will need to check the level of testosterone in your blood.  This medicine is only approved for use in men who have low levels of testosterone related to certain medical conditions. Heart attacks and strokes have been reported with the use of this medicine. Notify your doctor or health care professional and seek emergency treatment if you develop breathing problems; changes in vision; confusion; chest pain or chest tightness; sudden arm pain; severe, sudden headache; trouble  speaking or understanding; sudden numbness or weakness of the face, arm or leg; loss of balance or coordination. Talk to your doctor about the risks and benefits of this medicine.  This medicine may affect blood sugar levels. If you have diabetes, check with your doctor or health care professional before you change your diet or the dose of your diabetic medicine.  Testosterone injections are not commonly used in women. Women should inform their doctor if they wish to become pregnant or think they might be pregnant. There is a potential for serious side effects to an unborn child. Talk to your health care professional or pharmacist for more information. Talk with your doctor or health care professional about your birth control options while taking this medicine.  This drug is banned from use in athletes by most athletic organizations.  What side effects may I notice from receiving this medicine?  Side effects that you should report to your doctor or health care professional as soon as possible:  -allergic reactions like skin rash, itching or hives, swelling of the face, lips, or tongue  -breast enlargement  -breathing problems  -changes in emotions or moods  -deep or hoarse voice  -irregular menstrual periods  -signs and symptoms of liver injury like dark yellow or brown urine; general ill feeling or flu-like symptoms; light-colored stools; loss of appetite; nausea; right upper belly pain; unusually weak or tired; yellowing of the eyes or skin  -stomach pain  -swelling of the ankles, feet, hands  -too frequent or persistent erections  -trouble passing urine or change in the amount of urine  Side effects that usually do not require medical attention (report to your doctor or health care professional if they continue or are bothersome):  -acne  -change in sex drive or performance  -facial hair growth  -hair loss  -headache  This list may not describe all possible side effects. Call your doctor for medical advice about  side effects. You may report side effects to FDA at 5-858-WTM-2876.  Where should I keep my medicine?  Keep out of the reach of children. This medicine can be abused. Keep your medicine in a safe place to protect it from theft. Do not share this medicine with anyone. Selling or giving away this medicine is dangerous and against the law.  Store at room temperature between 20 and 25 degrees C (68 and 77 degrees F). Do not freeze. Protect from light. Follow the directions for the product you are prescribed. Throw away any unused medicine after the expiration date.  NOTE: This sheet is a summary. It may not cover all possible information. If you have questions about this medicine, talk to your doctor, pharmacist, or health care provider.  © 2020 Elsevier/Gold Standard (2017-01-21 07:33:55)

## 2020-04-13 DIAGNOSIS — F41.9 ANXIETY AND DEPRESSION: ICD-10-CM

## 2020-04-13 DIAGNOSIS — F32.A ANXIETY AND DEPRESSION: ICD-10-CM

## 2020-04-14 RX ORDER — VENLAFAXINE HYDROCHLORIDE 75 MG/1
75 CAPSULE, EXTENDED RELEASE ORAL DAILY
Qty: 30 CAPSULE | Refills: 5 | Status: SHIPPED | OUTPATIENT
Start: 2020-04-14 | End: 2020-08-10 | Stop reason: SDUPTHER

## 2020-05-12 DIAGNOSIS — I10 BENIGN ESSENTIAL HTN: ICD-10-CM

## 2020-05-12 DIAGNOSIS — J30.1 SEASONAL ALLERGIC RHINITIS DUE TO POLLEN: ICD-10-CM

## 2020-05-12 RX ORDER — LORATADINE 10 MG/1
10 TABLET ORAL DAILY
Qty: 30 TABLET | Refills: 0 | Status: SHIPPED | OUTPATIENT
Start: 2020-05-12 | End: 2020-07-01 | Stop reason: SDUPTHER

## 2020-05-12 RX ORDER — LISINOPRIL 20 MG/1
20 TABLET ORAL DAILY
Qty: 30 TABLET | Refills: 0 | Status: SHIPPED | OUTPATIENT
Start: 2020-05-12 | End: 2020-07-01 | Stop reason: SDUPTHER

## 2020-06-04 ENCOUNTER — DOCUMENTATION (OUTPATIENT)
Dept: UROLOGY | Facility: CLINIC | Age: 40
End: 2020-06-04

## 2020-06-04 NOTE — PROGRESS NOTES
Rx sent to [facility] for HCG 10,000 units/ml inject 1000units every M,W,F one week a month with 6 refills

## 2020-07-01 DIAGNOSIS — E28.0 ESTRADIOL EXCESS: ICD-10-CM

## 2020-07-01 DIAGNOSIS — E07.9 DISEASE OF THYROID GLAND: ICD-10-CM

## 2020-07-01 DIAGNOSIS — J30.1 SEASONAL ALLERGIC RHINITIS DUE TO POLLEN: ICD-10-CM

## 2020-07-01 DIAGNOSIS — I10 BENIGN ESSENTIAL HTN: ICD-10-CM

## 2020-07-01 RX ORDER — ANASTROZOLE 1 MG/1
TABLET ORAL
Refills: 3 | OUTPATIENT
Start: 2020-07-01

## 2020-07-01 RX ORDER — LORATADINE 10 MG/1
10 TABLET ORAL DAILY
Qty: 30 TABLET | Refills: 0 | OUTPATIENT
Start: 2020-07-01

## 2020-07-01 RX ORDER — LORATADINE 10 MG/1
10 TABLET ORAL DAILY
Qty: 30 TABLET | Refills: 0 | Status: SHIPPED | OUTPATIENT
Start: 2020-07-01 | End: 2020-08-10 | Stop reason: SDUPTHER

## 2020-07-01 RX ORDER — LEVOTHYROXINE SODIUM 175 UG/1
175 TABLET ORAL DAILY
Qty: 30 TABLET | Refills: 0 | Status: SHIPPED | OUTPATIENT
Start: 2020-07-01 | End: 2020-08-10 | Stop reason: SDUPTHER

## 2020-07-01 RX ORDER — LEVOTHYROXINE SODIUM 175 UG/1
175 TABLET ORAL DAILY
Qty: 30 TABLET | Refills: 5 | OUTPATIENT
Start: 2020-07-01

## 2020-07-01 RX ORDER — LISINOPRIL 20 MG/1
20 TABLET ORAL DAILY
Qty: 30 TABLET | Refills: 0 | Status: SHIPPED | OUTPATIENT
Start: 2020-07-01 | End: 2020-08-10 | Stop reason: SDUPTHER

## 2020-07-01 RX ORDER — LISINOPRIL 20 MG/1
20 TABLET ORAL DAILY
Qty: 30 TABLET | Refills: 0 | OUTPATIENT
Start: 2020-07-01

## 2020-07-01 NOTE — TELEPHONE ENCOUNTER
Pts estradiol level was normal when his labs were checked in Feb, we will re-check all the levels at his next appt

## 2020-08-10 ENCOUNTER — OFFICE VISIT (OUTPATIENT)
Dept: FAMILY MEDICINE CLINIC | Facility: CLINIC | Age: 40
End: 2020-08-10

## 2020-08-10 VITALS
HEART RATE: 91 BPM | WEIGHT: 303 LBS | DIASTOLIC BLOOD PRESSURE: 80 MMHG | TEMPERATURE: 96.6 F | BODY MASS INDEX: 44.88 KG/M2 | OXYGEN SATURATION: 99 % | HEIGHT: 69 IN | SYSTOLIC BLOOD PRESSURE: 140 MMHG

## 2020-08-10 DIAGNOSIS — F32.A ANXIETY AND DEPRESSION: ICD-10-CM

## 2020-08-10 DIAGNOSIS — E07.9 DISEASE OF THYROID GLAND: ICD-10-CM

## 2020-08-10 DIAGNOSIS — F41.9 ANXIETY AND DEPRESSION: ICD-10-CM

## 2020-08-10 DIAGNOSIS — I10 BENIGN ESSENTIAL HTN: Primary | ICD-10-CM

## 2020-08-10 DIAGNOSIS — J30.1 SEASONAL ALLERGIC RHINITIS DUE TO POLLEN: ICD-10-CM

## 2020-08-10 PROCEDURE — 99214 OFFICE O/P EST MOD 30 MIN: CPT | Performed by: NURSE PRACTITIONER

## 2020-08-10 RX ORDER — LORATADINE 10 MG/1
10 TABLET ORAL DAILY
Qty: 90 TABLET | Refills: 1 | Status: SHIPPED | OUTPATIENT
Start: 2020-08-10

## 2020-08-10 RX ORDER — LISINOPRIL 20 MG/1
20 TABLET ORAL DAILY
Qty: 90 TABLET | Refills: 1 | Status: SHIPPED | OUTPATIENT
Start: 2020-08-10 | End: 2021-01-20 | Stop reason: SDUPTHER

## 2020-08-10 RX ORDER — LEVOTHYROXINE SODIUM 175 UG/1
175 TABLET ORAL DAILY
Qty: 90 TABLET | Refills: 1 | Status: SHIPPED | OUTPATIENT
Start: 2020-08-10 | End: 2020-12-08 | Stop reason: SDUPTHER

## 2020-08-10 RX ORDER — VENLAFAXINE HYDROCHLORIDE 75 MG/1
75 CAPSULE, EXTENDED RELEASE ORAL DAILY
Qty: 90 CAPSULE | Refills: 1 | Status: SHIPPED | OUTPATIENT
Start: 2020-08-10 | End: 2021-01-20 | Stop reason: SDUPTHER

## 2020-08-10 NOTE — PROGRESS NOTES
Subjective   Brady Mcknight is a 40 y.o. male.   Chief Compliant: The patient presents with Hypertension    2w    Anxiety   Presents for follow-up (Known depression and anxiety.  Feels he is doing better.  Feels stable with is current medications) visit. Symptoms include excessive worry and nervous/anxious behavior. Patient reports no chest pain, palpitations or shortness of breath. Symptoms occur most days. The severity of symptoms is mild. The quality of sleep is fair. Nighttime awakenings: occasional.       Allergies   This is a new problem. The current episode started 1 to 4 weeks ago. The problem occurs daily. The problem has been waxing and waning. Associated symptoms include fatigue. Pertinent negatives include no chest pain, headaches or neck pain. Associated symptoms comments: Runny nose and congestion  . Exacerbated by: Bike ride over 200 mile and noticed pollen in his beard when he returned home. Treatments tried: claritin. The treatment provided moderate relief.   Hypertension   This is a chronic problem. The problem is controlled. Associated symptoms include anxiety. Pertinent negatives include no blurred vision, chest pain, headaches, malaise/fatigue, neck pain, orthopnea, palpitations, peripheral edema, PND, shortness of breath or sweats. Risk factors for coronary artery disease include family history, obesity, sedentary lifestyle and stress. Past treatments include ACE inhibitors. Current antihypertension treatment includes nothing. Compliance problems include exercise and diet (lap banding).  Identifiable causes of hypertension include a thyroid problem.   Thyroid Problem   Presents for follow-up (Known Hypothyroidism) visit. Symptoms include anxiety and fatigue. Patient reports no palpitations. (Denies any symptoms  ) The symptoms have been stable.      The following portions of the patient's history were reviewed and updated as appropriate: allergies, current medications, past family history, past  "medical history, past social history, past surgical history and problem list.      Review of Systems   Constitutional: Positive for fatigue. Negative for activity change, appetite change and malaise/fatigue.   Eyes: Negative for blurred vision.   Respiratory: Negative.  Negative for shortness of breath.    Cardiovascular: Negative.  Negative for chest pain, palpitations, orthopnea, leg swelling and PND.   Musculoskeletal: Negative.  Negative for neck pain.   Skin: Negative.    Neurological: Negative for headaches.   Psychiatric/Behavioral: Positive for agitation (continues to follow with psych ). The patient is nervous/anxious.    All other systems reviewed and are negative.      Procedures    Vitals: Blood pressure 140/80, pulse 91, temperature 96.6 °F (35.9 °C), temperature source Temporal, height 175.3 cm (69\"), weight (!) 137 kg (303 lb), SpO2 99 %.     Allergies: No Known Allergies       Objective   Physical Exam   Constitutional: He is oriented to person, place, and time. He appears well-developed and well-nourished. No distress.   HENT:   Head: Normocephalic.   Right Ear: Hearing, tympanic membrane, external ear and ear canal normal.   Left Ear: Hearing and external ear normal. There is swelling and tenderness.   Nose: Nose normal. Right sinus exhibits no maxillary sinus tenderness and no frontal sinus tenderness. Left sinus exhibits no maxillary sinus tenderness and no frontal sinus tenderness.   Mouth/Throat: Oropharynx is clear and moist and mucous membranes are normal. No oropharyngeal exudate. No tonsillar exudate.   Eyes: Conjunctivae are normal. Right eye exhibits no discharge. Left eye exhibits no discharge.   Neck: Neck supple.   Cardiovascular: Normal rate, regular rhythm and normal heart sounds.   No murmur heard.  Pulmonary/Chest: Effort normal and breath sounds normal. No respiratory distress.   Neurological: He is alert and oriented to person, place, and time.   Skin: Skin is warm and dry. He is " not diaphoretic.        Psychiatric: He has a normal mood and affect. His behavior is normal. Judgment and thought content normal.   Nursing note and vitals reviewed.      During this visit the following were done:  Labs Reviewed []    Labs Ordered [x]    Radiology Reports Reviewed []    Radiology Ordered []    PCP Records Reviewed []    Referring Provider Records Reviewed []    ER Records Reviewed []    Hospital Records Reviewed []    History Obtained From Family []    Radiology Images Reviewed []    Other Reviewed []    Records Requested []      Brady was seen today for hypertension.    Diagnoses and all orders for this visit:    Benign essential HTN  -     lisinopril (PRINIVIL,ZESTRIL) 20 MG tablet; Take 1 tablet by mouth Daily.  -     Comprehensive Metabolic Panel; Future  -     Lipid Panel; Future  -     Magnesium; Future    Anxiety and depression  -     venlafaxine XR (EFFEXOR-XR) 75 MG 24 hr capsule; Take 1 capsule by mouth Daily.    Disease of thyroid gland  -     levothyroxine (SYNTHROID, LEVOTHROID) 175 MCG tablet; Take 1 tablet by mouth Daily.  -     TSH; Future    Seasonal allergic rhinitis due to pollen  -     loratadine (CLARITIN) 10 MG tablet; Take 1 tablet by mouth Daily.      Patient is a non smoker    Patient's Body mass index is 44.75 kg/m². BMI is above normal parameters. Recommendations include: exercise counseling and nutrition counseling.

## 2020-10-20 NOTE — TELEPHONE ENCOUNTER
Yuri was requesting a 90 day refill of the pts Sildenafil 20 mg. Pt has not been seen since Feb and no showed his appt on 08/25/20.     Sent this to the  to get the pt in for an appt and Dr Quinn can refill his meds for that at the appt.

## 2020-11-12 DIAGNOSIS — E29.1 HYPOGONADISM IN MALE: ICD-10-CM

## 2020-11-12 NOTE — TELEPHONE ENCOUNTER
Patient had prescription transferred from [facility] to [facility]. [facility] was having an issue filling the prescription. Patient is requesting a prescription sent to [facility].

## 2020-11-13 RX ORDER — TESTOSTERONE CYPIONATE 200 MG/ML
INJECTION, SOLUTION INTRAMUSCULAR
Qty: 10 ML | Refills: 2 | Status: SHIPPED | OUTPATIENT
Start: 2020-11-13 | End: 2021-02-11 | Stop reason: SDUPTHER

## 2020-12-08 DIAGNOSIS — E07.9 DISEASE OF THYROID GLAND: ICD-10-CM

## 2020-12-08 RX ORDER — LEVOTHYROXINE SODIUM 175 UG/1
175 TABLET ORAL DAILY
Qty: 90 TABLET | Refills: 1 | Status: SHIPPED | OUTPATIENT
Start: 2020-12-08

## 2020-12-28 ENCOUNTER — OFFICE VISIT (OUTPATIENT)
Dept: FAMILY MEDICINE CLINIC | Facility: CLINIC | Age: 40
End: 2020-12-28

## 2020-12-28 VITALS
DIASTOLIC BLOOD PRESSURE: 88 MMHG | TEMPERATURE: 97.3 F | HEIGHT: 69 IN | WEIGHT: 315 LBS | BODY MASS INDEX: 46.65 KG/M2 | HEART RATE: 92 BPM | SYSTOLIC BLOOD PRESSURE: 128 MMHG | OXYGEN SATURATION: 98 %

## 2020-12-28 DIAGNOSIS — M54.12 CERVICAL RADICULAR PAIN: Primary | ICD-10-CM

## 2020-12-28 DIAGNOSIS — E07.9 DISEASE OF THYROID GLAND: ICD-10-CM

## 2020-12-28 DIAGNOSIS — I10 BENIGN ESSENTIAL HTN: ICD-10-CM

## 2020-12-28 PROCEDURE — 80053 COMPREHEN METABOLIC PANEL: CPT | Performed by: NURSE PRACTITIONER

## 2020-12-28 PROCEDURE — 83735 ASSAY OF MAGNESIUM: CPT | Performed by: NURSE PRACTITIONER

## 2020-12-28 PROCEDURE — 84443 ASSAY THYROID STIM HORMONE: CPT | Performed by: NURSE PRACTITIONER

## 2020-12-28 PROCEDURE — 80061 LIPID PANEL: CPT | Performed by: NURSE PRACTITIONER

## 2020-12-28 PROCEDURE — 99214 OFFICE O/P EST MOD 30 MIN: CPT | Performed by: NURSE PRACTITIONER

## 2020-12-28 PROCEDURE — 96372 THER/PROPH/DIAG INJ SC/IM: CPT | Performed by: NURSE PRACTITIONER

## 2020-12-28 PROCEDURE — 36415 COLL VENOUS BLD VENIPUNCTURE: CPT | Performed by: NURSE PRACTITIONER

## 2020-12-28 RX ORDER — METHYLPREDNISOLONE 4 MG/1
TABLET ORAL
Qty: 1 EACH | Refills: 0 | Status: SHIPPED | OUTPATIENT
Start: 2020-12-28

## 2020-12-28 RX ORDER — KETOROLAC TROMETHAMINE 30 MG/ML
60 INJECTION, SOLUTION INTRAMUSCULAR; INTRAVENOUS ONCE
Status: COMPLETED | OUTPATIENT
Start: 2020-12-28 | End: 2020-12-28

## 2020-12-28 RX ORDER — DEXAMETHASONE SODIUM PHOSPHATE 4 MG/ML
8 INJECTION, SOLUTION INTRA-ARTICULAR; INTRALESIONAL; INTRAMUSCULAR; INTRAVENOUS; SOFT TISSUE ONCE
Status: COMPLETED | OUTPATIENT
Start: 2020-12-28 | End: 2020-12-28

## 2020-12-28 RX ADMIN — DEXAMETHASONE SODIUM PHOSPHATE 8 MG: 4 INJECTION, SOLUTION INTRA-ARTICULAR; INTRALESIONAL; INTRAMUSCULAR; INTRAVENOUS; SOFT TISSUE at 17:39

## 2020-12-28 RX ADMIN — KETOROLAC TROMETHAMINE 60 MG: 30 INJECTION, SOLUTION INTRAMUSCULAR; INTRAVENOUS at 17:40

## 2020-12-29 ENCOUNTER — TELEPHONE (OUTPATIENT)
Dept: FAMILY MEDICINE CLINIC | Facility: CLINIC | Age: 40
End: 2020-12-29

## 2020-12-29 LAB
ALBUMIN SERPL-MCNC: 4.4 G/DL (ref 3.5–5.2)
ALBUMIN/GLOB SERPL: 1.3 G/DL
ALP SERPL-CCNC: 76 U/L (ref 39–117)
ALT SERPL W P-5'-P-CCNC: 20 U/L (ref 1–41)
ANION GAP SERPL CALCULATED.3IONS-SCNC: 9.8 MMOL/L (ref 5–15)
AST SERPL-CCNC: 22 U/L (ref 1–40)
BILIRUB SERPL-MCNC: 0.7 MG/DL (ref 0–1.2)
BUN SERPL-MCNC: 8 MG/DL (ref 6–20)
BUN/CREAT SERPL: 9.3 (ref 7–25)
CALCIUM SPEC-SCNC: 9.5 MG/DL (ref 8.6–10.5)
CHLORIDE SERPL-SCNC: 102 MMOL/L (ref 98–107)
CHOLEST SERPL-MCNC: 178 MG/DL (ref 0–200)
CO2 SERPL-SCNC: 26.2 MMOL/L (ref 22–29)
CREAT SERPL-MCNC: 0.86 MG/DL (ref 0.76–1.27)
GFR SERPL CREATININE-BSD FRML MDRD: 98 ML/MIN/1.73
GLOBULIN UR ELPH-MCNC: 3.3 GM/DL
GLUCOSE SERPL-MCNC: 84 MG/DL (ref 65–99)
HDLC SERPL-MCNC: 68 MG/DL (ref 40–60)
LDLC SERPL CALC-MCNC: 93 MG/DL (ref 0–100)
LDLC/HDLC SERPL: 1.34 {RATIO}
MAGNESIUM SERPL-MCNC: 2.2 MG/DL (ref 1.6–2.6)
POTASSIUM SERPL-SCNC: 4.1 MMOL/L (ref 3.5–5.2)
PROT SERPL-MCNC: 7.7 G/DL (ref 6–8.5)
SODIUM SERPL-SCNC: 138 MMOL/L (ref 136–145)
TRIGL SERPL-MCNC: 94 MG/DL (ref 0–150)
TSH SERPL DL<=0.05 MIU/L-ACNC: 14.9 UIU/ML (ref 0.27–4.2)
VLDLC SERPL-MCNC: 17 MG/DL (ref 5–40)

## 2020-12-29 NOTE — TELEPHONE ENCOUNTER
----- Message from ALONDRA Li sent at 12/29/2020  7:49 AM EST -----  Synthroid needs adjustment but first ask patient if he has been taking his synthroid daily    Left message for patient to return call.    No he has been out about 2 weeks.

## 2020-12-30 DIAGNOSIS — E07.9 DISEASE OF THYROID GLAND: Primary | ICD-10-CM

## 2020-12-30 NOTE — TELEPHONE ENCOUNTER
Ok needs to take his synthroid consistent and repeat TSH in 4 weeks      Left a message to return call.      Patient notified & verbalized understanding.

## 2021-01-02 NOTE — PROGRESS NOTES
"Rosemary Mcknight is a 40 y.o. male.   Chief Compliant: The patient presents with Neck Pain (radiates into right arm)    Neck Pain   This is a new problem. The current episode started in the past 7 days. The problem occurs constantly. The problem has been gradually worsening. The pain is associated with nothing (awoke with numbness in right arm ). The pain is present in the right side. The quality of the pain is described as aching and cramping. The pain is at a severity of 6/10. The pain is moderate. The symptoms are aggravated by position (comfortable with hold ing arm over his head.  only rst is sittung up ). The pain is same all the time. Associated symptoms include numbness and tingling. He has tried ice, muscle relaxants, neck support, heat, bed rest and acetaminophen for the symptoms. The treatment provided no relief.   Hypertension  This is a chronic problem. The problem is controlled. Associated symptoms include neck pain. Identifiable causes of hypertension include a thyroid problem.   Thyroid Problem  Presents for follow-up (Hypothyroidism) visit. The symptoms have been stable.      The following portions of the patient's history were reviewed and updated as appropriate: allergies, current medications, past family history, past medical history, past social history, past surgical history and problem list.      Review of Systems   Constitutional: Positive for activity change.   HENT: Negative.    Respiratory: Negative.    Cardiovascular: Negative.    Musculoskeletal: Positive for neck pain.   Neurological: Positive for tingling and numbness.   All other systems reviewed and are negative.      Procedures    Vitals: Blood pressure 128/88, pulse 92, temperature 97.3 °F (36.3 °C), temperature source Temporal, height 175.3 cm (69\"), weight (!) 148 kg (326 lb 12.8 oz), SpO2 98 %.     Allergies: No Known Allergies       Objective   Physical Exam  Vitals signs and nursing note reviewed.   Constitutional:      "  Appearance: He is well-developed.   Neck:      Musculoskeletal: Decreased range of motion. Pain with movement, spinous process tenderness and muscular tenderness present.   Cardiovascular:      Rate and Rhythm: Normal rate and regular rhythm.      Heart sounds: Normal heart sounds. No murmur.   Pulmonary:      Effort: Pulmonary effort is normal.      Breath sounds: Normal breath sounds.   Skin:     General: Skin is warm and dry.   Neurological:      Mental Status: He is alert and oriented to person, place, and time.   Psychiatric:         Behavior: Behavior normal.         During this visit the following were done:  Labs Reviewed []    Labs Ordered []    Radiology Reports Reviewed []    Radiology Ordered []    PCP Records Reviewed []    Referring Provider Records Reviewed []    ER Records Reviewed []    Hospital Records Reviewed []    History Obtained From Family []    Radiology Images Reviewed []    Other Reviewed []    Records Requested []      Assessment/Plan   Discussed with patient impression and plan, patient verbalizes understanding  Diagnoses and all orders for this visit:    1. Cervical radicular pain (Primary)  -     MRI Cervical Spine Without Contrast; Future  -     methylPREDNISolone (MEDROL) 4 MG dose pack; Take as directed on package instructions.  Dispense: 1 each; Refill: 0  -     ketorolac (TORADOL) injection 60 mg  -     dexamethasone (DECADRON) injection 8 mg    2. Benign essential HTN  -     Comprehensive Metabolic Panel  -     Lipid Panel  -     Magnesium    3. Disease of thyroid gland  -     TSH

## 2021-01-07 ENCOUNTER — HOSPITAL ENCOUNTER (OUTPATIENT)
Dept: GENERAL RADIOLOGY | Facility: HOSPITAL | Age: 41
Discharge: HOME | End: 2021-01-07
Admitting: CHIROPRACTOR

## 2021-01-07 ENCOUNTER — TRANSCRIBE ORDERS (OUTPATIENT)
Dept: ADMINISTRATIVE | Facility: HOSPITAL | Age: 41
End: 2021-01-07

## 2021-01-07 DIAGNOSIS — M54.12 CERVICAL RADICULAR PAIN: Primary | ICD-10-CM

## 2021-01-07 DIAGNOSIS — M54.12 CERVICAL RADICULAR PAIN: ICD-10-CM

## 2021-01-07 PROCEDURE — 72050 X-RAY EXAM NECK SPINE 4/5VWS: CPT | Performed by: RADIOLOGY

## 2021-01-07 PROCEDURE — 72050 X-RAY EXAM NECK SPINE 4/5VWS: CPT

## 2021-01-11 ENCOUNTER — HOSPITAL ENCOUNTER (OUTPATIENT)
Dept: MRI IMAGING | Facility: HOSPITAL | Age: 41
Discharge: HOME | End: 2021-01-11
Admitting: NURSE PRACTITIONER

## 2021-01-11 DIAGNOSIS — M54.12 CERVICAL RADICULAR PAIN: ICD-10-CM

## 2021-01-11 DIAGNOSIS — M54.12 CERVICAL RADICULAR PAIN: Primary | ICD-10-CM

## 2021-01-11 PROCEDURE — 72141 MRI NECK SPINE W/O DYE: CPT

## 2021-01-11 PROCEDURE — 72141 MRI NECK SPINE W/O DYE: CPT | Performed by: RADIOLOGY

## 2021-01-12 ENCOUNTER — TELEPHONE (OUTPATIENT)
Dept: FAMILY MEDICINE CLINIC | Facility: CLINIC | Age: 41
End: 2021-01-12

## 2021-01-12 DIAGNOSIS — M54.12 CERVICAL RADICULAR PAIN: Primary | ICD-10-CM

## 2021-01-12 NOTE — TELEPHONE ENCOUNTER
----- Message from ALONDRA Li sent at 1/11/2021  5:27 PM EST -----  Bulging disc with stenosis I placed a referral to hospital pain management hopefully he will improve with injections    Left a message to return call.    Patient notified & verbalized understanding.

## 2021-01-15 ENCOUNTER — APPOINTMENT (OUTPATIENT)
Dept: MRI IMAGING | Facility: HOSPITAL | Age: 41
End: 2021-01-15

## 2021-01-20 DIAGNOSIS — I10 BENIGN ESSENTIAL HTN: ICD-10-CM

## 2021-01-20 DIAGNOSIS — F41.9 ANXIETY AND DEPRESSION: ICD-10-CM

## 2021-01-20 DIAGNOSIS — F32.A ANXIETY AND DEPRESSION: ICD-10-CM

## 2021-01-20 RX ORDER — LISINOPRIL 20 MG/1
20 TABLET ORAL DAILY
Qty: 90 TABLET | Refills: 1 | Status: SHIPPED | OUTPATIENT
Start: 2021-01-20 | End: 2021-08-16

## 2021-01-20 RX ORDER — VENLAFAXINE HYDROCHLORIDE 75 MG/1
75 CAPSULE, EXTENDED RELEASE ORAL DAILY
Qty: 90 CAPSULE | Refills: 1 | Status: SHIPPED | OUTPATIENT
Start: 2021-01-20 | End: 2021-08-16

## 2021-01-20 NOTE — TELEPHONE ENCOUNTER
Caller: [facility] [place] [phone] [place] [phone] [facility]    Relationship: Pharmacy      Medication needed:   Requested Prescriptions     Pending Prescriptions Disp Refills   • lisinopril (PRINIVIL,ZESTRIL) 20 MG tablet 90 tablet 1     Sig: Take 1 tablet by mouth Daily.   • venlafaxine XR (EFFEXOR-XR) 75 MG 24 hr capsule 90 capsule 1     Sig: Take 1 capsule by mouth Daily.       When do you need the refill by:TODAY    What details did the patient provide when requesting the medication:  HAS A COUPLE OF DAYS      Does the patient have less than a 3 day supply:  [x] Yes  [] No    What is the patient's preferred pharmacy:  [facility] [place] [phone] [place] [phone]

## 2021-02-01 ENCOUNTER — PRIOR AUTHORIZATION (OUTPATIENT)
Dept: UROLOGY | Facility: CLINIC | Age: 41
End: 2021-02-01

## 2021-02-11 ENCOUNTER — OFFICE VISIT (OUTPATIENT)
Dept: UROLOGY | Facility: CLINIC | Age: 41
End: 2021-02-11

## 2021-02-11 VITALS — TEMPERATURE: 97.1 F | BODY MASS INDEX: 46.65 KG/M2 | WEIGHT: 315 LBS | HEIGHT: 69 IN

## 2021-02-11 DIAGNOSIS — N42.9 DISORDER OF PROSTATE: ICD-10-CM

## 2021-02-11 DIAGNOSIS — E29.1 HYPOGONADISM IN MALE: Primary | ICD-10-CM

## 2021-02-11 DIAGNOSIS — E28.0 ESTRADIOL EXCESS: ICD-10-CM

## 2021-02-11 PROCEDURE — 99213 OFFICE O/P EST LOW 20 MIN: CPT | Performed by: UROLOGY

## 2021-02-11 PROCEDURE — 82670 ASSAY OF TOTAL ESTRADIOL: CPT | Performed by: UROLOGY

## 2021-02-11 PROCEDURE — 85027 COMPLETE CBC AUTOMATED: CPT | Performed by: UROLOGY

## 2021-02-11 PROCEDURE — 84403 ASSAY OF TOTAL TESTOSTERONE: CPT | Performed by: UROLOGY

## 2021-02-11 PROCEDURE — 84153 ASSAY OF PSA TOTAL: CPT | Performed by: UROLOGY

## 2021-02-11 RX ORDER — SILDENAFIL CITRATE 20 MG/1
TABLET ORAL
Qty: 30 TABLET | Refills: 6 | Status: SHIPPED | OUTPATIENT
Start: 2021-02-11

## 2021-02-11 RX ORDER — TESTOSTERONE CYPIONATE 200 MG/ML
INJECTION, SOLUTION INTRAMUSCULAR
Qty: 10 ML | Refills: 2 | Status: SHIPPED | OUTPATIENT
Start: 2021-02-11

## 2021-02-11 NOTE — PROGRESS NOTES
"Chief Complaint:          Chief Complaint   Patient presents with   • Hypogonadism       HPI:   40 y.o. male patient returns today for follow-up.  He is been on testosterone replacement therapy.  He reports a dramatic improvement in his Nazario questionnaire: -NAZARIO-androgen deficiency in the age male questionnaire  The patient was queried regarding the androgen deficiency in the age male questionnaire.  This is a validated questionnaire that was performed on a set of 314 [place] male physicians when it was positive it correlated directly with a 94% chance of low testosterone.  Patient indicates there is a decrease in libido or sex drive, a lack of energy, Decreased  strength and endurance, a decreased \"enjoyment of life\", sad and grumpy feelings with significant difficulty maintaining erections.  He is also been a recent deterioration regarding work performance.  He reports weight loss.  He has good facility and the use of subcutaneous and intramuscular injections as well as comfort level and using the medication in a sterile fashion.  He understands he should use only the prescribed dose.  He's here for appropriate lab monitoring regarding this.  He understands this is a controlled substance and therefore must be watched closely will not be refilled and the medical loss or miss calculation of the dose.  He is very happy with the treatment and therefore wants to continue it.  Has gained 30 pounds since he got .  He had a vasectomy and his wife is interested in the child that she is substantially younger we discussed ways of his ostomy at length    Past Medical History:        Past Medical History:   Diagnosis Date   • Anger    • Anxiety    • Arthritis    • Chronic back pain    • Depression    • Fatigue    • History of heart murmur in childhood    • Hyperlipidemia    • Hypertension    • Hypothyroidism    • Obesity    • Positive urine drug screen 03/2016   • Screening PSA (prostate specific antigen) 2014 " "        Current Meds:     Current Outpatient Medications   Medication Sig Dispense Refill   • anastrozole (ARIMIDEX) 1 MG tablet Take 1 tablet by mouth 1 (One) Time Per Week. FOR 12 WEEKS 12 tablet 3   • B-D 3CC LUER-ALBIN SYR 21GX1\" 21G X 1\" 3 ML misc   0   • BD DISP NEEDLES 20G X 1-1/2\" misc   0   • HYDROcodone-acetaminophen (NORCO) 5-325 MG per tablet 1 to 2 Tablets Every 6 Hours as Needed for PAIN 12 tablet 0   • levothyroxine (SYNTHROID, LEVOTHROID) 175 MCG tablet Take 1 tablet by mouth Daily. 90 tablet 1   • lisinopril (PRINIVIL,ZESTRIL) 20 MG tablet Take 1 tablet by mouth Daily. 90 tablet 1   • loratadine (CLARITIN) 10 MG tablet Take 1 tablet by mouth Daily. 90 tablet 1   • methylPREDNISolone (MEDROL) 4 MG dose pack Take as directed on package instructions. 1 each 0   • neomycin-polymyxin-hydrocortisone (CORTISPORIN) 1 % solution otic solution Administer 4 drops into the left ear 4 (Four) Times a Day. 10 mL 0   • promethazine (PHENERGAN) 25 MG tablet Take 1 tablet by mouth Every 8 (Eight) Hours As Needed for Nausea or Vomiting. 12 tablet 0   • Syringe, Disposable, 3 ML misc Use 3 ml syringe with a 25 gauge  5/8 inch needle 24 each 6   • Testosterone Cypionate (Depo-Testosterone) 200 MG/ML injection He is to use 1/2 cc every Monday and Thursday SQ 10 mL 2   • venlafaxine XR (EFFEXOR-XR) 75 MG 24 hr capsule Take 1 capsule by mouth Daily. 90 capsule 1     No current facility-administered medications for this visit.         Allergies:      No Known Allergies     Past Surgical History:     Past Surgical History:   Procedure Laterality Date   • ABDOMINAL SURGERY     • CHOLECYSTECTOMY     • CHOLECYSTECTOMY WITH INTRAOPERATIVE CHOLANGIOGRAM N/A 9/5/2017    Procedure: CHOLECYSTECTOMY LAPAROSCOPIC INTRAOPERATIVE CHOLANGIOGRAM;  Surgeon: Joni Dawn MD;  Location: [facility];  Service:    • COLONOSCOPY N/A 1/4/2018    Procedure: COLONOSCOPY CPT CODE: 67062;  Surgeon: Remington Burroughs III, MD;  Location: [facility]" OR;  Service:    • GASTRIC BANDING  08/21/2016   • VASECTOMY     • WISDOM TOOTH EXTRACTION           Social History:     Social History     Socioeconomic History   • Marital status:      Spouse name: Not on file   • Number of children: Not on file   • Years of education: Not on file   • Highest education level: Not on file   Occupational History   • Occupation:    Tobacco Use   • Smoking status: Former Smoker   • Smokeless tobacco: Current User     Types: Snuff   Substance and Sexual Activity   • Alcohol use: Yes     Alcohol/week: 30.0 standard drinks     Types: 30 Cans of beer per week     Comment: Drinks a 30 pack of beer and one bottle of whiskey  per week.   • Drug use: No   • Sexual activity: Defer       Family History:     Family History   Problem Relation Age of Onset   • Cancer Mother         breast   • Lymphoma Father    • Cancer Sister         lung        Review of Systems:     Review of Systems   Constitutional: Negative.    HENT: Negative.    Eyes: Negative.    Respiratory: Negative.    Cardiovascular: Negative.    Gastrointestinal: Negative.    Endocrine: Negative.    Musculoskeletal: Negative.    Allergic/Immunologic: Negative.    Neurological: Negative.    Hematological: Negative.    Psychiatric/Behavioral: Negative.        Physical Exam:     Physical Exam  Vitals signs and nursing note reviewed.   Constitutional:       Appearance: He is well-developed.   HENT:      Head: Normocephalic and atraumatic.   Eyes:      Conjunctiva/sclera: Conjunctivae normal.      Pupils: Pupils are equal, round, and reactive to light.   Neck:      Musculoskeletal: Normal range of motion.   Cardiovascular:      Rate and Rhythm: Normal rate and regular rhythm.      Heart sounds: Normal heart sounds.   Pulmonary:      Effort: Pulmonary effort is normal.      Breath sounds: Normal breath sounds.   Abdominal:      General: Bowel sounds are normal.      Palpations: Abdomen is soft.   Musculoskeletal:  Normal range of motion.   Skin:     General: Skin is warm and dry.   Neurological:      Mental Status: He is alert and oriented to person, place, and time.      Deep Tendon Reflexes: Reflexes are normal and symmetric.   Psychiatric:         Behavior: Behavior normal.         Thought Content: Thought content normal.         Judgment: Judgment normal.         I have reviewed the following portions of the patient's history: allergies, current medications, past family history, past medical history, past social history, past surgical history, problem list and ROS and confirm it's accurate.      Procedure:       Assessment/Plan:   Low testosterone:  patient is here for follow-up.  Since beginning the medication he's been very pleased.  He reports a dramatic improvement in his erections, ability to achieve and maintain an erection, improvement in libido, increase in frequency of morning erections, a noticeable weight loss consistent with the treatment.  No development of breast problems or abnormalities.  He's going to have appropriate safety laboratory parameters checked.   He understands that the new data implicates testosterone with the development of prostate cancer and this is all but been disproven and the medical literature as well as the risks of cardiovascular disease which is actually also been disproven.  He understands that while he is a candidate for topical therapy if he is in contact with children this is not an option because it's been shown to accentuate genitalia development at an early age that this frequently irreversible.  He also understands this is a controlled substance and as such will not be prescribed without appropriate follow-up and appropriate laboratory investigation.  He understands effects on spermatogenesis including the fact that this is not always completely reversible and not always completely limited his ability to father a child.  He has demonstrated facility in the technique of both  intramuscular and subcutaneous injection.  And has been taught sterility one drawing up the medication.  Azoospermia-discussed vasovasotomy            Patient's Body mass index is 49.62 kg/m². BMI is above normal parameters. Recommendations include: educational material.              This document has been electronically signed by DIANE WELCH MD February 11, 2021 14:31 EST

## 2021-02-12 PROBLEM — N46.01 INFERTILITY DUE TO AZOOSPERMIA: Status: ACTIVE | Noted: 2021-02-12

## 2021-02-12 LAB
DEPRECATED RDW RBC AUTO: 41.5 FL (ref 37–54)
ERYTHROCYTE [DISTWIDTH] IN BLOOD BY AUTOMATED COUNT: 12.9 % (ref 12.3–15.4)
ESTRADIOL SERPL HS-MCNC: 6.8 PG/ML
HCT VFR BLD AUTO: 46.4 % (ref 37.5–51)
HGB BLD-MCNC: 15.6 G/DL (ref 13–17.7)
MCH RBC QN AUTO: 29.9 PG (ref 26.6–33)
MCHC RBC AUTO-ENTMCNC: 33.6 G/DL (ref 31.5–35.7)
MCV RBC AUTO: 89.1 FL (ref 79–97)
PLATELET # BLD AUTO: 280 10*3/MM3 (ref 140–450)
PMV BLD AUTO: 10.3 FL (ref 6–12)
PSA SERPL-MCNC: 0.35 NG/ML (ref 0–4)
RBC # BLD AUTO: 5.21 10*6/MM3 (ref 4.14–5.8)
TESTOST SERPL-MCNC: 117 NG/DL (ref 249–836)
WBC # BLD AUTO: 7.01 10*3/MM3 (ref 3.4–10.8)

## 2021-03-18 ENCOUNTER — BULK ORDERING (OUTPATIENT)
Dept: CASE MANAGEMENT | Facility: OTHER | Age: 41
End: 2021-03-18

## 2021-03-18 DIAGNOSIS — Z23 IMMUNIZATION DUE: ICD-10-CM

## 2021-06-28 DIAGNOSIS — F41.9 ANXIETY AND DEPRESSION: Primary | ICD-10-CM

## 2021-06-28 DIAGNOSIS — F32.A ANXIETY AND DEPRESSION: Primary | ICD-10-CM

## 2021-07-12 NOTE — INTERVAL H&P NOTE
H&P reviewed. The patient was examined and there are no changes to the H&P.       [facility] Nurse Triage Telephone Note    Provider: Lamar Keating NP  Facility: [facility]   Facility Type:  [facility]    Caller: Dori  Call Back Number: [phone]    Allergies   Allergen Reactions     Diphenhydramine Palpitations     Tolerated IV Benadryl when not pushed too fast     Isosorbide Other (See Comments) and Dizziness     Also causes syncope (has fallen before) and brain fog/mental disturbances - please do not prescribe     Nitroglycerin Dizziness and Fatigue     Specifically the patch - please do not prescribe     Contrast Dye Hives and Itching     Adhesive Tape Rash     Diphenhydramine Hcl Palpitations     Liquid Adhesive Itching     Nystatin Dermatitis     Nystatin (Topical) Dermatitis     Also blisters     Penicillins Swelling and Rash     Occurred as a child - not 100% sure on specific reactions     Sulfa Drugs Other (See Comments)     Occurred as a child / patient does not remember specific reaction       Reason for call: Nurse calling to report monthly orthostatic BP recorded today. Layin/74 HR 78; Standin/46 HR 65. Pt did not c/o dizziness or lightheadedness. Is currently on Olanzapine 2.5mg every day, Seroquel 50mg TID for psychosis - no behaviors have been charted on as of recently and nurse states pt is 'pleasant' when interacted with. No recent falls.    Verbal Order/Direction given by Provider: Report results and assessment finding to psych for potential dose adjustment.    Provider giving Order:  Lamar Keating NP    Verbal Order given to: Dori - pollo Ba RN

## 2021-07-30 DIAGNOSIS — E07.9 DISEASE OF THYROID GLAND: ICD-10-CM

## 2021-07-30 RX ORDER — LEVOTHYROXINE SODIUM 175 UG/1
175 TABLET ORAL DAILY
Qty: 90 TABLET | Refills: 1 | OUTPATIENT
Start: 2021-07-30

## 2021-08-13 DIAGNOSIS — E07.9 DISEASE OF THYROID GLAND: ICD-10-CM

## 2021-08-13 DIAGNOSIS — F41.9 ANXIETY AND DEPRESSION: ICD-10-CM

## 2021-08-13 DIAGNOSIS — F32.A ANXIETY AND DEPRESSION: ICD-10-CM

## 2021-08-13 DIAGNOSIS — I10 BENIGN ESSENTIAL HTN: ICD-10-CM

## 2021-08-13 NOTE — TELEPHONE ENCOUNTER
Caller: [facility] [address] [facility] [phone] [place] [phone] FX    Relationship: Pharmacy    Medication needed:   Requested Prescriptions     Pending Prescriptions Disp Refills   • levothyroxine (SYNTHROID, LEVOTHROID) 175 MCG tablet 90 tablet 1     Sig: Take 1 tablet by mouth Daily.       When do you need the refill by: TODAY    What additional details did the patient provide when requesting the medication: OUT OF MEDS    Does the patient have less than a 3 day supply:  [x] Yes  [] No    What is the patient's preferred pharmacy: [facility] [address] [facility] [phone] [place] [phone] FX<br>[facility] [address] [phone] [facility] [phone] FX

## 2021-08-16 RX ORDER — VENLAFAXINE HYDROCHLORIDE 75 MG/1
CAPSULE, EXTENDED RELEASE ORAL
Qty: 90 CAPSULE | Refills: 0 | Status: SHIPPED | OUTPATIENT
Start: 2021-08-16

## 2021-08-16 RX ORDER — LISINOPRIL 20 MG/1
TABLET ORAL
Qty: 90 TABLET | Refills: 0 | Status: SHIPPED | OUTPATIENT
Start: 2021-08-16

## 2021-08-18 RX ORDER — LEVOTHYROXINE SODIUM 175 UG/1
175 TABLET ORAL DAILY
Qty: 90 TABLET | Refills: 1 | OUTPATIENT
Start: 2021-08-18

## 2022-08-22 ENCOUNTER — OFFICE VISIT (OUTPATIENT)
Dept: UROLOGY | Facility: CLINIC | Age: 42
End: 2022-08-22

## 2022-08-22 VITALS — WEIGHT: 315 LBS | BODY MASS INDEX: 46.65 KG/M2 | HEIGHT: 69 IN

## 2022-08-22 DIAGNOSIS — R79.89 LOW TESTOSTERONE IN MALE: Primary | ICD-10-CM

## 2022-08-22 DIAGNOSIS — N42.9 DISORDER OF PROSTATE: ICD-10-CM

## 2022-08-22 PROCEDURE — 84153 ASSAY OF PSA TOTAL: CPT | Performed by: UROLOGY

## 2022-08-22 PROCEDURE — 84403 ASSAY OF TOTAL TESTOSTERONE: CPT | Performed by: UROLOGY

## 2022-08-22 PROCEDURE — 82670 ASSAY OF TOTAL ESTRADIOL: CPT | Performed by: UROLOGY

## 2022-08-22 PROCEDURE — 99213 OFFICE O/P EST LOW 20 MIN: CPT | Performed by: UROLOGY

## 2022-08-22 PROCEDURE — 85027 COMPLETE CBC AUTOMATED: CPT | Performed by: UROLOGY

## 2022-08-22 NOTE — PROGRESS NOTES
"Chief Complaint:      Chief Complaint   Patient presents with   • low testostrone        HPI:   42 y.o. male accompanied his by his wife not doing well has no libido he has not been on medication he can have testosterone he had a vas reversal by Dr. Hernandez 10 months ago completely unsuccessful he is on Suboxone.  I am going initiate a work-up I Paty continue the Clomid at this time.  I will see him back in 3 months pending the results of his labs    Past Medical History:     Past Medical History:   Diagnosis Date   • Anger    • Anxiety    • Arthritis    • Chronic back pain    • Depression    • Fatigue    • History of heart murmur in childhood    • Hyperlipidemia    • Hypertension    • Hypothyroidism    • Obesity    • Positive urine drug screen 03/2016   • Screening PSA (prostate specific antigen) 2014       Current Meds:     Current Outpatient Medications   Medication Sig Dispense Refill   • anastrozole (ARIMIDEX) 1 MG tablet Take 1 tablet by mouth 1 (One) Time Per Week. FOR 12 WEEKS 12 tablet 3   • B-D 3CC LUER-ALBIN SYR 21GX1\" 21G X 1\" 3 ML misc   0   • BD DISP NEEDLES 20G X 1-1/2\" misc   0   • HYDROcodone-acetaminophen (NORCO) 5-325 MG per tablet 1 to 2 Tablets Every 6 Hours as Needed for PAIN 12 tablet 0   • levothyroxine (SYNTHROID, LEVOTHROID) 175 MCG tablet Take 1 tablet by mouth Daily. 90 tablet 1   • lisinopril (PRINIVIL,ZESTRIL) 20 MG tablet TAKE ONE TABLET BY MOUTH EVERY DAY FOR BLOOD PRESSURE 90 tablet 0   • loratadine (CLARITIN) 10 MG tablet Take 1 tablet by mouth Daily. 90 tablet 1   • methylPREDNISolone (MEDROL) 4 MG dose pack Take as directed on package instructions. 1 each 0   • neomycin-polymyxin-hydrocortisone (CORTISPORIN) 1 % solution otic solution Administer 4 drops into the left ear 4 (Four) Times a Day. 10 mL 0   • promethazine (PHENERGAN) 25 MG tablet Take 1 tablet by mouth Every 8 (Eight) Hours As Needed for Nausea or Vomiting. 12 tablet 0   • sildenafil (REVATIO) 20 MG tablet May take up " to 5 by mouth one hour prior to intercourse on an empty stomach 30 tablet 6   • Syringe, Disposable, 3 ML misc Use 3 ml syringe with a 25 gauge  5/8 inch needle 24 each 6   • Testosterone Cypionate (Depo-Testosterone) 200 MG/ML injection He is to use 1/2 cc every Monday and Thursday SQ 10 mL 2   • venlafaxine XR (EFFEXOR-XR) 75 MG 24 hr capsule TAKE ONE CAPSULE BY MOUTH EVERY DAY AS DIRECTED FOR NERVES 90 capsule 0     No current facility-administered medications for this visit.        Allergies:      No Known Allergies     Past Surgical History:     Past Surgical History:   Procedure Laterality Date   • ABDOMINAL SURGERY     • CHOLECYSTECTOMY     • CHOLECYSTECTOMY WITH INTRAOPERATIVE CHOLANGIOGRAM N/A 9/5/2017    Procedure: CHOLECYSTECTOMY LAPAROSCOPIC INTRAOPERATIVE CHOLANGIOGRAM;  Surgeon: Joni Dawn MD;  Location: [facility];  Service:    • COLONOSCOPY N/A 1/4/2018    Procedure: COLONOSCOPY CPT CODE: 99465;  Surgeon: Remington Burroughs III, MD;  Location: [facility];  Service:    • GASTRIC BANDING  08/21/2016   • VASECTOMY     • WISDOM TOOTH EXTRACTION         Social History:     Social History     Socioeconomic History   • Marital status:    Tobacco Use   • Smoking status: Former Smoker   • Smokeless tobacco: Current User     Types: Snuff   Substance and Sexual Activity   • Alcohol use: Yes     Alcohol/week: 30.0 standard drinks     Types: 30 Cans of beer per week     Comment: Drinks a 30 pack of beer and one bottle of whiskey  per week.   • Drug use: No   • Sexual activity: Defer       Family History:     Family History   Problem Relation Age of Onset   • Cancer Mother         breast   • Lymphoma Father    • Cancer Sister         lung        Review of Systems:     Review of Systems   Constitutional: Negative.    HENT: Negative.    Eyes: Negative.    Respiratory: Negative.    Cardiovascular: Negative.    Gastrointestinal: Negative.    Endocrine: Negative.    Musculoskeletal: Negative.     Allergic/Immunologic: Negative.    Neurological: Negative.    Hematological: Negative.    Psychiatric/Behavioral: Negative.        Physical Exam:     Physical Exam  Vitals and nursing note reviewed.   Constitutional:       Appearance: He is well-developed.   HENT:      Head: Normocephalic and atraumatic.   Eyes:      Conjunctiva/sclera: Conjunctivae normal.      Pupils: Pupils are equal, round, and reactive to light.   Cardiovascular:      Rate and Rhythm: Normal rate and regular rhythm.      Heart sounds: Normal heart sounds.   Pulmonary:      Effort: Pulmonary effort is normal.      Breath sounds: Normal breath sounds.   Abdominal:      General: Bowel sounds are normal.      Palpations: Abdomen is soft.   Musculoskeletal:         General: Normal range of motion.      Cervical back: Normal range of motion.   Skin:     General: Skin is warm and dry.   Neurological:      Mental Status: He is alert and oriented to person, place, and time.      Deep Tendon Reflexes: Reflexes are normal and symmetric.   Psychiatric:         Behavior: Behavior normal.         Thought Content: Thought content normal.         Judgment: Judgment normal.         I have reviewed the following portions of the patient's history: Allergies, current medications, past family history, past medical history, past social history, past surgical history, problem list, and ROS and confirm it is accurate.    Recent Image (CT and/or KUB):      CT Abdomen and Pelvis: No results found for this or any previous visit.       CT Stone Protocol: No results found for this or any previous visit.       KUB: Results for orders placed in visit on 11/13/17    XR Abdomen KUB    Narrative  XR ABDOMEN KUB-    REASON FOR EXAM:  Diarrhea; R19.7-Diarrhea, unspecified;  R10.9-Unspecified abdominal pain.    Today's exam is compared with a previous study done in 2012.    A Lap Band is now in place. The orientation is appropriate in the  epigastric area. The access port is in  the right midabdomen. There are  clips in the right upper quadrant from previous cholecystectomy. The  bowel gas pattern shows no evidence of obstruction. There were no soft  tissue masses.    Impression  Nonspecific supine view of the abdomen.    This report was finalized on 11/13/2017 1:42 PM by Dr. Aristides Durham II, MD.       Labs (past 3 months):      No visits with results within 3 Month(s) from this visit.   Latest known visit with results is:   Office Visit on 02/11/2021   Component Date Value Ref Range Status   • WBC 02/11/2021 7.01  3.40 - 10.80 10*3/mm3 Final   • RBC 02/11/2021 5.21  4.14 - 5.80 10*6/mm3 Final   • Hemoglobin 02/11/2021 15.6  13.0 - 17.7 g/dL Final   • Hematocrit 02/11/2021 46.4  37.5 - 51.0 % Final   • MCV 02/11/2021 89.1  79.0 - 97.0 fL Final   • MCH 02/11/2021 29.9  26.6 - 33.0 pg Final   • MCHC 02/11/2021 33.6  31.5 - 35.7 g/dL Final   • RDW 02/11/2021 12.9  12.3 - 15.4 % Final   • RDW-SD 02/11/2021 41.5  37.0 - 54.0 fl Final   • MPV 02/11/2021 10.3  6.0 - 12.0 fL Final   • Platelets 02/11/2021 280  140 - 450 10*3/mm3 Final   • PSA 02/11/2021 0.346  0.000 - 4.000 ng/mL Final   • Testosterone, Total 02/11/2021 117.00 (A) 249.00 - 836.00 ng/dL Final   • Estradiol 02/11/2021 6.8  pg/mL Final        Procedure:       Assessment/Plan:   Infertility-status post a vasovasostomy and history apparently unsuccessful.  He is has a azoospermia.  He cannot be on testosterone I refilled Clomid pending the results of his labs.                This document has been electronically signed by DIANE WELCH MD August 22, 2022 13:20 EDT    Dictated Utilizing Dragon Dictation: Part of this note may be an electronic transcription/translation of spoken language to printed text using the Dragon Dictation System.

## 2022-08-23 LAB
DEPRECATED RDW RBC AUTO: 39.6 FL (ref 37–54)
ERYTHROCYTE [DISTWIDTH] IN BLOOD BY AUTOMATED COUNT: 12.5 % (ref 12.3–15.4)
ESTRADIOL SERPL HS-MCNC: 22.1 PG/ML
HCT VFR BLD AUTO: 41.5 % (ref 37.5–51)
HGB BLD-MCNC: 14 G/DL (ref 13–17.7)
MCH RBC QN AUTO: 29.5 PG (ref 26.6–33)
MCHC RBC AUTO-ENTMCNC: 33.7 G/DL (ref 31.5–35.7)
MCV RBC AUTO: 87.6 FL (ref 79–97)
PLATELET # BLD AUTO: 309 10*3/MM3 (ref 140–450)
PMV BLD AUTO: 9.9 FL (ref 6–12)
PSA SERPL-MCNC: 0.28 NG/ML (ref 0–4)
RBC # BLD AUTO: 4.74 10*6/MM3 (ref 4.14–5.8)
TESTOST SERPL-MCNC: 92 NG/DL (ref 249–836)
WBC NRBC COR # BLD: 8.87 10*3/MM3 (ref 3.4–10.8)

## 2022-09-28 ENCOUNTER — TELEPHONE (OUTPATIENT)
Dept: UROLOGY | Facility: CLINIC | Age: 42
End: 2022-09-28

## 2023-06-01 DIAGNOSIS — R79.89 LOW TESTOSTERONE IN MALE: Primary | ICD-10-CM

## 2023-06-08 PROCEDURE — 84403 ASSAY OF TOTAL TESTOSTERONE: CPT | Performed by: UROLOGY

## 2023-06-08 PROCEDURE — 85027 COMPLETE CBC AUTOMATED: CPT | Performed by: UROLOGY

## 2023-06-08 PROCEDURE — 84153 ASSAY OF PSA TOTAL: CPT | Performed by: UROLOGY

## 2023-06-08 PROCEDURE — 82670 ASSAY OF TOTAL ESTRADIOL: CPT | Performed by: UROLOGY

## 2023-09-19 ENCOUNTER — OFFICE VISIT (OUTPATIENT)
Dept: CARDIOLOGY | Facility: CLINIC | Age: 43
End: 2023-09-19
Payer: COMMERCIAL

## 2023-09-19 VITALS
SYSTOLIC BLOOD PRESSURE: 125 MMHG | DIASTOLIC BLOOD PRESSURE: 83 MMHG | HEART RATE: 101 BPM | WEIGHT: 315 LBS | OXYGEN SATURATION: 98 % | HEIGHT: 69 IN | BODY MASS INDEX: 46.65 KG/M2

## 2023-09-19 DIAGNOSIS — R06.09 DYSPNEA ON EXERTION: Primary | ICD-10-CM

## 2023-09-19 DIAGNOSIS — E66.01 MORBID OBESITY WITH BMI OF 50.0-59.9, ADULT: ICD-10-CM

## 2023-09-19 DIAGNOSIS — E87.5 SERUM POTASSIUM ELEVATED: ICD-10-CM

## 2023-09-19 DIAGNOSIS — Z72.0 SMOKELESS TOBACCO USE: ICD-10-CM

## 2023-09-19 DIAGNOSIS — I10 BENIGN ESSENTIAL HTN: ICD-10-CM

## 2023-09-19 PROCEDURE — 99214 OFFICE O/P EST MOD 30 MIN: CPT | Performed by: NURSE PRACTITIONER

## 2023-09-19 PROCEDURE — 3074F SYST BP LT 130 MM HG: CPT | Performed by: NURSE PRACTITIONER

## 2023-09-19 PROCEDURE — 1159F MED LIST DOCD IN RCRD: CPT | Performed by: NURSE PRACTITIONER

## 2023-09-19 PROCEDURE — 3079F DIAST BP 80-89 MM HG: CPT | Performed by: NURSE PRACTITIONER

## 2023-09-19 PROCEDURE — 1160F RVW MEDS BY RX/DR IN RCRD: CPT | Performed by: NURSE PRACTITIONER

## 2023-09-19 PROCEDURE — 93000 ELECTROCARDIOGRAM COMPLETE: CPT | Performed by: NURSE PRACTITIONER

## 2023-09-19 RX ORDER — CETIRIZINE HYDROCHLORIDE 10 MG/1
10 TABLET ORAL DAILY
COMMUNITY

## 2023-09-19 NOTE — PROGRESS NOTES
Subjective     Brady Mcknight is a 43 y.o. male.   Chief Complaint   Patient presents with    Establish Care     SOB, Possible Blockage because of hypertension per pcp     History of Present Illness   Brady Mcknight is a 43-year-old male who presents to clinic today as a new patient for cardiology evaluation.  He is accompanied by his spouse, Reshma.     He was referred by his PCP Jaclyn Kitchen for cardiac evaluation.  He has noticed intermittent exertional dyspnea, he denies chest pain.  His blood pressure was elevated and lisinopril was reinitiated.  He reports since going back on lisinopril 20 mg daily his blood pressure has improved and is around 125/80.  He denies history of hyperlipidemia or DM.  He does use smokeless tobacco.  He does use alcohol.  He does have underlying thyroid disease with recent changes and adjustments in his medication recommended however he has not picked up new Rx from pharmacy.  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    Serum potassium elevated    Morbid obesity with BMI of 50.0-59.9, adult    Smokeless tobacco use     Past Medical History:   Diagnosis Date    Anger     Anxiety     Arthritis     Chronic back pain     Depression     Fatigue     History of heart murmur in childhood     Hyperlipidemia     Hypertension     Hypothyroidism     Obesity     Positive urine drug screen 03/2016    Screening PSA (prostate specific antigen) 2014     Past Surgical History:   Procedure Laterality Date    ABDOMINAL SURGERY      CHOLECYSTECTOMY      CHOLECYSTECTOMY WITH INTRAOPERATIVE CHOLANGIOGRAM N/A 9/5/2017    Procedure: CHOLECYSTECTOMY LAPAROSCOPIC INTRAOPERATIVE CHOLANGIOGRAM;  Surgeon: Joni  "Escobar Dawn MD;  Location:  [facility] OR;  Service:     COLONOSCOPY N/A 1/4/2018    Procedure: COLONOSCOPY CPT CODE: 68683;  Surgeon: Remington Burroughs III, MD;  Location: [facility] OR;  Service:     GASTRIC BANDING  08/21/2016    VASECTOMY      WISDOM TOOTH EXTRACTION         Family History   Problem Relation Age of Onset    Cancer Mother         breast    Lymphoma Father     Cancer Sister         lung      Social History     Tobacco Use    Smoking status: Former    Smokeless tobacco: Current     Types: Snuff   Substance Use Topics    Alcohol use: Yes     Alcohol/week: 30.0 standard drinks     Types: 30 Cans of beer per week     Comment: Drinks a 30 pack of beer and one bottle of whiskey  per week.    Drug use: No         The following portions of the patient's history were reviewed and updated as appropriate: allergies, current medications, past family history, past medical history, past social history, past surgical history and problem list.    No Known Allergies      Current Outpatient Medications:     B-D 3CC LUER-ALBIN SYR 21GX1\" 21G X 1\" 3 ML misc, , Disp: , Rfl: 0    BD DISP NEEDLES 20G X 1-1/2\" misc, , Disp: , Rfl: 0    cetirizine (zyrTEC) 10 MG tablet, Take 1 tablet by mouth Daily., Disp: , Rfl:     levothyroxine (SYNTHROID, LEVOTHROID) 175 MCG tablet, Take 1 tablet by mouth Daily. (Patient taking differently: Take 150 mcg by mouth Daily.), Disp: 90 tablet, Rfl: 1    lisinopril (PRINIVIL,ZESTRIL) 20 MG tablet, TAKE ONE TABLET BY MOUTH EVERY DAY FOR BLOOD PRESSURE, Disp: 90 tablet, Rfl: 0    Syringe, Disposable, 3 ML misc, Use 3 ml syringe with a 25 gauge  5/8 inch needle, Disp: 24 each, Rfl: 6    Testosterone Cypionate (Depo-Testosterone) 200 MG/ML injection, He is to use 1/2 cc every Monday and Thursday SQ, Disp: 10 mL, Rfl: 2    venlafaxine XR (EFFEXOR-XR) 75 MG 24 hr capsule, TAKE ONE CAPSULE BY MOUTH EVERY DAY AS DIRECTED FOR NERVES (Patient taking differently: Take 2 capsules by mouth Daily.), Disp: 90 " "capsule, Rfl: 0    Review of Systems   Constitutional:  Negative for activity change, appetite change, chills, diaphoresis, fatigue and fever.   HENT:  Negative for congestion, drooling, ear discharge, ear pain, mouth sores, nosebleeds, postnasal drip, rhinorrhea, sinus pressure, sneezing and sore throat.    Eyes:  Negative for pain, discharge and visual disturbance.   Respiratory:  Positive for shortness of breath. Negative for cough, chest tightness and wheezing.    Cardiovascular:  Negative for chest pain, palpitations and leg swelling.   Gastrointestinal:  Negative for abdominal pain, constipation, diarrhea, nausea and vomiting.   Endocrine: Negative for cold intolerance, heat intolerance, polydipsia, polyphagia and polyuria.   Musculoskeletal:  Negative for arthralgias, myalgias and neck pain.   Skin:  Negative for rash and wound.   Neurological:  Negative for dizziness, syncope, speech difficulty, weakness, light-headedness and headaches.   Hematological:  Negative for adenopathy. Does not bruise/bleed easily.   Psychiatric/Behavioral:  Negative for confusion, dysphoric mood and sleep disturbance. The patient is not nervous/anxious.    All other systems reviewed and are negative.    /83 (BP Location: Right arm, Patient Position: Sitting, Cuff Size: Adult)   Pulse 101   Ht 175.3 cm (69.02\")   Wt (!) 154 kg (338 lb 12.8 oz)   SpO2 98%   BMI 50.00 kg/m²     Objective   No Known Allergies    Physical Exam  Vitals reviewed.   Constitutional:       Appearance: Normal appearance. He is well-developed. He is morbidly obese.   HENT:      Head: Normocephalic.   Eyes:      Conjunctiva/sclera: Conjunctivae normal.   Neck:      Thyroid: No thyromegaly.      Vascular: No carotid bruit or JVD.   Cardiovascular:      Rate and Rhythm: Regular rhythm. Tachycardia present.   Pulmonary:      Effort: Pulmonary effort is normal.      Breath sounds: Normal breath sounds.   Musculoskeletal:      Cervical back: Neck supple. "      Right lower leg: No edema.      Left lower leg: No edema.   Skin:     General: Skin is warm and dry.   Neurological:      Mental Status: He is alert and oriented to person, place, and time.   Psychiatric:         Attention and Perception: Attention normal.         Mood and Affect: Mood normal.         Speech: Speech normal.         Behavior: Behavior normal. Behavior is cooperative.         Cognition and Memory: Cognition normal.         ECG 12 Lead    Date/Time: 9/19/2023 11:06 AM  Performed by: Kristen Liao APRN  Authorized by: Kristen Liao APRN   Comparison: compared with previous ECG   Similar to previous ECG  Rhythm: sinus rhythm  Rate: normal  BPM: 97    Clinical impression: normal ECG  Comments: QT/QTc - 335/390        LABS  WBC   Date Value Ref Range Status   06/08/2023 8.89 3.40 - 10.80 10*3/mm3 Final     RBC   Date Value Ref Range Status   06/08/2023 4.95 4.14 - 5.80 10*6/mm3 Final     Hemoglobin   Date Value Ref Range Status   06/08/2023 14.6 13.0 - 17.7 g/dL Final     Hematocrit   Date Value Ref Range Status   06/08/2023 44.1 37.5 - 51.0 % Final     MCV   Date Value Ref Range Status   06/08/2023 89.1 79.0 - 97.0 fL Final     MCH   Date Value Ref Range Status   06/08/2023 29.5 26.6 - 33.0 pg Final     MCHC   Date Value Ref Range Status   06/08/2023 33.1 31.5 - 35.7 g/dL Final     RDW   Date Value Ref Range Status   06/08/2023 14.1 12.3 - 15.4 % Final     RDW-SD   Date Value Ref Range Status   06/08/2023 46.2 37.0 - 54.0 fl Final     MPV   Date Value Ref Range Status   06/08/2023 10.4 6.0 - 12.0 fL Final     Platelets   Date Value Ref Range Status   06/08/2023 342 140 - 450 10*3/mm3 Final     Neutrophil %   Date Value Ref Range Status   11/13/2018 64.9 30.0 - 70.0 % Final     Lymphocyte %   Date Value Ref Range Status   11/13/2018 23.1 21.0 - 51.0 % Final     Monocyte %   Date Value Ref Range Status   11/13/2018 9.3 0.0 - 10.0 % Final     Eosinophil %   Date Value Ref Range Status    11/13/2018 2.1 0.0 - 5.0 % Final     Basophil %   Date Value Ref Range Status   11/13/2018 0.3 0.0 - 2.0 % Final     Immature Grans %   Date Value Ref Range Status   11/13/2018 0.3 0.0 - 0.5 % Final     Neutrophils, Absolute   Date Value Ref Range Status   11/13/2018 6.33 1.40 - 6.50 10*3/mm3 Final     Lymphocytes, Absolute   Date Value Ref Range Status   11/13/2018 2.26 1.00 - 3.00 10*3/mm3 Final     Monocytes, Absolute   Date Value Ref Range Status   11/13/2018 0.91 (H) 0.10 - 0.90 10*3/mm3 Final     Eosinophils, Absolute   Date Value Ref Range Status   11/13/2018 0.21 0.00 - 0.70 10*3/mm3 Final     Basophils, Absolute   Date Value Ref Range Status   11/13/2018 0.03 0.00 - 0.30 10*3/mm3 Final     Immature Grans, Absolute   Date Value Ref Range Status   11/13/2018 0.03 0.00 - 0.03 10*3/mm3 Final     Total Cholesterol   Date Value Ref Range Status   12/28/2020 178 0 - 200 mg/dL Final     Triglycerides   Date Value Ref Range Status   12/28/2020 94 0 - 150 mg/dL Final     HDL Cholesterol   Date Value Ref Range Status   12/28/2020 68 (H) 40 - 60 mg/dL Final     LDL Cholesterol    Date Value Ref Range Status   12/28/2020 93 0 - 100 mg/dL Final             Assessment & Plan   Diagnoses and all orders for this visit:    1. Dyspnea on exertion (Primary)  -     ECG 12 Lead  -     Adult Transthoracic Echo Complete W/ Cont if Necessary Per Protocol; Future  -     Stress Test With Myocardial Perfusion (1 Day); Future  EKG reviewed and discussed, no acute changes  Ischemic work-up will be arranged  Recommend daily aspirin  Advised if new or worsening symptoms following work-up, go to the ER.  He expresses understanding    2. Benign essential HTN  Controlled, continue current therapy    3. Serum potassium elevated  -     Basic Metabolic Panel; Future  Reviewed labs due to slightly elevated potassium recommend recheck since patient is on lisinopril.  He denies any potassium supplement utilization.    4. Smokeless tobacco  use  Recommend avoidance of tobacco products    5. Morbid obesity with BMI of 50.0-59.9, adult  Lifestyle modifications including heart healthy diet, regular exercise, maintenance of desirable body weight and avoidance of tobacco products    Given his recent lab using the AHA/ACC risk calculator his 10-year risk was calculated at 2.2% and lifetime at 50% with lifestyle modifications recommended currently.  We will continue to monitor    Review of medical record    Follow-up in 4 to 6 weeks to discuss and review testing, sooner if needed.

## 2023-09-19 NOTE — LETTER
September 19, 2023     ALONDRA Li  [address] [place] [address]    Patient: Brady Mcknight   YOB: 1980   Date of Visit: 9/19/2023       Dear ALONDRA Li    Brady Mcknight was in my office today. Below is a copy of my note.    If you have questions, please do not hesitate to call me. I look forward to following Brady along with you.         Sincerely,        ALONDRA Alas        CC: No Recipients    Subjective    Brady Mcknight is a 43 y.o. male.   Chief Complaint   Patient presents with   • Establish Care     SOB, Possible Blockage because of hypertension per pcp     History of Present Illness   Brady Mcknight is a 43-year-old male who presents to clinic today as a new patient for cardiology evaluation.  He is accompanied by his spouse, Reshma.     He was referred by his PCP Jaclyn Kitchen for cardiac evaluation.  He has noticed intermittent exertional dyspnea, he denies chest pain.  His blood pressure was elevated and lisinopril was reinitiated.  He reports since going back on lisinopril 20 mg daily his blood pressure has improved and is around 125/80.  He denies history of hyperlipidemia or DM.  He does use smokeless tobacco.  He does use alcohol.  He does have underlying thyroid disease with recent changes and adjustments in his medication recommended however he has not picked up new Rx from pharmacy.  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   • Serum potassium elevated   • Morbid obesity with BMI of 50.0-59.9, adult   • Smokeless tobacco use     Past Medical History:   Diagnosis Date   • Anger    • Anxiety    • Arthritis    • Chronic back pain    • Depression    • Fatigue    •  "History of heart murmur in childhood    • Hyperlipidemia    • Hypertension    • Hypothyroidism    • Obesity    • Positive urine drug screen 03/2016   • Screening PSA (prostate specific antigen) 2014     Past Surgical History:   Procedure Laterality Date   • ABDOMINAL SURGERY     • CHOLECYSTECTOMY     • CHOLECYSTECTOMY WITH INTRAOPERATIVE CHOLANGIOGRAM N/A 9/5/2017    Procedure: CHOLECYSTECTOMY LAPAROSCOPIC INTRAOPERATIVE CHOLANGIOGRAM;  Surgeon: Joni Dawn MD;  Location: [facility];  Service:    • COLONOSCOPY N/A 1/4/2018    Procedure: COLONOSCOPY CPT CODE: 56722;  Surgeon: Remington Burroughs III, MD;  Location: [facility] OR;  Service:    • GASTRIC BANDING  08/21/2016   • VASECTOMY     • WISDOM TOOTH EXTRACTION         Family History   Problem Relation Age of Onset   • Cancer Mother         breast   • Lymphoma Father    • Cancer Sister         lung      Social History     Tobacco Use   • Smoking status: Former   • Smokeless tobacco: Current     Types: Snuff   Substance Use Topics   • Alcohol use: Yes     Alcohol/week: 30.0 standard drinks     Types: 30 Cans of beer per week     Comment: Drinks a 30 pack of beer and one bottle of whiskey  per week.   • Drug use: No         The following portions of the patient's history were reviewed and updated as appropriate: allergies, current medications, past family history, past medical history, past social history, past surgical history and problem list.    No Known Allergies      Current Outpatient Medications:   •  B-D 3CC LUER-ALBIN SYR 21GX1\" 21G X 1\" 3 ML misc, , Disp: , Rfl: 0  •  BD DISP NEEDLES 20G X 1-1/2\" misc, , Disp: , Rfl: 0  •  cetirizine (zyrTEC) 10 MG tablet, Take 1 tablet by mouth Daily., Disp: , Rfl:   •  levothyroxine (SYNTHROID, LEVOTHROID) 175 MCG tablet, Take 1 tablet by mouth Daily. (Patient taking differently: Take 150 mcg by mouth Daily.), Disp: 90 tablet, Rfl: 1  •  lisinopril (PRINIVIL,ZESTRIL) 20 MG tablet, TAKE ONE TABLET BY MOUTH EVERY DAY FOR " "BLOOD PRESSURE, Disp: 90 tablet, Rfl: 0  •  Syringe, Disposable, 3 ML misc, Use 3 ml syringe with a 25 gauge  5/8 inch needle, Disp: 24 each, Rfl: 6  •  Testosterone Cypionate (Depo-Testosterone) 200 MG/ML injection, He is to use 1/2 cc every Monday and Thursday SQ, Disp: 10 mL, Rfl: 2  •  venlafaxine XR (EFFEXOR-XR) 75 MG 24 hr capsule, TAKE ONE CAPSULE BY MOUTH EVERY DAY AS DIRECTED FOR NERVES (Patient taking differently: Take 2 capsules by mouth Daily.), Disp: 90 capsule, Rfl: 0    Review of Systems   Constitutional:  Negative for activity change, appetite change, chills, diaphoresis, fatigue and fever.   HENT:  Negative for congestion, drooling, ear discharge, ear pain, mouth sores, nosebleeds, postnasal drip, rhinorrhea, sinus pressure, sneezing and sore throat.    Eyes:  Negative for pain, discharge and visual disturbance.   Respiratory:  Positive for shortness of breath. Negative for cough, chest tightness and wheezing.    Cardiovascular:  Negative for chest pain, palpitations and leg swelling.   Gastrointestinal:  Negative for abdominal pain, constipation, diarrhea, nausea and vomiting.   Endocrine: Negative for cold intolerance, heat intolerance, polydipsia, polyphagia and polyuria.   Musculoskeletal:  Negative for arthralgias, myalgias and neck pain.   Skin:  Negative for rash and wound.   Neurological:  Negative for dizziness, syncope, speech difficulty, weakness, light-headedness and headaches.   Hematological:  Negative for adenopathy. Does not bruise/bleed easily.   Psychiatric/Behavioral:  Negative for confusion, dysphoric mood and sleep disturbance. The patient is not nervous/anxious.    All other systems reviewed and are negative.    /83 (BP Location: Right arm, Patient Position: Sitting, Cuff Size: Adult)   Pulse 101   Ht 175.3 cm (69.02\")   Wt (!) 154 kg (338 lb 12.8 oz)   SpO2 98%   BMI 50.00 kg/m²     Objective  No Known Allergies    Physical Exam  Vitals reviewed.   Constitutional:  "      Appearance: Normal appearance. He is well-developed. He is morbidly obese.   HENT:      Head: Normocephalic.   Eyes:      Conjunctiva/sclera: Conjunctivae normal.   Neck:      Thyroid: No thyromegaly.      Vascular: No carotid bruit or JVD.   Cardiovascular:      Rate and Rhythm: Regular rhythm. Tachycardia present.   Pulmonary:      Effort: Pulmonary effort is normal.      Breath sounds: Normal breath sounds.   Musculoskeletal:      Cervical back: Neck supple.      Right lower leg: No edema.      Left lower leg: No edema.   Skin:     General: Skin is warm and dry.   Neurological:      Mental Status: He is alert and oriented to person, place, and time.   Psychiatric:         Attention and Perception: Attention normal.         Mood and Affect: Mood normal.         Speech: Speech normal.         Behavior: Behavior normal. Behavior is cooperative.         Cognition and Memory: Cognition normal.         ECG 12 Lead    Date/Time: 9/19/2023 11:06 AM  Performed by: Kristen Liao APRN  Authorized by: Kristen Liao APRN   Comparison: compared with previous ECG   Similar to previous ECG  Rhythm: sinus rhythm  Rate: normal  BPM: 97    Clinical impression: normal ECG  Comments: QT/QTc - 335/390        LABS  WBC   Date Value Ref Range Status   06/08/2023 8.89 3.40 - 10.80 10*3/mm3 Final     RBC   Date Value Ref Range Status   06/08/2023 4.95 4.14 - 5.80 10*6/mm3 Final     Hemoglobin   Date Value Ref Range Status   06/08/2023 14.6 13.0 - 17.7 g/dL Final     Hematocrit   Date Value Ref Range Status   06/08/2023 44.1 37.5 - 51.0 % Final     MCV   Date Value Ref Range Status   06/08/2023 89.1 79.0 - 97.0 fL Final     MCH   Date Value Ref Range Status   06/08/2023 29.5 26.6 - 33.0 pg Final     MCHC   Date Value Ref Range Status   06/08/2023 33.1 31.5 - 35.7 g/dL Final     RDW   Date Value Ref Range Status   06/08/2023 14.1 12.3 - 15.4 % Final     RDW-SD   Date Value Ref Range Status   06/08/2023 46.2 37.0 - 54.0 fl  Final     MPV   Date Value Ref Range Status   06/08/2023 10.4 6.0 - 12.0 fL Final     Platelets   Date Value Ref Range Status   06/08/2023 342 140 - 450 10*3/mm3 Final     Neutrophil %   Date Value Ref Range Status   11/13/2018 64.9 30.0 - 70.0 % Final     Lymphocyte %   Date Value Ref Range Status   11/13/2018 23.1 21.0 - 51.0 % Final     Monocyte %   Date Value Ref Range Status   11/13/2018 9.3 0.0 - 10.0 % Final     Eosinophil %   Date Value Ref Range Status   11/13/2018 2.1 0.0 - 5.0 % Final     Basophil %   Date Value Ref Range Status   11/13/2018 0.3 0.0 - 2.0 % Final     Immature Grans %   Date Value Ref Range Status   11/13/2018 0.3 0.0 - 0.5 % Final     Neutrophils, Absolute   Date Value Ref Range Status   11/13/2018 6.33 1.40 - 6.50 10*3/mm3 Final     Lymphocytes, Absolute   Date Value Ref Range Status   11/13/2018 2.26 1.00 - 3.00 10*3/mm3 Final     Monocytes, Absolute   Date Value Ref Range Status   11/13/2018 0.91 (H) 0.10 - 0.90 10*3/mm3 Final     Eosinophils, Absolute   Date Value Ref Range Status   11/13/2018 0.21 0.00 - 0.70 10*3/mm3 Final     Basophils, Absolute   Date Value Ref Range Status   11/13/2018 0.03 0.00 - 0.30 10*3/mm3 Final     Immature Grans, Absolute   Date Value Ref Range Status   11/13/2018 0.03 0.00 - 0.03 10*3/mm3 Final     Total Cholesterol   Date Value Ref Range Status   12/28/2020 178 0 - 200 mg/dL Final     Triglycerides   Date Value Ref Range Status   12/28/2020 94 0 - 150 mg/dL Final     HDL Cholesterol   Date Value Ref Range Status   12/28/2020 68 (H) 40 - 60 mg/dL Final     LDL Cholesterol    Date Value Ref Range Status   12/28/2020 93 0 - 100 mg/dL Final             Assessment & Plan  Diagnoses and all orders for this visit:    1. Dyspnea on exertion (Primary)  -     ECG 12 Lead  -     Adult Transthoracic Echo Complete W/ Cont if Necessary Per Protocol; Future  -     Stress Test With Myocardial Perfusion (1 Day); Future  EKG reviewed and discussed, no acute  changes  Ischemic work-up will be arranged  Recommend daily aspirin  Advised if new or worsening symptoms following work-up, go to the ER.  He expresses understanding    2. Benign essential HTN  Controlled, continue current therapy    3. Serum potassium elevated  -     Basic Metabolic Panel; Future  Reviewed labs due to slightly elevated potassium recommend recheck since patient is on lisinopril.  He denies any potassium supplement utilization.    4. Smokeless tobacco use  Recommend avoidance of tobacco products    5. Morbid obesity with BMI of 50.0-59.9, adult  Lifestyle modifications including heart healthy diet, regular exercise, maintenance of desirable body weight and avoidance of tobacco products    Given his recent lab using the AHA/ACC risk calculator his 10-year risk was calculated at 2.2% and lifetime at 50% with lifestyle modifications recommended currently.  We will continue to monitor    Review of medical record    Follow-up in 4 to 6 weeks to discuss and review testing, sooner if needed.

## 2024-01-09 DIAGNOSIS — E29.1 HYPOGONADISM IN MALE: ICD-10-CM

## 2024-01-09 RX ORDER — TESTOSTERONE CYPIONATE 200 MG/ML
INJECTION, SOLUTION INTRAMUSCULAR
Qty: 10 ML | Refills: 2 | OUTPATIENT
Start: 2024-01-09

## 2025-02-16 ENCOUNTER — APPOINTMENT (OUTPATIENT)
Dept: CT IMAGING | Facility: HOSPITAL | Age: 45
End: 2025-02-16
Payer: COMMERCIAL

## 2025-02-16 ENCOUNTER — HOSPITAL ENCOUNTER (EMERGENCY)
Facility: HOSPITAL | Age: 45
Discharge: HOME | End: 2025-02-16
Attending: EMERGENCY MEDICINE | Admitting: EMERGENCY MEDICINE
Payer: COMMERCIAL

## 2025-02-16 ENCOUNTER — APPOINTMENT (OUTPATIENT)
Dept: GENERAL RADIOLOGY | Facility: HOSPITAL | Age: 45
End: 2025-02-16
Payer: COMMERCIAL

## 2025-02-16 VITALS
TEMPERATURE: 97.3 F | RESPIRATION RATE: 12 BRPM | HEART RATE: 73 BPM | BODY MASS INDEX: 46.65 KG/M2 | HEIGHT: 69 IN | WEIGHT: 315 LBS | OXYGEN SATURATION: 98 % | DIASTOLIC BLOOD PRESSURE: 107 MMHG | SYSTOLIC BLOOD PRESSURE: 165 MMHG

## 2025-02-16 DIAGNOSIS — R03.0 ELEVATED BLOOD PRESSURE READING: Primary | ICD-10-CM

## 2025-02-16 DIAGNOSIS — R51.9 ACUTE NONINTRACTABLE HEADACHE, UNSPECIFIED HEADACHE TYPE: ICD-10-CM

## 2025-02-16 LAB
ALBUMIN SERPL-MCNC: 4.5 G/DL (ref 3.5–5.2)
ALBUMIN/GLOB SERPL: 1.5 G/DL
ALP SERPL-CCNC: 86 U/L (ref 39–117)
ALT SERPL W P-5'-P-CCNC: 19 U/L (ref 1–41)
ANION GAP SERPL CALCULATED.3IONS-SCNC: 8.7 MMOL/L (ref 5–15)
AST SERPL-CCNC: 23 U/L (ref 1–40)
BASOPHILS # BLD AUTO: 0.07 10*3/MM3 (ref 0–0.2)
BASOPHILS NFR BLD AUTO: 0.9 % (ref 0–1.5)
BILIRUB SERPL-MCNC: 0.4 MG/DL (ref 0–1.2)
BUN SERPL-MCNC: 14 MG/DL (ref 6–20)
BUN/CREAT SERPL: 14.9 (ref 7–25)
CALCIUM SPEC-SCNC: 9.5 MG/DL (ref 8.6–10.5)
CHLORIDE SERPL-SCNC: 102 MMOL/L (ref 98–107)
CO2 SERPL-SCNC: 29.3 MMOL/L (ref 22–29)
CREAT SERPL-MCNC: 0.94 MG/DL (ref 0.76–1.27)
DEPRECATED RDW RBC AUTO: 41.9 FL (ref 37–54)
EGFRCR SERPLBLD CKD-EPI 2021: 101.2 ML/MIN/1.73
EOSINOPHIL # BLD AUTO: 0.41 10*3/MM3 (ref 0–0.4)
EOSINOPHIL NFR BLD AUTO: 5 % (ref 0.3–6.2)
ERYTHROCYTE [DISTWIDTH] IN BLOOD BY AUTOMATED COUNT: 12.9 % (ref 12.3–15.4)
GEN 5 1HR TROPONIN T REFLEX: <6 NG/L
GLOBULIN UR ELPH-MCNC: 3.1 GM/DL
GLUCOSE SERPL-MCNC: 88 MG/DL (ref 65–99)
HCT VFR BLD AUTO: 45 % (ref 37.5–51)
HGB BLD-MCNC: 15 G/DL (ref 13–17.7)
HOLD SPECIMEN: NORMAL
HOLD SPECIMEN: NORMAL
IMM GRANULOCYTES # BLD AUTO: 0.03 10*3/MM3 (ref 0–0.05)
IMM GRANULOCYTES NFR BLD AUTO: 0.4 % (ref 0–0.5)
LYMPHOCYTES # BLD AUTO: 2.98 10*3/MM3 (ref 0.7–3.1)
LYMPHOCYTES NFR BLD AUTO: 36.4 % (ref 19.6–45.3)
MCH RBC QN AUTO: 29.9 PG (ref 26.6–33)
MCHC RBC AUTO-ENTMCNC: 33.3 G/DL (ref 31.5–35.7)
MCV RBC AUTO: 89.6 FL (ref 79–97)
MONOCYTES # BLD AUTO: 0.67 10*3/MM3 (ref 0.1–0.9)
MONOCYTES NFR BLD AUTO: 8.2 % (ref 5–12)
NEUTROPHILS NFR BLD AUTO: 4.03 10*3/MM3 (ref 1.7–7)
NEUTROPHILS NFR BLD AUTO: 49.1 % (ref 42.7–76)
NRBC BLD AUTO-RTO: 0 /100 WBC (ref 0–0.2)
PLATELET # BLD AUTO: 309 10*3/MM3 (ref 140–450)
PMV BLD AUTO: 9.1 FL (ref 6–12)
POTASSIUM SERPL-SCNC: 3.9 MMOL/L (ref 3.5–5.2)
PROT SERPL-MCNC: 7.6 G/DL (ref 6–8.5)
QT INTERVAL: 386 MS
QTC INTERVAL: 445 MS
RBC # BLD AUTO: 5.02 10*6/MM3 (ref 4.14–5.8)
SODIUM SERPL-SCNC: 140 MMOL/L (ref 136–145)
TROPONIN T NUMERIC DELTA: NORMAL
TROPONIN T SERPL HS-MCNC: <6 NG/L
WBC NRBC COR # BLD AUTO: 8.19 10*3/MM3 (ref 3.4–10.8)
WHOLE BLOOD HOLD COAG: NORMAL
WHOLE BLOOD HOLD SPECIMEN: NORMAL

## 2025-02-16 PROCEDURE — 70450 CT HEAD/BRAIN W/O DYE: CPT | Performed by: RADIOLOGY

## 2025-02-16 PROCEDURE — 93010 ELECTROCARDIOGRAM REPORT: CPT | Performed by: INTERNAL MEDICINE

## 2025-02-16 PROCEDURE — 25010000002 KETOROLAC TROMETHAMINE PER 15 MG: Performed by: NURSE PRACTITIONER

## 2025-02-16 PROCEDURE — 84484 ASSAY OF TROPONIN QUANT: CPT | Performed by: EMERGENCY MEDICINE

## 2025-02-16 PROCEDURE — 71045 X-RAY EXAM CHEST 1 VIEW: CPT | Performed by: RADIOLOGY

## 2025-02-16 PROCEDURE — 99284 EMERGENCY DEPT VISIT MOD MDM: CPT

## 2025-02-16 PROCEDURE — 25010000002 ONDANSETRON PER 1 MG: Performed by: NURSE PRACTITIONER

## 2025-02-16 PROCEDURE — 25010000002 LABETALOL 5 MG/ML SOLUTION: Performed by: NURSE PRACTITIONER

## 2025-02-16 PROCEDURE — 93005 ELECTROCARDIOGRAM TRACING: CPT | Performed by: EMERGENCY MEDICINE

## 2025-02-16 PROCEDURE — 36415 COLL VENOUS BLD VENIPUNCTURE: CPT

## 2025-02-16 PROCEDURE — 25010000002 HYDRALAZINE PER 20 MG: Performed by: NURSE PRACTITIONER

## 2025-02-16 PROCEDURE — 71045 X-RAY EXAM CHEST 1 VIEW: CPT

## 2025-02-16 PROCEDURE — 70450 CT HEAD/BRAIN W/O DYE: CPT

## 2025-02-16 PROCEDURE — 85025 COMPLETE CBC W/AUTO DIFF WBC: CPT | Performed by: EMERGENCY MEDICINE

## 2025-02-16 PROCEDURE — 96374 THER/PROPH/DIAG INJ IV PUSH: CPT

## 2025-02-16 PROCEDURE — 80053 COMPREHEN METABOLIC PANEL: CPT | Performed by: EMERGENCY MEDICINE

## 2025-02-16 PROCEDURE — 25010000002 MORPHINE PER 10 MG: Performed by: NURSE PRACTITIONER

## 2025-02-16 PROCEDURE — 96375 TX/PRO/DX INJ NEW DRUG ADDON: CPT

## 2025-02-16 RX ORDER — ASPIRIN 81 MG/1
324 TABLET, CHEWABLE ORAL ONCE
Status: COMPLETED | OUTPATIENT
Start: 2025-02-16 | End: 2025-02-16

## 2025-02-16 RX ORDER — HYDRALAZINE HYDROCHLORIDE 20 MG/ML
10 INJECTION INTRAMUSCULAR; INTRAVENOUS ONCE
Status: DISCONTINUED | OUTPATIENT
Start: 2025-02-16 | End: 2025-02-16

## 2025-02-16 RX ORDER — CLONIDINE HYDROCHLORIDE 0.1 MG/1
0.1 TABLET ORAL ONCE
Status: COMPLETED | OUTPATIENT
Start: 2025-02-16 | End: 2025-02-16

## 2025-02-16 RX ORDER — ONDANSETRON 2 MG/ML
4 INJECTION INTRAMUSCULAR; INTRAVENOUS ONCE
Status: COMPLETED | OUTPATIENT
Start: 2025-02-16 | End: 2025-02-16

## 2025-02-16 RX ORDER — HYDRALAZINE HYDROCHLORIDE 20 MG/ML
20 INJECTION INTRAMUSCULAR; INTRAVENOUS ONCE
Status: COMPLETED | OUTPATIENT
Start: 2025-02-16 | End: 2025-02-16

## 2025-02-16 RX ORDER — LABETALOL HYDROCHLORIDE 5 MG/ML
10 INJECTION, SOLUTION INTRAVENOUS ONCE
Status: COMPLETED | OUTPATIENT
Start: 2025-02-16 | End: 2025-02-16

## 2025-02-16 RX ORDER — HYDROMORPHONE HYDROCHLORIDE 1 MG/ML
0.5 INJECTION, SOLUTION INTRAMUSCULAR; INTRAVENOUS; SUBCUTANEOUS ONCE
Status: DISCONTINUED | OUTPATIENT
Start: 2025-02-16 | End: 2025-02-16

## 2025-02-16 RX ORDER — SODIUM CHLORIDE 0.9 % (FLUSH) 0.9 %
10 SYRINGE (ML) INJECTION AS NEEDED
Status: DISCONTINUED | OUTPATIENT
Start: 2025-02-16 | End: 2025-02-16 | Stop reason: HOSPADM

## 2025-02-16 RX ORDER — LISINOPRIL 20 MG/1
20 TABLET ORAL DAILY
Qty: 20 TABLET | Refills: 0 | Status: SHIPPED | OUTPATIENT
Start: 2025-02-16

## 2025-02-16 RX ORDER — KETOROLAC TROMETHAMINE 30 MG/ML
30 INJECTION, SOLUTION INTRAMUSCULAR; INTRAVENOUS ONCE
Status: COMPLETED | OUTPATIENT
Start: 2025-02-16 | End: 2025-02-16

## 2025-02-16 RX ADMIN — CLONIDINE HYDROCHLORIDE 0.1 MG: 0.1 TABLET ORAL at 12:52

## 2025-02-16 RX ADMIN — HYDRALAZINE HYDROCHLORIDE 20 MG: 20 INJECTION INTRAMUSCULAR; INTRAVENOUS at 15:16

## 2025-02-16 RX ADMIN — MORPHINE SULFATE 4 MG: 4 INJECTION, SOLUTION INTRAMUSCULAR; INTRAVENOUS at 13:46

## 2025-02-16 RX ADMIN — LABETALOL HYDROCHLORIDE 10 MG: 5 INJECTION, SOLUTION INTRAVENOUS at 11:10

## 2025-02-16 RX ADMIN — KETOROLAC TROMETHAMINE 30 MG: 30 INJECTION, SOLUTION INTRAMUSCULAR; INTRAVENOUS at 11:31

## 2025-02-16 RX ADMIN — ONDANSETRON 4 MG: 2 INJECTION INTRAMUSCULAR; INTRAVENOUS at 13:45

## 2025-02-16 RX ADMIN — ASPIRIN 324 MG: 81 TABLET, CHEWABLE ORAL at 11:03

## 2025-02-16 NOTE — ED PROVIDER NOTES
Subjective   History of Present Illness  Patient is a 44-year-old male presents today complaining of chest pain and elevated blood pressure.  Patient reports that at home today his blood pressure is really high.  Patient reports he has pain the left side of his chest as well as some pain in the left side of his head.  Patient denies any unilateral weakness.  Patient denies shortness of breath.  Patient reports patient was get a stress test last year but did not go.    Chest Pain      Review of Systems   Constitutional: Negative.    HENT: Negative.     Eyes: Negative.    Respiratory: Negative.     Cardiovascular:  Positive for chest pain.   Gastrointestinal: Negative.    Endocrine: Negative.    Genitourinary: Negative.    Musculoskeletal: Negative.    Skin: Negative.    Allergic/Immunologic: Negative.    Neurological: Negative.    Hematological: Negative.    Psychiatric/Behavioral: Negative.         Past Medical History:   Diagnosis Date    Anger     Anxiety     Arthritis     Chronic back pain     Depression     Fatigue     History of heart murmur in childhood     Hyperlipidemia     Hypertension     Hypothyroidism     Obesity     Positive urine drug screen 03/2016    Screening PSA (prostate specific antigen) 2014       No Known Allergies    Past Surgical History:   Procedure Laterality Date    ABDOMINAL SURGERY      CHOLECYSTECTOMY      CHOLECYSTECTOMY WITH INTRAOPERATIVE CHOLANGIOGRAM N/A 9/5/2017    Procedure: CHOLECYSTECTOMY LAPAROSCOPIC INTRAOPERATIVE CHOLANGIOGRAM;  Surgeon: Joni Dawn MD;  Location: [facility] OR;  Service:     COLONOSCOPY N/A 1/4/2018    Procedure: COLONOSCOPY CPT CODE: 88221;  Surgeon: Remington Burroughs III, MD;  Location: [facility] OR;  Service:     GASTRIC BANDING  08/21/2016    VASECTOMY      WISDOM TOOTH EXTRACTION         Family History   Problem Relation Age of Onset    Cancer Mother         breast    Lymphoma Father     Cancer Sister         lung        Social History      Socioeconomic History    Marital status:    Tobacco Use    Smoking status: Former    Smokeless tobacco: Current     Types: Snuff   Substance and Sexual Activity    Alcohol use: Yes     Alcohol/week: 30.0 standard drinks of alcohol     Types: 30 Cans of beer per week     Comment: Drinks a 30 pack of beer and one bottle of whiskey  per week.    Drug use: No    Sexual activity: Defer           Objective   Physical Exam  Vitals and nursing note reviewed.   Constitutional:       Appearance: He is well-developed.   HENT:      Head: Normocephalic.      Right Ear: External ear normal.      Left Ear: External ear normal.   Eyes:      Conjunctiva/sclera: Conjunctivae normal.      Pupils: Pupils are equal, round, and reactive to light.   Cardiovascular:      Rate and Rhythm: Normal rate and regular rhythm.      Heart sounds: Normal heart sounds.   Pulmonary:      Effort: Pulmonary effort is normal.      Breath sounds: Normal breath sounds.   Abdominal:      General: Bowel sounds are normal.      Palpations: Abdomen is soft.   Musculoskeletal:         General: Normal range of motion.      Cervical back: Normal range of motion and neck supple.   Skin:     General: Skin is warm and dry.      Capillary Refill: Capillary refill takes less than 2 seconds.   Neurological:      Mental Status: He is alert and oriented to person, place, and time.   Psychiatric:         Behavior: Behavior normal.         Thought Content: Thought content normal.         Procedures       Results for orders placed or performed during the hospital encounter of 02/16/25   ECG 12 Lead ED Triage Standing Order; Chest Pain    Collection Time: 02/16/25 10:18 AM   Result Value Ref Range    QT Interval 386 ms    QTC Interval 445 ms   Comprehensive Metabolic Panel    Collection Time: 02/16/25 10:58 AM    Specimen: Blood   Result Value Ref Range    Glucose 88 65 - 99 mg/dL    BUN 14 6 - 20 mg/dL    Creatinine 0.94 0.76 - 1.27 mg/dL    Sodium 140 136 - 145  mmol/L    Potassium 3.9 3.5 - 5.2 mmol/L    Chloride 102 98 - 107 mmol/L    CO2 29.3 (H) 22.0 - 29.0 mmol/L    Calcium 9.5 8.6 - 10.5 mg/dL    Total Protein 7.6 6.0 - 8.5 g/dL    Albumin 4.5 3.5 - 5.2 g/dL    ALT (SGPT) 19 1 - 41 U/L    AST (SGOT) 23 1 - 40 U/L    Alkaline Phosphatase 86 39 - 117 U/L    Total Bilirubin 0.4 0.0 - 1.2 mg/dL    Globulin 3.1 gm/dL    A/G Ratio 1.5 g/dL    BUN/Creatinine Ratio 14.9 7.0 - 25.0    Anion Gap 8.7 5.0 - 15.0 mmol/L    eGFR 101.2 >60.0 mL/min/1.73   High Sensitivity Troponin T    Collection Time: 02/16/25 10:58 AM    Specimen: Blood   Result Value Ref Range    HS Troponin T <6 <22 ng/L   CBC Auto Differential    Collection Time: 02/16/25 10:58 AM    Specimen: Blood   Result Value Ref Range    WBC 8.19 3.40 - 10.80 10*3/mm3    RBC 5.02 4.14 - 5.80 10*6/mm3    Hemoglobin 15.0 13.0 - 17.7 g/dL    Hematocrit 45.0 37.5 - 51.0 %    MCV 89.6 79.0 - 97.0 fL    MCH 29.9 26.6 - 33.0 pg    MCHC 33.3 31.5 - 35.7 g/dL    RDW 12.9 12.3 - 15.4 %    RDW-SD 41.9 37.0 - 54.0 fl    MPV 9.1 6.0 - 12.0 fL    Platelets 309 140 - 450 10*3/mm3    Neutrophil % 49.1 42.7 - 76.0 %    Lymphocyte % 36.4 19.6 - 45.3 %    Monocyte % 8.2 5.0 - 12.0 %    Eosinophil % 5.0 0.3 - 6.2 %    Basophil % 0.9 0.0 - 1.5 %    Immature Grans % 0.4 0.0 - 0.5 %    Neutrophils, Absolute 4.03 1.70 - 7.00 10*3/mm3    Lymphocytes, Absolute 2.98 0.70 - 3.10 10*3/mm3    Monocytes, Absolute 0.67 0.10 - 0.90 10*3/mm3    Eosinophils, Absolute 0.41 (H) 0.00 - 0.40 10*3/mm3    Basophils, Absolute 0.07 0.00 - 0.20 10*3/mm3    Immature Grans, Absolute 0.03 0.00 - 0.05 10*3/mm3    nRBC 0.0 0.0 - 0.2 /100 WBC   Green Top (Gel)    Collection Time: 02/16/25 10:58 AM   Result Value Ref Range    Extra Tube Hold for add-ons.    Lavender Top    Collection Time: 02/16/25 10:58 AM   Result Value Ref Range    Extra Tube hold for add-on    Gold Top - SST    Collection Time: 02/16/25 10:58 AM   Result Value Ref Range    Extra Tube Hold for add-ons.     Light Blue Top    Collection Time: 02/16/25 10:58 AM   Result Value Ref Range    Extra Tube Hold for add-ons.    High Sensitivity Troponin T 1Hr    Collection Time: 02/16/25 12:26 PM    Specimen: Blood   Result Value Ref Range    HS Troponin T <6 <22 ng/L    Troponin T Numeric Delta       CT Head Without Contrast   Final Result     Unremarkable exam demonstrating no CT evidence of acute intracranial   findings.       This report was finalized on 2/16/2025 1:36 PM by Dr. Fredrick Nava MD.          XR Chest 1 View   Final Result   Impression:       Normal heart size.   Mild bronchial inflammation.   No lobar consolidation.   Mild hypoinflated lungs.       This report was finalized on 2/16/2025 11:25 AM by Nitin Gomze MD.                  ED Course  ED Course as of 02/16/25 1344   Sun Feb 16, 2025   1133 EKG at 1018 showed sinus rhythm, rate 80.  TN interval 138, QRS duration 102, QTc 445 ms.  Nonspecific T wave changes.  No evidence for STEMI.  Electronically signed by Zeferino Valdez MD, 02/16/25, 11:34 AM EST.   [CM]      ED Course User Index  [CM] Zeferino Valdez MD                                                       Medical Decision Making  MDM:    Escalation of care including admission/observation considered    - Discussions of management with other providers:  None    - Discussed/reviewed with Radiology regarding test interpretation    - Independent interpretation: None    - Additional patient history obtained from: None    - Review of external non-ED record (if available):  Prior Inpt record, Office record, Outpt record, Prior Outpt labs, PCP record, Outside ED record, Other    - Chronic conditions affecting care: See HPI and medical Hx.    - Social Determinants of health significantly affecting care:  None        Medical Decision Making Discussion:    Patient is a 44-year-old male presents today complaining of chest pain and elevated blood pressure.  Patient reports that at home today his  blood pressure is really high.  Patient reports he has pain the left side of his chest as well as some pain in the left side of his head.  Patient denies any unilateral weakness.  Patient denies shortness of breath.  Patient reports patient was get a stress test last year but did not go.      The patient has been given very strict return precautions to return to the emergency department should there be any acute change or worsening of their condition.  I have explained my findings and the patient has expressed understanding to me.  I explained that the work-up performed in the ED has been based on the specific complaint and concern, as the nature of emergency medicine is complaint driven and they understand that new symptoms may arise.  I have told them that, should there be any new symptoms, worsening or changing symptoms, a new work-up may be indicated that they are encouraged to return to the emergency department or promptly contact their primary care physician. We have employed a shared decision-making process as the discussion of their disposition.  The patient has been educated as to the nature of the visit, the tests and work-up performed and the findings from today's visit. At this time, there does not appear to be any acute emergent process that necessitates admission to the hospital, however, the patient understands that this can change unexpectedly. At this time, the patient is stable for discharge home and agrees to follow-up with her primary care physician in the next 24 to 48 hours or earlier should they be able to obtain an appointment.    The patient was counseled regarding diagnostic results and treatment plan and patient has indicated understanding of these instructions.      Problems Addressed:  Acute nonintractable headache, unspecified headache type: complicated acute illness or injury  Elevated blood pressure reading: complicated acute illness or injury    Amount and/or Complexity of Data  Reviewed  Labs: ordered. Decision-making details documented in ED Course.  Radiology: ordered. Decision-making details documented in ED Course.  ECG/medicine tests: ordered. Decision-making details documented in ED Course.    Risk  OTC drugs.  Prescription drug management.        Final diagnoses:   Elevated blood pressure reading   Acute nonintractable headache, unspecified headache type       ED Disposition  ED Disposition       ED Disposition   Discharge    Condition   Stable    Comment   --               No follow-up provider specified.       Medication List        Changed      levothyroxine 175 MCG tablet  Commonly known as: SYNTHROID, LEVOTHROID  Take 1 tablet by mouth Daily.  What changed: how much to take     lisinopril 20 MG tablet  Commonly known as: PRINIVIL,ZESTRIL  Take 1 tablet by mouth Daily.  What changed: additional instructions     venlafaxine XR 75 MG 24 hr capsule  Commonly known as: EFFEXOR-XR  TAKE ONE CAPSULE BY MOUTH EVERY DAY AS DIRECTED FOR NERVES  What changed: See the new instructions.               Where to Get Your Medications        These medications were sent to [facility] - [place], [place] - [address]  [phone] [facility] [phone] FX  [address] [facility] [address] [place] [address]      Phone: [phone]   lisinopril 20 MG tablet            Jorge Antunez, APRN  02/16/25 0224

## 2025-02-16 NOTE — DISCHARGE INSTRUCTIONS
Call one of the offices below to establish a primary care provider.  If you are unable to get an appointment and feel it is an emergency and need to be seen immediately please return to the Emergency Department    Call one of the offices below to set up a primary care provider.       Dr. Givens  [address]  [facility] [address]  [phone]    [facility] ([facility])  [address]  [facility] [id number] [phone]    [facility] ([place])                                                                                                       [address] [facility] [address]  [phone]    [facility])  [address]  [facility] [address]  [phone]    [facility] ([place])  [address]  [place] [address]  Phone: [phone]    [facility]  [address] [facility] [id number] [phone]    [facility]  [address]  [place] [address]  [phone]      [facility])  [address] [place] [address]  Phone: [phone]    Dr. Precious Crawford  [address] [facility] [address]  [facility] [address]  [phone]    [place] [id number]  [phone]    [facility]  [facility], APRN  [address] [place] [address]  [phone]    Dr. Choe and [facility]   [address] [facility]  [address]  [place] [address]  [phone]                   Please follow up with your primary care physician in the next 1-3 days. If this is not possible, please return to the ED for re-evaluation.    It is IMPORTANT to see your DOCTOR OR PRIMARY CARE PROVIDER. Emergency Care may be incomplete without proper follow-up.  Your evaluation in the emergency department is focused on a specific clinical complaint, at a given moment in time.  Symptoms sometimes change or new symptoms might arise  after you leave the Emergency Department. It is important that you call your doctor if you become worse in any way, or promptly return to the Emergency Department should any new, or worsening/changing symptom occur.  You are strongly urged to follow-up with your physician to assure complete and thorough care. PLEASE CALL YOUR DOCTORS OFFICE TODAY, INFORM THEM THAT YOU WERE SEEN IN THE EMERGENCY DEPARTMENT, AND THAT YOU NEED TO BE SEEN IMMEDIATELY FOR CLOSE FOLLOW UP.  If you do not have a primary care doctor we encourage you to proactively seek a local physician for close follow up.  Consider local clinics, free or sliding scale clinics, local [facility].  You can always return to the Emergency Department if you are having difficulty coordinating close follow up.  If medications were prescribed you should fill them at your local pharmacy immediately and take only as prescribed.  Bring your new medications to your doctors follow up visit to discuss any changes that would be necessary. RETURN TO THE EMERGENCY DEPARTMENT IMMEDIATELY FOR ANY NEW OR WORSENING SYMPTOMS.    ADDITIONAL DISCHARGE INSTRUCTIONS:     - If you received IV contrast today with a CT or MRI please stay well hydrated for the next 48-72 hours to protect your kidneys.    - If you have been prescribed an antibiotic, taking an over the counter probiotic may help with gastrointestinal side effects and antibiotic associated diarrhea.    - Return to the emergency department or seek immediate medical care if any of the following occur:           - Symptoms do not improve in 8-12 hours. If this occurs, please return to the emergency department for re-evaluation or your symptoms or repeat abdominal examination         - Symptoms worsen at any time.         - If you are unable to follow up with a medical provider as instructed.         - You develop any worrisome symptoms such as fever, chills, uncontrolled pain, change or worsening of your pain symptoms,  intractable vomiting, uncontrolled diarrhea, blood in your stool, dark/tarry stool, new neurological symptoms etc.    If you have been prescribed a narcotic pain medication, please take a stool softener to prevent constipation.     During your ED visit, you may have been given narcotics (such as morphine, dilaudid, percocet, vicodin) or sedatives (such as ativan, versed). These medications can impair your reflexes so, DO NOT DRIVE or USE ANY MACHINERY for at least 6 hours if you have been given these medications.    REMINDER FOR METFORMIN USERS: If you are using metformin (also known as Glucophage) and if you received a CT scan in which you received IV contrast, you must hold your metformin for a total of 72 hrs.  This will prevent any adverse interactions between the two agents.

## 2025-02-21 ENCOUNTER — OFFICE VISIT (OUTPATIENT)
Dept: FAMILY MEDICINE CLINIC | Facility: CLINIC | Age: 45
End: 2025-02-21
Payer: COMMERCIAL

## 2025-02-21 VITALS
OXYGEN SATURATION: 96 % | SYSTOLIC BLOOD PRESSURE: 162 MMHG | BODY MASS INDEX: 46.65 KG/M2 | WEIGHT: 315 LBS | DIASTOLIC BLOOD PRESSURE: 100 MMHG | TEMPERATURE: 97.3 F | HEIGHT: 69 IN | HEART RATE: 82 BPM

## 2025-02-21 DIAGNOSIS — R07.89 OTHER CHEST PAIN: Primary | ICD-10-CM

## 2025-02-21 DIAGNOSIS — I10 BENIGN ESSENTIAL HTN: ICD-10-CM

## 2025-02-21 DIAGNOSIS — G89.29 CHRONIC LOW BACK PAIN WITH SCIATICA, SCIATICA LATERALITY UNSPECIFIED, UNSPECIFIED BACK PAIN LATERALITY: ICD-10-CM

## 2025-02-21 DIAGNOSIS — R06.02 SHORTNESS OF BREATH: ICD-10-CM

## 2025-02-21 DIAGNOSIS — M54.40 CHRONIC LOW BACK PAIN WITH SCIATICA, SCIATICA LATERALITY UNSPECIFIED, UNSPECIFIED BACK PAIN LATERALITY: ICD-10-CM

## 2025-02-21 PROCEDURE — 99214 OFFICE O/P EST MOD 30 MIN: CPT | Performed by: NURSE PRACTITIONER

## 2025-02-23 NOTE — PROGRESS NOTES
[facility]     Chief Complaint  Establish Care and Hypertension    Brady Mcknight is a 44 y.o. male who presents today to [facility] for Establish Care and Hypertension.    HPI:   HPI   History of Present Illness  The patient presents for evaluation of chest pain, hypertension, shortness of breath, back pain, and ear infection.    He experienced a recent episode of chest pain on Sunday, which prompted an emergency room visit. Despite the ER's reassurance that his condition was stable, he was discharged with persistently elevated blood pressure. He describes his chest pain as sharp and transient, localized to the shoulder area. He also reports intermittent fatigue and a need for frequent naps, sleeping for 1.5 to 2 hours at a time, a pattern that has been ongoing for several months. He has not undergone a stress test due to loss of health insurance.    He was prescribed lisinopril 20 mg, with instructions to take it twice daily if necessary. However, his blood pressure remains high. He has been experiencing severe headaches for the past few weeks, with one particularly intense episode occurring upon waking one morning. During this episode, his blood pressure was recorded as 200/126, leading to his hospitalization last week. He has not had any recent health check-ups, with his last doctor's visit being 3 years ago. He has a history of hypertension but discontinued his medication 7 months ago due to loss of insurance coverage. He is a former smoker and reports occasional swelling in his ankles and feet. He has no known history of blood clots.    He reports experiencing dyspnea, even during light activities such as walking around his house. This symptom has been present for the past 1 to 2 days. He also reports an incident this morning where he experienced visual disturbances, specifically flashes in his peripheral vision on the left side, after returning indoors from sweeping  outside. He has no known pulmonary issues and has not undergone a CT scan of his lungs. He reports no history of diabetes or recent lung problems. He states the chest pain he is experiencing today is worse that what he did previously when he went to the ED.     He has a history of a ruptured disc in his back, which causes significant pain. He has noticed that his back pain has worsened recently and is now radiating down his leg. He has previously undergone an MRI of his lower back, which revealed a bulging disc at L5-S1 impinging on a nerve. He suspects that the disc may have ruptured, although he is not certain. He has received injections in his neck for pain management, which were effective, but similar injections in his lower back did not provide relief. He has been experiencing back pain since 2001.    He has a history of ear infections, which have been ongoing for several years and have not resolved. He reports that his ear occasionally pops and becomes blocked.    SOCIAL HISTORY  The patient used to smoke.    MEDICATIONS  Current: lisinopril    Previous History:   Past Medical History:   Diagnosis Date    Anger     Anxiety     Arthritis     Chronic back pain     Depression     Fatigue     History of heart murmur in childhood     Hyperlipidemia     Hypertension     Hypothyroidism     Obesity     Positive urine drug screen 03/2016    Screening PSA (prostate specific antigen) 2014      Past Surgical History:   Procedure Laterality Date    ABDOMINAL SURGERY      CHOLECYSTECTOMY      CHOLECYSTECTOMY WITH INTRAOPERATIVE CHOLANGIOGRAM N/A 9/5/2017    Procedure: CHOLECYSTECTOMY LAPAROSCOPIC INTRAOPERATIVE CHOLANGIOGRAM;  Surgeon: Joni Dawn MD;  Location: [facility] OR;  Service:     COLONOSCOPY N/A 1/4/2018    Procedure: COLONOSCOPY CPT CODE: 06064;  Surgeon: Remington Burroughs III, MD;  Location: [facility] OR;  Service:     GASTRIC BANDING  08/21/2016    VASECTOMY      WISDOM TOOTH EXTRACTION        Social History  "    Socioeconomic History    Marital status:    Tobacco Use    Smoking status: Never    Smokeless tobacco: Current     Types: Snuff   Vaping Use    Vaping status: Every Day    Start date: 12/1/2024    Substances: Nicotine    Devices: Disposable   Substance and Sexual Activity    Alcohol use: Yes     Alcohol/week: 30.0 standard drinks of alcohol     Types: 30 Cans of beer per week     Comment: Drinks a 30 pack of beer and one bottle of whiskey  per week.    Drug use: No    Sexual activity: Defer      Health Maintenance Due   Topic Date Due    TDAP/TD VACCINES (1 - Tdap) Never done    HEPATITIS C SCREENING  Never done    ANNUAL PHYSICAL  Never done    LIPID PANEL  12/28/2021    BMI FOLLOWUP  02/11/2022    COVID-19 Vaccine (3 - 2024-25 season) 09/01/2024        Current Medications:  Current Outpatient Medications   Medication Sig Dispense Refill    lisinopril (PRINIVIL,ZESTRIL) 20 MG tablet Take 1 tablet by mouth Daily. 20 tablet 0    B-D 3CC LUER-ALBIN SYR 21GX1\" 21G X 1\" 3 ML misc  (Patient not taking: Reported on 2/21/2025)  0    BD DISP NEEDLES 20G X 1-1/2\" misc  (Patient not taking: Reported on 2/21/2025)  0    cetirizine (zyrTEC) 10 MG tablet Take 1 tablet by mouth Daily. (Patient not taking: Reported on 2/21/2025)      levothyroxine (SYNTHROID, LEVOTHROID) 175 MCG tablet Take 1 tablet by mouth Daily. (Patient not taking: Reported on 2/21/2025) 90 tablet 1    venlafaxine XR (EFFEXOR-XR) 75 MG 24 hr capsule TAKE ONE CAPSULE BY MOUTH EVERY DAY AS DIRECTED FOR NERVES (Patient not taking: Reported on 2/21/2025) 90 capsule 0     No current facility-administered medications for this visit.       Allergies:   No Known Allergies    Vitals:   /100 (BP Location: Right arm, Patient Position: Sitting)   Pulse 82   Temp 97.3 °F (36.3 °C) (Temporal)   Ht 175.3 cm (69.02\")   Wt (!) 159 kg (350 lb)   SpO2 96%   BMI 51.66 kg/m²   Estimated body mass index is 51.66 kg/m² as calculated from the following:    Height " "as of this encounter: 175.3 cm (69.02\").    Weight as of this encounter: 159 kg (350 lb).             Physical Exam:   Physical Exam  Vitals reviewed.   Constitutional:       Appearance: Normal appearance.   HENT:      Head: Normocephalic.      Right Ear: Tympanic membrane and ear canal normal.      Left Ear: Tympanic membrane and ear canal normal.      Nose: Nose normal.   Eyes:      Extraocular Movements: Extraocular movements intact.      Conjunctiva/sclera: Conjunctivae normal.   Cardiovascular:      Rate and Rhythm: Normal rate.      Heart sounds: Normal heart sounds.   Pulmonary:      Effort: Pulmonary effort is normal.      Breath sounds: Normal breath sounds.   Abdominal:      General: Bowel sounds are normal.      Palpations: Abdomen is soft.   Skin:     General: Skin is warm and dry.   Neurological:      Mental Status: He is alert. Mental status is at baseline.      Comments: Normal gait    Psychiatric:         Mood and Affect: Mood normal.        Physical Exam      Vital Signs  Blood pressure is 162/100.       Lab Results:   Admission on 02/16/2025, Discharged on 02/16/2025   Component Date Value Ref Range Status    QT Interval 02/16/2025 386  ms Final    QTC Interval 02/16/2025 445  ms Final    Glucose 02/16/2025 88  65 - 99 mg/dL Final    BUN 02/16/2025 14  6 - 20 mg/dL Final    Creatinine 02/16/2025 0.94  0.76 - 1.27 mg/dL Final    Sodium 02/16/2025 140  136 - 145 mmol/L Final    Potassium 02/16/2025 3.9  3.5 - 5.2 mmol/L Final    Chloride 02/16/2025 102  98 - 107 mmol/L Final    CO2 02/16/2025 29.3 (H)  22.0 - 29.0 mmol/L Final    Calcium 02/16/2025 9.5  8.6 - 10.5 mg/dL Final    Total Protein 02/16/2025 7.6  6.0 - 8.5 g/dL Final    Albumin 02/16/2025 4.5  3.5 - 5.2 g/dL Final    ALT (SGPT) 02/16/2025 19  1 - 41 U/L Final    AST (SGOT) 02/16/2025 23  1 - 40 U/L Final    Alkaline Phosphatase 02/16/2025 86  39 - 117 U/L Final    Total Bilirubin 02/16/2025 0.4  0.0 - 1.2 mg/dL Final    Globulin " 02/16/2025 3.1  gm/dL Final    A/G Ratio 02/16/2025 1.5  g/dL Final    BUN/Creatinine Ratio 02/16/2025 14.9  7.0 - 25.0 Final    Anion Gap 02/16/2025 8.7  5.0 - 15.0 mmol/L Final    eGFR 02/16/2025 101.2  >60.0 mL/min/1.73 Final    HS Troponin T 02/16/2025 <6  <22 ng/L Final    Extra Tube 02/16/2025 Hold for add-ons.   Final    Auto resulted.    Extra Tube 02/16/2025 hold for add-on   Final    Auto resulted    Extra Tube 02/16/2025 Hold for add-ons.   Final    Auto resulted.    Extra Tube 02/16/2025 Hold for add-ons.   Final    Auto resulted    WBC 02/16/2025 8.19  3.40 - 10.80 10*3/mm3 Final    RBC 02/16/2025 5.02  4.14 - 5.80 10*6/mm3 Final    Hemoglobin 02/16/2025 15.0  13.0 - 17.7 g/dL Final    Hematocrit 02/16/2025 45.0  37.5 - 51.0 % Final    MCV 02/16/2025 89.6  79.0 - 97.0 fL Final    MCH 02/16/2025 29.9  26.6 - 33.0 pg Final    MCHC 02/16/2025 33.3  31.5 - 35.7 g/dL Final    RDW 02/16/2025 12.9  12.3 - 15.4 % Final    RDW-SD 02/16/2025 41.9  37.0 - 54.0 fl Final    MPV 02/16/2025 9.1  6.0 - 12.0 fL Final    Platelets 02/16/2025 309  140 - 450 10*3/mm3 Final    Neutrophil % 02/16/2025 49.1  42.7 - 76.0 % Final    Lymphocyte % 02/16/2025 36.4  19.6 - 45.3 % Final    Monocyte % 02/16/2025 8.2  5.0 - 12.0 % Final    Eosinophil % 02/16/2025 5.0  0.3 - 6.2 % Final    Basophil % 02/16/2025 0.9  0.0 - 1.5 % Final    Immature Grans % 02/16/2025 0.4  0.0 - 0.5 % Final    Neutrophils, Absolute 02/16/2025 4.03  1.70 - 7.00 10*3/mm3 Final    Lymphocytes, Absolute 02/16/2025 2.98  0.70 - 3.10 10*3/mm3 Final    Monocytes, Absolute 02/16/2025 0.67  0.10 - 0.90 10*3/mm3 Final    Eosinophils, Absolute 02/16/2025 0.41 (H)  0.00 - 0.40 10*3/mm3 Final    Basophils, Absolute 02/16/2025 0.07  0.00 - 0.20 10*3/mm3 Final    Immature Grans, Absolute 02/16/2025 0.03  0.00 - 0.05 10*3/mm3 Final    nRBC 02/16/2025 0.0  0.0 - 0.2 /100 WBC Final    HS Troponin T 02/16/2025 <6  <22 ng/L Final    Troponin T Numeric Delta 02/16/2025     Final    Unable to calculate.     Results  Imaging  Head CT came back normal at the ER.    Testing  EKG shows no significant abnormalities.    Results review: During today's encounter, all relevant clinical data has been reviewed.      Assessment and Plan  Diagnoses and all orders for this visit:    1. Other chest pain (Primary)  -     Ambulatory Referral to Cardiology    2. Benign essential HTN    3. Shortness of breath    4. Chronic low back pain with sciatica, sciatica laterality unspecified, unspecified back pain laterality    Other orders  -     ECG Scan      Assessment & Plan  1. Chest pain.  The EKG results do not indicate any significant abnormalities. However, the possibility of a non-STEMI heart attack can not be ruled out. His symptoms of dyspnea and chest pain could potentially be attributed to his elevated blood pressure. The visual disturbances he reported could also be a consequence of his hypertension. His fatigue and shortness of breath could be indicative of a myocardial infarction. His persistent fatigue and shortness of breath, along with the swelling in his legs, could be suggestive of heart failure. His thyroid function may be contributing to his overall health status. A referral to cardiology will be made for further evaluation. He is advised to seek immediate medical attention at the ER if he experiences weakness, slurred speech, disorientation, or loss of control over his arms or legs, as these could be signs of a stroke. After discussion, patient agreeable and feels best he go to ED today to rule out life threatening etiology.     2. Hypertension.   He is advised to continue with his current medication regimen. A follow-up appointment with cardiology has been scheduled for Monday at 3:00 PM. He is also advised to monitor his blood pressure regularly and report any significant changes.    3. Shortness of breath.  His shortness of breath, particularly when ambulating, could be a symptom of a  pulmonary embolism. His history of smoking increases his risk for this condition. He is advised to inform the ER about his shortness of breath and smoking history. A suggested CT scan of his chest and a D-dimer test should be ordered to rule out a pulmonary embolism.    4. Back pain.  Will further work up at up coming appt.         Follow-up  The patient is scheduled for a follow-up visit on Wednesday.    PROCEDURE  The patient has previously received injections in his neck for pain management, which were effective. He also received similar injections in his lower back, which did not provide relief.    Class 3 Severe Obesity (BMI >=40). Obesity-related health conditions include the following: obstructive sleep apnea, hypertension, coronary heart disease, diabetes mellitus, and impaired fasting glucose. Obesity is unchanged. BMI is is above average; BMI management plan is completed. We discussed portion control and increasing exercise.       New Medications:   No orders of the defined types were placed in this encounter.      Discontinued Medications:   Medications Discontinued During This Encounter   Medication Reason    Testosterone Cypionate (Depo-Testosterone) 200 MG/ML injection *Therapy completed    Syringe, Disposable, 3 ML misc *Therapy completed              Visit Diagnoses:    ICD-10-CM ICD-9-CM   1. Other chest pain  R07.89 786.59   2. Benign essential HTN  I10 401.1   3. Shortness of breath  R06.02 786.05   4. Chronic low back pain with sciatica, sciatica laterality unspecified, unspecified back pain laterality  M54.40 724.2    G89.29 724.3     338.29            Follow Up:   Return in about 5 days (around 2/26/2025).    Patient was given instructions and counseling regarding his condition or for health maintenance advice. Please see specific information pulled into the AVS if appropriate.     Patient or patient representative verbalized consent for the use of Ambient Listening during the visit with   ALONDRA Ward for chart documentation. 2/25/2025  21:33 EST      This document has been electronically signed by ALONDRA Ward   February 25, 2025 21:33 EST    Dictated Utilizing Dragon Dictation: Part of this note may be an electronic transcription/translation of spoken language to printed text using the Dragon Dictation System.

## 2025-02-24 ENCOUNTER — PATIENT ROUNDING (BHMG ONLY) (OUTPATIENT)
Dept: FAMILY MEDICINE CLINIC | Facility: CLINIC | Age: 45
End: 2025-02-24
Payer: COMMERCIAL

## 2025-02-26 ENCOUNTER — TRANSITIONAL CARE MANAGEMENT TELEPHONE ENCOUNTER (OUTPATIENT)
Dept: CALL CENTER | Facility: HOSPITAL | Age: 45
End: 2025-02-26
Payer: COMMERCIAL

## 2025-02-26 ENCOUNTER — READMISSION MANAGEMENT (OUTPATIENT)
Dept: CALL CENTER | Facility: HOSPITAL | Age: 45
End: 2025-02-26
Payer: COMMERCIAL

## 2025-02-26 DIAGNOSIS — F32.A ANXIETY AND DEPRESSION: ICD-10-CM

## 2025-02-26 DIAGNOSIS — F41.9 ANXIETY AND DEPRESSION: ICD-10-CM

## 2025-02-26 RX ORDER — VENLAFAXINE HYDROCHLORIDE 75 MG/1
CAPSULE, EXTENDED RELEASE ORAL
Qty: 90 CAPSULE | Refills: 0 | Status: CANCELLED | OUTPATIENT
Start: 2025-02-26

## 2025-02-26 NOTE — TELEPHONE ENCOUNTER
Can we verify is he currently taking this medication at that current dose? He hasn't missed any doses? The 225mg daily? Because if he is just starting it back out we may need to start at a lower dose and work our way up

## 2025-02-26 NOTE — OUTREACH NOTE
Prep Survey      Flowsheet Row Responses   [facility] facility patient discharged from? Non-BH   Is LACE score < 7 ? Non-BH Discharge   Eligibility TCM Hospital [facility]   Discharge Disposition Home or Self Care   Discharge diagnosis Chest pain   Does the patient have one of the following disease processes/diagnoses(primary or secondary)? Other   Prep survey completed? Yes            Stephanie RODRIGUEZ - Registered Nurse

## 2025-02-26 NOTE — TELEPHONE ENCOUNTER
Caller: Brady Mcknight    Relationship: Self    Best call back number: [phone]     Requested Prescriptions:   Requested Prescriptions     Pending Prescriptions Disp Refills    venlafaxine XR (EFFEXOR-XR) 75 MG 24 hr capsule 90 capsule 0        Pharmacy where request should be sent: [place] [id number] [address] - [phone]  - [phone] FX     Last office visit with prescribing clinician: 2/21/2025   Last telemedicine visit with prescribing clinician: Visit date not found   Next office visit with prescribing clinician: 2/28/2025     Additional details provided by patient: PATIENT OUT OF MEDICATION.  TAKES 225 MG QD    Does the patient have less than a 3 day supply:  [x] Yes  [] No    Would you like a call back once the refill request has been completed: [] Yes [x] No    If the office needs to give you a call back, can they leave a voicemail: [] Yes [x] No    Janet Thomas   02/26/25 10:47 EST

## 2025-02-26 NOTE — OUTREACH NOTE
Call Center TCM Note      Flowsheet Row Responses   [facility] [place] patient discharged from? Non-  [[facility]]   Does the patient have one of the following disease processes/diagnoses(primary or secondary)? Other   TCM attempt successful? Yes   Call start time 1157   Call end time 1201   Discharge diagnosis Chest pain   Person spoke with today (if not patient) and relationship Patient   Meds reviewed with patient/caregiver? Yes  [Patient reports blood pressure medicine changed but he doesnt have it in front of him to give name. He will bring updated med list to appt on Friday]   Does the patient have all medications ordered at discharge? Yes   Prescription comments Patient reports that he needs PCP office to order his effexor XR. He clarified that his dose in 225mg per day.   Is the patient taking all medications as directed (includes completed medication regime)? Yes   Comments PCP Petra CANTU. Hospital follow up appt in place for 2/28  1030am.   Does the patient have an appointment with their PCP within 7-14 days of discharge? Yes   Has home health visited the patient within 72 hours of discharge? N/A   Psychosocial issues? No   Comments Patient to monitor blood pressure daily and keep record.   Did the patient receive a copy of their discharge instructions? Yes   What is the patient's perception of their health status since discharge? Improving   Is the patient/caregiver able to teach back signs and symptoms related to disease process for when to call PCP? Yes   Is the patient/caregiver able to teach back signs and symptoms related to disease process for when to call 911? Yes   Is the patient/caregiver able to teach back the hierarchy of who to call/visit for symptoms/problems? PCP, Specialist, Home health nurse, Urgent Care, ED, 911 Yes   TCM call completed? Yes   Wrap up additional comments Patient reports that he has follow up with cardiology in place and going  to have a stress test.   Call end time 1201   Would this patient benefit from a Referral to [facility]? No   Is the patient interested in additional calls from an ambulatory ? No            Cheyanne Sawyer RN    2/26/2025, 12:04 EST

## 2025-02-26 NOTE — OUTREACH NOTE
Call Center TCM Note      Flowsheet Row Responses   [facility] patient discharged from? Non-  [[facility]]   Does the patient have one of the following disease processes/diagnoses(primary or secondary)? Other   TCM attempt successful? No   Unsuccessful attempts Attempt 1            Cheyanne Sawyer RN    2/26/2025, 11:10 EST

## 2025-02-27 RX ORDER — VENLAFAXINE HYDROCHLORIDE 225 MG/1
225 TABLET, EXTENDED RELEASE ORAL
Qty: 90 EACH | Refills: 1 | Status: SHIPPED | OUTPATIENT
Start: 2025-02-27

## 2025-02-27 NOTE — TELEPHONE ENCOUNTER
Spoke with patient he reports he has been taking 225 mg daily but has been out ever since he got out of the hospital.

## 2025-02-28 ENCOUNTER — OFFICE VISIT (OUTPATIENT)
Dept: FAMILY MEDICINE CLINIC | Facility: CLINIC | Age: 45
End: 2025-02-28
Payer: MEDICAID

## 2025-02-28 VITALS
WEIGHT: 315 LBS | DIASTOLIC BLOOD PRESSURE: 102 MMHG | BODY MASS INDEX: 46.65 KG/M2 | SYSTOLIC BLOOD PRESSURE: 142 MMHG | HEIGHT: 69 IN | TEMPERATURE: 96.9 F | OXYGEN SATURATION: 96 % | HEART RATE: 79 BPM

## 2025-02-28 DIAGNOSIS — M54.16 LUMBAR RADICULOPATHY: ICD-10-CM

## 2025-02-28 DIAGNOSIS — E06.3 HYPOTHYROIDISM DUE TO HASHIMOTO THYROIDITIS: ICD-10-CM

## 2025-02-28 DIAGNOSIS — I10 PRIMARY HYPERTENSION: Primary | ICD-10-CM

## 2025-02-28 DIAGNOSIS — H70.92 MASTOIDITIS OF LEFT SIDE: ICD-10-CM

## 2025-02-28 DIAGNOSIS — R79.89 LOW TESTOSTERONE IN MALE: ICD-10-CM

## 2025-02-28 RX ORDER — KETOROLAC TROMETHAMINE 30 MG/ML
30 INJECTION, SOLUTION INTRAMUSCULAR; INTRAVENOUS ONCE
Status: COMPLETED | OUTPATIENT
Start: 2025-02-28 | End: 2025-02-28

## 2025-02-28 RX ORDER — BACLOFEN 5 MG/1
5 TABLET ORAL EVERY 8 HOURS SCHEDULED
COMMUNITY
Start: 2025-02-24 | End: 2025-03-26

## 2025-02-28 RX ORDER — AMLODIPINE BESYLATE 10 MG/1
10 TABLET ORAL DAILY
COMMUNITY
Start: 2025-02-25 | End: 2025-03-27

## 2025-02-28 RX ORDER — DEXAMETHASONE SODIUM PHOSPHATE 4 MG/ML
8 INJECTION, SOLUTION INTRA-ARTICULAR; INTRALESIONAL; INTRAMUSCULAR; INTRAVENOUS; SOFT TISSUE ONCE
Status: COMPLETED | OUTPATIENT
Start: 2025-02-28 | End: 2025-02-28

## 2025-02-28 RX ORDER — LEVOTHYROXINE SODIUM 50 UG/1
50 TABLET ORAL
COMMUNITY
Start: 2025-02-25 | End: 2025-03-27

## 2025-02-28 RX ORDER — METOPROLOL TARTRATE 50 MG
50 TABLET ORAL 2 TIMES DAILY
COMMUNITY
Start: 2025-02-24 | End: 2025-03-26

## 2025-02-28 RX ORDER — ASPIRIN 81 MG/1
81 TABLET ORAL DAILY
COMMUNITY
Start: 2025-02-25 | End: 2025-03-27

## 2025-02-28 RX ADMIN — KETOROLAC TROMETHAMINE 30 MG: 30 INJECTION, SOLUTION INTRAMUSCULAR; INTRAVENOUS at 12:09

## 2025-02-28 RX ADMIN — DEXAMETHASONE SODIUM PHOSPHATE 8 MG: 4 INJECTION, SOLUTION INTRA-ARTICULAR; INTRALESIONAL; INTRAMUSCULAR; INTRAVENOUS; SOFT TISSUE at 12:08

## 2025-03-05 ENCOUNTER — TELEPHONE (OUTPATIENT)
Dept: FAMILY MEDICINE CLINIC | Facility: CLINIC | Age: 45
End: 2025-03-05
Payer: MEDICAID

## 2025-03-05 DIAGNOSIS — R32 URINARY INCONTINENCE, UNSPECIFIED TYPE: ICD-10-CM

## 2025-03-05 DIAGNOSIS — M54.16 LUMBAR RADICULOPATHY: Primary | ICD-10-CM

## 2025-03-05 NOTE — PROGRESS NOTES
Transitional Care Follow Up Visit  Subjective     Brady Mcknight is a 44 y.o. male who presents for a transitional care management visit.    Within 48 business hours after discharge our office contacted him via telephone to coordinate his care and needs.      I reviewed and discussed the details of that call along with the discharge summary, hospital problems, inpatient lab results, inpatient diagnostic studies, and consultation reports with Brady.     Current outpatient and discharge medications have been reconciled for the patient.  Reviewed by: ALONDRA Ward          2/26/2025    10:36 AM   Date of TCM Phone Call   Hospital [facility]   Discharge Disposition Home or Self Care     Risk for Readmission (LACE) No data recorded    History of Present Illness   Course During Hospital Stay:  Patient was admitted through [facility] ER on February 21 and then discharged on February 24 for chest pain and high blood pressure.  He was seen by cardiologist who recommended an outpatient stress test be performed as he was nonfasting on his last day in the hospital.  CT of chest was performed and was negative for PE dissection aneurysm or pneumonia.  They did note a small hiatal hernia.  Echo performed showed EF of 60%.  Prior to leaving the hospital his hypertension had improved.  He also had a CT scan of his brain that showed severe left mastoid effusion and possible mastoiditis so he was given Augmentin 875 mg p.o. twice daily.  His TSH was 84 and he was started on 50 mcg daily.  His metoprolol 50 mg was increased to twice per day and amlodipine increased to 10 mg daily while in the hospital.  History of Present Illness  The patient presents for evaluation of hypertension, left mastoid effusion, hypothyroidism, testosterone deficiency, and back pain.    He reports a persistent elevation in his blood pressure, despite some improvement. He was admitted to [facility] on  02/21/2025 due to this issue, but he did not have a heart attack. He was discharged on 02/24/2025. He has been experiencing significant fatigue and severe headaches, which he attributes to his elevated blood pressure. His blood pressure remains high even after completing a course of antibiotics. He is scheduled for a stress test on Tuesday. He is concerned about his upcoming [facility] physical examination on 03/04/2025, given his current blood pressure readings. He has been monitoring his blood pressure at home for the past 3 days. He has made dietary modifications, including consuming Cheerios for breakfast, avoiding salt, and refraining from fried foods and fast food. His blood pressure medication regimen was adjusted during his hospital stay, with lisinopril being discontinued. He is currently on metoprolol 50 mg and amlodipine 10 mg.    A recent CT scan revealed an infection in the bone of his ear, which is suspected to be contributing to his elevated blood pressure and severe headaches. He reports no ear pain but notes a decrease in hearing. He also reports neck and shoulder pain, which he believes may be related to the ear infection. He has undergone multiple head CT scans due to his persistent severe headaches. He reports some improvement in his symptoms since starting the antibiotic treatment. He is currently on Augmentin for the ear infection.    He was not on thyroid medication during his hospital stay but has since resumed taking levothyroxine 50 mcg. He was previously on a higher dose of 150 mcg. He was informed that his thyroid was non-functional.    He has been seeing  for testosterone replacement therapy, which he reports has significantly improved his energy levels.    He has been experiencing back pain for several years, which he believes may be due to a ruptured disc. He has previously received injections for neck and arm pain, which provided relief. However, similar treatment for his back pain was  "ineffective. His back pain has worsened over time, and he now experiences leg numbness and severe pain. He was administered morphine every 4 hours during his hospital stay. He also reports occasional bladder incontinence. He has not undergone any recent imaging studies for his back but has had a couple of MRIs in the past. He has been dealing with this issue for approximately 20 years. He has been prescribed hydrocodone for his back pain in the past.    MEDICATIONS  Current: metoprolol, amlodipine, Augmentin, aspirin, baclofen, levothyroxine, Effexor, Excedrin Migraine  Discontinued: lisinopril      The following portions of the patient's history were reviewed and updated as appropriate: allergies, current medications, past family history, past medical history, past social history, past surgical history, and problem list.    Review of Systems   Eyes: Negative.    Endocrine: Negative.    Allergic/Immunologic: Negative.    Neurological: Negative.        Objective   BP (!) 142/102 (BP Location: Right arm, Patient Position: Sitting)   Pulse 79   Temp 96.9 °F (36.1 °C)   Ht 175.3 cm (69.02\")   Wt (!) 155 kg (342 lb 12.8 oz)   SpO2 96%   BMI 50.60 kg/m²   Physical Exam  Vitals reviewed.   Constitutional:       Appearance: Normal appearance.   HENT:      Head: Normocephalic.      Comments: No swelling or tenderness behind left ear      Right Ear: Tympanic membrane and ear canal normal.      Left Ear: Tympanic membrane normal.      Ears:      Comments: No signs of infection but left canal appears more narrowed than right      Nose: Nose normal.      Mouth/Throat:      Mouth: Mucous membranes are moist.      Pharynx: Oropharynx is clear.   Eyes:      Extraocular Movements: Extraocular movements intact.      Conjunctiva/sclera: Conjunctivae normal.   Cardiovascular:      Rate and Rhythm: Normal rate.      Heart sounds: Normal heart sounds.   Pulmonary:      Effort: Pulmonary effort is normal.      Breath sounds: Normal " breath sounds.   Abdominal:      General: Bowel sounds are normal.      Palpations: Abdomen is soft.   Musculoskeletal:      Lumbar back: Tenderness present. Decreased range of motion. Positive right straight leg raise test and positive left straight leg raise test.   Skin:     General: Skin is warm and dry.   Neurological:      Mental Status: He is alert. Mental status is at baseline.      Comments: Normal gait    Psychiatric:         Mood and Affect: Mood normal.         Assessment & Plan   Problems Addressed this Visit       Low testosterone in male    Relevant Orders    Ambulatory Referral to Urology     Other Visit Diagnoses       Primary hypertension    -  Primary    Relevant Medications    amLODIPine (NORVASC) 10 MG tablet    metoprolol tartrate (LOPRESSOR) 50 MG tablet    Mastoiditis of left side        Relevant Orders    Ambulatory Referral to ENT (Otolaryngology) (Completed)    Disorder of left mastoid        Hypothyroidism due to Hashimoto thyroiditis        Relevant Medications    levothyroxine (SYNTHROID, LEVOTHROID) 50 MCG tablet    metoprolol tartrate (LOPRESSOR) 50 MG tablet    ketorolac (TORADOL) injection 30 mg (Completed)    dexAMETHasone (DECADRON) injection 8 mg (Completed)    Lumbar radiculopathy        Relevant Medications    ketorolac (TORADOL) injection 30 mg (Completed)    dexAMETHasone (DECADRON) injection 8 mg (Completed)    Other Relevant Orders    XR Spine Lumbar 2 or 3 View (In Office) (Completed)    Ambulatory Referral to Pain Management          Diagnoses         Codes Comments    Primary hypertension    -  Primary ICD-10-CM: I10  ICD-9-CM: 401.9     Mastoiditis of left side     ICD-10-CM: H70.92  ICD-9-CM: 383.9     Disorder of left mastoid     ICD-10-CM: H74.92  ICD-9-CM: 385.9     Hypothyroidism due to Hashimoto thyroiditis     ICD-10-CM: E06.3  ICD-9-CM: 245.2     Low testosterone in male     ICD-10-CM: R79.89  ICD-9-CM: 790.99     Lumbar radiculopathy     ICD-10-CM:  M54.16  ICD-9-CM: 724.4             Assessment & Plan  1. Hypertension.  His blood pressure has shown improvement since the last visit, with a current reading of 142/102, down from 162. He is currently taking metoprolol 50 mg and amlodipine 10 mg. He is advised to continue his current medication regimen without any changes at this time. He is instructed to inquire about his cardiology appointment during his upcoming stress test.     2. Left mastoid effusion.  He is currently on Augmentin for the infection. The left ear canal appears narrower than the right. A referral to an ENT specialist will be made for further evaluation and potential re-scanning to ensure the infection is completely resolved.    3. Hypothyroidism.  He was restarted on levothyroxine 50 mcg during his hospital stay. His fatigue may be related to his hypothyroidism. He is advised to continue his current dosage of levothyroxine. We will repeat labs in about 4-6 weeks.     4. Testosterone deficiency.  A referral to a specialist for testosterone replacement therapy will be made based on his past history.    5. Back pain.  He reports severe lower back pain with associated leg numbness and occasional bladder incontinence. An x-ray of the lower back will be ordered today, followed by an MRI. A referral to neurosurgery will be made for further evaluation and potential surgical intervention. He is advised to seek immediate medical attention at the ER if he experiences complete loss of bowel or bladder control.    Follow-up  The patient is scheduled for a follow-up visit in 4 weeks, during which repeat labs will be conducted.    Patient or patient representative verbalized consent for the use of Ambient Listening during the visit with  ALONDRA Ward for chart documentation. 3/5/2025  10:29 EST     PROCEDURE  The patient has previously received injections for neck and arm pain, which provided relief.

## 2025-03-05 NOTE — PROGRESS NOTES
Can we let him know that we have ordered the MRI to be completed for him in the next day or so due to the worsening pain and symptoms of incontinence. His xray showed degenerative changes in regard to disc and arthritis in some areas, so the MRI will give us a better picture. 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

## 2025-03-05 NOTE — TELEPHONE ENCOUNTER
----- Message from Petra Londono sent at 3/5/2025  5:31 PM EST -----      Can we let him know that we have ordered the MRI to be completed for him in the next day or so due to the worsening pain and symptoms of incontinence. His xray showed degenerative changes in regard to disc and arthritis in some areas, so the MRI will give us a better picture. 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

## 2025-03-05 NOTE — TELEPHONE ENCOUNTER
PATIENT NOTIFIED AND STATES THAT THE DOT APPOINTMENT WENT GOOD THEY DID TELL HIM THAT HE NEEDED A SLEEP APNEA TEST AND HIS PRIMARY WOULD NEED TO ORDER IT.

## 2025-03-06 ENCOUNTER — HOSPITAL ENCOUNTER (OUTPATIENT)
Dept: MRI IMAGING | Facility: HOSPITAL | Age: 45
Discharge: HOME | End: 2025-03-06
Admitting: NURSE PRACTITIONER
Payer: MEDICAID

## 2025-03-06 DIAGNOSIS — R32 URINARY INCONTINENCE, UNSPECIFIED TYPE: ICD-10-CM

## 2025-03-06 DIAGNOSIS — M54.16 LUMBAR RADICULOPATHY: ICD-10-CM

## 2025-03-06 PROCEDURE — 25510000002 GADOBENATE DIMEGLUMINE 529 MG/ML SOLUTION: Performed by: NURSE PRACTITIONER

## 2025-03-06 PROCEDURE — 72158 MRI LUMBAR SPINE W/O & W/DYE: CPT

## 2025-03-06 PROCEDURE — A9577 INJ MULTIHANCE: HCPCS | Performed by: NURSE PRACTITIONER

## 2025-03-06 RX ADMIN — GADOBENATE DIMEGLUMINE 20 ML: 529 INJECTION, SOLUTION INTRAVENOUS at 13:03

## 2025-03-07 ENCOUNTER — TELEPHONE (OUTPATIENT)
Dept: FAMILY MEDICINE CLINIC | Facility: CLINIC | Age: 45
End: 2025-03-07

## 2025-03-07 DIAGNOSIS — M48.061 SPINAL STENOSIS OF LUMBAR REGION AT MULTIPLE LEVELS: ICD-10-CM

## 2025-03-07 DIAGNOSIS — M47.816 FACET ARTHROPATHY, LUMBAR: ICD-10-CM

## 2025-03-07 DIAGNOSIS — M51.369 L4-L5 DISC BULGE: Primary | ICD-10-CM

## 2025-03-07 NOTE — TELEPHONE ENCOUNTER
Ok let him know to remind me at his next appt and we will get that ordered for him so he can have that evaluation

## 2025-03-07 NOTE — TELEPHONE ENCOUNTER
Spoke with patient he stated he has done PT,Epidural Injections in the past would like to discuss with you in person,scheduled for this afternoon.

## 2025-03-07 NOTE — TELEPHONE ENCOUNTER
----- Message from Petra Londono sent at 3/7/2025  1:18 PM EST -----      Can we let him know we will further discuss at his next appt, and if he wants to come in sooner he can, or we can do a telehealth appt. It may be beneficial to do PT as well as Neurosurgery consult. I will go ahead and refer to Neurosurgery today if he would like to get that process started. Thank you

## 2025-03-07 NOTE — PROGRESS NOTES
Can we let him know we will further discuss at his next appt, and if he wants to come in sooner he can, or we can do a telehealth appt. It may be beneficial to do PT as well as Neurosurgery consult. I will go ahead and refer to Neurosurgery today if he would like to get that process started. Thank you

## 2025-03-13 ENCOUNTER — OFFICE VISIT (OUTPATIENT)
Dept: FAMILY MEDICINE CLINIC | Facility: CLINIC | Age: 45
End: 2025-03-13
Payer: MEDICAID

## 2025-03-13 VITALS
HEART RATE: 84 BPM | BODY MASS INDEX: 46.65 KG/M2 | HEIGHT: 69 IN | TEMPERATURE: 98 F | SYSTOLIC BLOOD PRESSURE: 138 MMHG | OXYGEN SATURATION: 99 % | WEIGHT: 315 LBS | DIASTOLIC BLOOD PRESSURE: 88 MMHG

## 2025-03-13 DIAGNOSIS — M54.16 LUMBAR RADICULOPATHY: Primary | ICD-10-CM

## 2025-03-13 DIAGNOSIS — R40.0 DAYTIME SLEEPINESS: ICD-10-CM

## 2025-03-13 DIAGNOSIS — I10 PRIMARY HYPERTENSION: ICD-10-CM

## 2025-03-13 PROCEDURE — 3079F DIAST BP 80-89 MM HG: CPT | Performed by: NURSE PRACTITIONER

## 2025-03-13 PROCEDURE — 99214 OFFICE O/P EST MOD 30 MIN: CPT | Performed by: NURSE PRACTITIONER

## 2025-03-13 PROCEDURE — 1160F RVW MEDS BY RX/DR IN RCRD: CPT | Performed by: NURSE PRACTITIONER

## 2025-03-13 PROCEDURE — 1159F MED LIST DOCD IN RCRD: CPT | Performed by: NURSE PRACTITIONER

## 2025-03-13 PROCEDURE — 3075F SYST BP GE 130 - 139MM HG: CPT | Performed by: NURSE PRACTITIONER

## 2025-03-13 PROCEDURE — 1125F AMNT PAIN NOTED PAIN PRSNT: CPT | Performed by: NURSE PRACTITIONER

## 2025-03-13 RX ORDER — GABAPENTIN 100 MG/1
100 CAPSULE ORAL 2 TIMES DAILY
Qty: 28 CAPSULE | Refills: 0 | Status: SHIPPED | OUTPATIENT
Start: 2025-03-13

## 2025-03-13 RX ORDER — LISINOPRIL 5 MG/1
5 TABLET ORAL DAILY
COMMUNITY
Start: 2025-03-07 | End: 2026-03-07

## 2025-03-13 NOTE — PROGRESS NOTES
[facility]     Chief Complaint  Back Pain and Results (MRI results)    Brady Mcknight is a 44 y.o. male who presents today to [facility] for Back Pain and Results (MRI results).    HPI:   HPI   History of Present Illness  The patient presents for evaluation of back pain.    He has been experiencing severe back pain, which has rendered him unable to work for the past 2 months. He reports that his pain is so intense that he can only sleep for approximately 2 hours before needing to get up and walk around. He finds it difficult to maintain a standing position throughout the day due to the pain. 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 He has undergone physical therapy on 2 or 3 occasions and has received injections twice, neither of which have provided relief. He was previously diagnosed with a pinched nerve in his neck, which caused issues with his hand and arm. An injection resolved these symptoms, and he has not experienced any related problems for the past 2 years. However, similar treatment for his back pain has been ineffective. Despite the lack of relief from physical therapy, he continues to perform the exercises and stretches he learned, which he finds minimally beneficial. He has tried various medications in the past, including gabapentin, hydrocodone 10 mg, tramadol, ibuprofen, and an unidentified anti-inflammatory drug. While some of these medications provided temporary relief, none have completely alleviated his pain. He also takes muscle relaxers at night, which he finds helpful. He has not consulted a doctor in the past 3 years due to loss of insurance coverage. He reports no history of substance abuse or alcohol use. He has a history of degenerative disc disease in the L4, L5, and S1 regions, diagnosed approximately 15 years ago.    He has been advised to undergo testing for sleep apnea, but  he is uncertain about the necessity of this test. He has expressed reluctance to use a CPAP machine, even if diagnosed with sleep apnea. He acknowledges that his age and weight may be contributing factors to his condition.    He monitors his blood pressure daily, which consistently reads in the 120s over 80s range.    SOCIAL HISTORY  He does not report alcohol intake or drug use.        Previous History:   Past Medical History:   Diagnosis Date    Anger     Anxiety     Arthritis     Chronic back pain     Depression     Fatigue     History of heart murmur in childhood     Hyperlipidemia     Hypertension     Hypothyroidism     Obesity     Positive urine drug screen 03/2016    Screening PSA (prostate specific antigen) 2014      Past Surgical History:   Procedure Laterality Date    ABDOMINAL SURGERY      CHOLECYSTECTOMY      CHOLECYSTECTOMY WITH INTRAOPERATIVE CHOLANGIOGRAM N/A 9/5/2017    Procedure: CHOLECYSTECTOMY LAPAROSCOPIC INTRAOPERATIVE CHOLANGIOGRAM;  Surgeon: Joni Dawn MD;  Location: [facility] OR;  Service:     COLONOSCOPY N/A 1/4/2018    Procedure: COLONOSCOPY CPT CODE: 29411;  Surgeon: Remington Burroughs III, MD;  Location: [facility] OR;  Service:     GASTRIC BANDING  08/21/2016    VASECTOMY      WISDOM TOOTH EXTRACTION        Social History     Socioeconomic History    Marital status:    Tobacco Use    Smoking status: Never    Smokeless tobacco: Current     Types: Snuff   Vaping Use    Vaping status: Every Day    Start date: 12/1/2024    Substances: Nicotine    Devices: Disposable   Substance and Sexual Activity    Alcohol use: Yes     Alcohol/week: 30.0 standard drinks of alcohol     Types: 30 Cans of beer per week     Comment: Drinks a 30 pack of beer and one bottle of whiskey  per week.    Drug use: No    Sexual activity: Defer      Health Maintenance Due   Topic Date Due    TDAP/TD VACCINES (1 - Tdap) Never done    HEPATITIS C SCREENING  Never done    ANNUAL PHYSICAL  Never done    LIPID PANEL  " 12/28/2021    COVID-19 Vaccine (3 - 2024-25 season) 09/01/2024        Current Medications:  Current Outpatient Medications   Medication Sig Dispense Refill    amLODIPine (NORVASC) 10 MG tablet Take 1 tablet by mouth Daily.      aspirin 81 MG EC tablet Take 1 tablet by mouth Daily.      Baclofen (LIORESAL) 5 MG tablet Take 1 tablet by mouth Every 8 (Eight) Hours.      levothyroxine (SYNTHROID, LEVOTHROID) 50 MCG tablet Take 1 tablet by mouth Every Morning.      lisinopril (PRINIVIL,ZESTRIL) 5 MG tablet Take 1 tablet by mouth Daily.      metoprolol tartrate (LOPRESSOR) 50 MG tablet Take 1 tablet by mouth 2 (Two) Times a Day.      venlafaxine 225 MG tablet sustained-release 24 hour 24 hr tablet Take 1 tablet by mouth Daily With Breakfast. 90 each 1    gabapentin (NEURONTIN) 100 MG capsule Take 1 capsule by mouth 2 (Two) Times a Day. 28 capsule 0     No current facility-administered medications for this visit.       Allergies:   No Known Allergies    Vitals:   /88 (BP Location: Right arm, Patient Position: Sitting)   Pulse 84   Temp 98 °F (36.7 °C) (Temporal)   Ht 175.3 cm (69.02\")   Wt (!) 159 kg (349 lb 12.8 oz)   SpO2 99%   BMI 51.63 kg/m²   Estimated body mass index is 51.63 kg/m² as calculated from the following:    Height as of this encounter: 175.3 cm (69.02\").    Weight as of this encounter: 159 kg (349 lb 12.8 oz).               Physical Exam:   Physical Exam  Vitals reviewed.   Constitutional:       Appearance: Normal appearance.   HENT:      Head: Normocephalic.   Eyes:      Extraocular Movements: Extraocular movements intact.      Conjunctiva/sclera: Conjunctivae normal.   Cardiovascular:      Rate and Rhythm: Normal rate.      Heart sounds: Normal heart sounds.   Pulmonary:      Effort: Pulmonary effort is normal.      Breath sounds: Normal breath sounds.   Abdominal:      General: Bowel sounds are normal.      Palpations: Abdomen is soft.   Musculoskeletal:      Lumbar back: Tenderness " present. Decreased range of motion. Positive right straight leg raise test and positive left straight leg raise test.   Skin:     General: Skin is warm and dry.   Neurological:      Mental Status: He is alert. Mental status is at baseline.      Comments: Normal gait    Psychiatric:         Mood and Affect: Mood normal.        Physical Exam         Lab Results:   Admission on 02/16/2025, Discharged on 02/16/2025   Component Date Value Ref Range Status    QT Interval 02/16/2025 386  ms Final    QTC Interval 02/16/2025 445  ms Final    Glucose 02/16/2025 88  65 - 99 mg/dL Final    BUN 02/16/2025 14  6 - 20 mg/dL Final    Creatinine 02/16/2025 0.94  0.76 - 1.27 mg/dL Final    Sodium 02/16/2025 140  136 - 145 mmol/L Final    Potassium 02/16/2025 3.9  3.5 - 5.2 mmol/L Final    Chloride 02/16/2025 102  98 - 107 mmol/L Final    CO2 02/16/2025 29.3 (H)  22.0 - 29.0 mmol/L Final    Calcium 02/16/2025 9.5  8.6 - 10.5 mg/dL Final    Total Protein 02/16/2025 7.6  6.0 - 8.5 g/dL Final    Albumin 02/16/2025 4.5  3.5 - 5.2 g/dL Final    ALT (SGPT) 02/16/2025 19  1 - 41 U/L Final    AST (SGOT) 02/16/2025 23  1 - 40 U/L Final    Alkaline Phosphatase 02/16/2025 86  39 - 117 U/L Final    Total Bilirubin 02/16/2025 0.4  0.0 - 1.2 mg/dL Final    Globulin 02/16/2025 3.1  gm/dL Final    A/G Ratio 02/16/2025 1.5  g/dL Final    BUN/Creatinine Ratio 02/16/2025 14.9  7.0 - 25.0 Final    Anion Gap 02/16/2025 8.7  5.0 - 15.0 mmol/L Final    eGFR 02/16/2025 101.2  >60.0 mL/min/1.73 Final    HS Troponin T 02/16/2025 <6  <22 ng/L Final    Extra Tube 02/16/2025 Hold for add-ons.   Final    Auto resulted.    Extra Tube 02/16/2025 hold for add-on   Final    Auto resulted    Extra Tube 02/16/2025 Hold for add-ons.   Final    Auto resulted.    Extra Tube 02/16/2025 Hold for add-ons.   Final    Auto resulted    WBC 02/16/2025 8.19  3.40 - 10.80 10*3/mm3 Final    RBC 02/16/2025 5.02  4.14 - 5.80 10*6/mm3 Final    Hemoglobin 02/16/2025 15.0  13.0 - 17.7  g/dL Final    Hematocrit 02/16/2025 45.0  37.5 - 51.0 % Final    MCV 02/16/2025 89.6  79.0 - 97.0 fL Final    MCH 02/16/2025 29.9  26.6 - 33.0 pg Final    MCHC 02/16/2025 33.3  31.5 - 35.7 g/dL Final    RDW 02/16/2025 12.9  12.3 - 15.4 % Final    RDW-SD 02/16/2025 41.9  37.0 - 54.0 fl Final    MPV 02/16/2025 9.1  6.0 - 12.0 fL Final    Platelets 02/16/2025 309  140 - 450 10*3/mm3 Final    Neutrophil % 02/16/2025 49.1  42.7 - 76.0 % Final    Lymphocyte % 02/16/2025 36.4  19.6 - 45.3 % Final    Monocyte % 02/16/2025 8.2  5.0 - 12.0 % Final    Eosinophil % 02/16/2025 5.0  0.3 - 6.2 % Final    Basophil % 02/16/2025 0.9  0.0 - 1.5 % Final    Immature Grans % 02/16/2025 0.4  0.0 - 0.5 % Final    Neutrophils, Absolute 02/16/2025 4.03  1.70 - 7.00 10*3/mm3 Final    Lymphocytes, Absolute 02/16/2025 2.98  0.70 - 3.10 10*3/mm3 Final    Monocytes, Absolute 02/16/2025 0.67  0.10 - 0.90 10*3/mm3 Final    Eosinophils, Absolute 02/16/2025 0.41 (H)  0.00 - 0.40 10*3/mm3 Final    Basophils, Absolute 02/16/2025 0.07  0.00 - 0.20 10*3/mm3 Final    Immature Grans, Absolute 02/16/2025 0.03  0.00 - 0.05 10*3/mm3 Final    nRBC 02/16/2025 0.0  0.0 - 0.2 /100 WBC Final    HS Troponin T 02/16/2025 <6  <22 ng/L Final    Troponin T Numeric Delta 02/16/2025    Final    Unable to calculate.     Results  Imaging  MRI shows disc changes in the back, including disc bulges and thickened ligaments causing narrowing in the areas where nerves exit the spine. Some bones in the spine are shorter than typical.    Results review: During today's encounter, all relevant clinical data has been reviewed.      Assessment and Plan  Diagnoses and all orders for this visit:    1. Lumbar radiculopathy (Primary)  -     gabapentin (NEURONTIN) 100 MG capsule; Take 1 capsule by mouth 2 (Two) Times a Day.  Dispense: 28 capsule; Refill: 0    2. Daytime sleepiness  -     Home Sleep Study; Future    3. Primary hypertension      Assessment & Plan  1. Back pain.  The  patient's back pain is likely due to disc changes and bulges, which have resulted in ligament thickening and subsequent narrowing of the nerve exit points from the spine. This could be causing nerve compression, leading to symptoms such as shooting pains, numbness, tingling, and a sensation of leg weakness. Additionally, some spinal bones are shorter than average, possibly due to repeated trauma or genetic factors. A referral to neurosurgery has been made for further evaluation and potential treatment options, which may include surgery, medication management, or injections. In the interim, gabapentin 100 mg will be initiated at night, with the option to add a morning dose after a few days if necessary. A drug screen will be conducted today, and a controlled medicine agreement will be signed. The patient is advised to report any side effects immediately. Gonzalez brambila.     2. Sleep apnea.  The patient has been advised to undergo testing for sleep apnea but is hesitant to use a CPAP machine. It was discussed that untreated sleep apnea could be contributing to his blood pressure issues. If the diagnosis is confirmed and the condition is serious, he will consider using the necessary equipment.    3. Blood pressure management.  The patient's blood pressure readings have shown improvement, consistently measuring in the 120s over 80s range. Today is 138/88 but patient is visibly uncomfortable due to pain, having to sit in awkward positioning for pain relief.     Follow-up  The patient will follow up in 2 weeks.    PROCEDURE  The patient has received injections for back pain on two occasions in the past, neither of which provided relief. Additionally, he received an injection for a pinched nerve in his neck approximately 2 years ago, which resolved the issue.            New Medications:   New Medications Ordered This Visit   Medications    gabapentin (NEURONTIN) 100 MG capsule     Sig: Take 1 capsule by mouth 2 (Two)  Times a Day.     Dispense:  28 capsule     Refill:  0       Discontinued Medications:   There are no discontinued medications.           Visit Diagnoses:    ICD-10-CM ICD-9-CM   1. Lumbar radiculopathy  M54.16 724.4   2. Daytime sleepiness  R40.0 780.54   3. Primary hypertension  I10 401.9            Follow Up:   Return if symptoms worsen or fail to improve.    Patient was given instructions and counseling regarding his condition or for health maintenance advice. Please see specific information pulled into the AVS if appropriate.     Patient or patient representative verbalized consent for the use of Ambient Listening during the visit with  ALONDRA Ward for chart documentation. 3/16/2025  20:33 EDT      This document has been electronically signed by ALONDRA Ward   March 16, 2025 20:34 EDT    Dictated Utilizing Dragon Dictation: Part of this note may be an electronic transcription/translation of spoken language to printed text using the Dragon Dictation System.

## 2025-04-03 ENCOUNTER — TELEPHONE (OUTPATIENT)
Dept: FAMILY MEDICINE CLINIC | Facility: CLINIC | Age: 45
End: 2025-04-03
Payer: MEDICAID

## 2025-04-03 NOTE — TELEPHONE ENCOUNTER
Caller: Brady Mcknight    Relationship: Self    Best call back number: [phone]     Requested Prescriptions:   Requested Prescriptions     Pending Prescriptions Disp Refills    metoprolol tartrate (LOPRESSOR) 50 MG tablet 60 tablet 0     Sig: Take 1 tablet by mouth 2 (Two) Times a Day for 30 days.          [facility] be sent[facility] [phone]  - [phone] FX     Last office visit with prescribing clinician: 3/13/2025   Last telemedicine visit with prescribing clinician: Visit date not found   Next office visit with prescribing clinician: Visit date not found     Additional details provided by patient: PATIENT HAS ONE DAY LEFT    Does the patient have less than a 3 day supply:  [x] Yes  [] No    Would you like a call back once the refill request has been completed: [] Yes [x] No    If the office needs to give you a call back, can they leave a voicemail: [] Yes [x] No    Jaspal Rabago Rep   04/03/25 14:41 EDT

## 2025-04-03 NOTE — TELEPHONE ENCOUNTER
Caller: Brady Mcknight    Relationship: Self    Best call back number: [phone]     What medication are you requesting:     -amLODIPine (NORVASC) 10 MG tablet    -levothyroxine (SYNTHROID, LEVOTHROID) 50 MCG tablet    -Baclofen (LIORESAL) 5 MG tablet    -aspirin 81 MG EC tablet      If a prescription is needed, what is your preferred pharmacy and phone number: [facility] [place] - [address] - [phone]  - [phone] FX     Additional notes: PATIENT WAS PRESCRIBED THESE IN THE HOSPITAL. HE STATES HE HAD DISCUSSED THEM WITH CHRISTINA AT HIS LAST APPT. PATIENT IS OUT OF THE MEDICATION

## 2025-04-04 RX ORDER — BACLOFEN 5 MG/1
5 TABLET ORAL 3 TIMES DAILY
Qty: 90 TABLET | Refills: 0 | Status: SHIPPED | OUTPATIENT
Start: 2025-04-04

## 2025-04-04 RX ORDER — AMLODIPINE BESYLATE 10 MG/1
10 TABLET ORAL DAILY
Qty: 30 TABLET | Refills: 0 | Status: SHIPPED | OUTPATIENT
Start: 2025-04-04

## 2025-04-04 RX ORDER — LEVOTHYROXINE SODIUM 50 UG/1
50 TABLET ORAL
Qty: 30 TABLET | Refills: 0 | Status: SHIPPED | OUTPATIENT
Start: 2025-04-04

## 2025-04-04 RX ORDER — METOPROLOL TARTRATE 50 MG
50 TABLET ORAL 2 TIMES DAILY
Qty: 60 TABLET | Refills: 0 | Status: SHIPPED | OUTPATIENT
Start: 2025-04-04 | End: 2025-05-04

## 2025-04-04 RX ORDER — ASPIRIN 81 MG/1
81 TABLET ORAL DAILY
Qty: 30 TABLET | Refills: 0 | Status: SHIPPED | OUTPATIENT
Start: 2025-04-04 | End: 2025-05-04

## 2025-04-04 NOTE — TELEPHONE ENCOUNTER
Refilled for him, will need updated appt to recheck thyroid labs. Thank you 2-3 weeks will be fine.

## 2025-05-08 ENCOUNTER — TELEPHONE (OUTPATIENT)
Dept: FAMILY MEDICINE CLINIC | Facility: CLINIC | Age: 45
End: 2025-05-08
Payer: MEDICAID

## 2025-05-08 NOTE — TELEPHONE ENCOUNTER
Caller: Brady Mcknight    Relationship to patient: Self      Best call back number: [phone]     Provider: COURTNEY     Medication PA needed: MRI OF LOWER BACK    Reason for call/Prior Auth:

## 2025-05-08 NOTE — TELEPHONE ENCOUNTER
It looks like he had an MRI on 03/05 and was referred to Neurosurgery, if I'm looking at it correctly. Was there a question about that one?

## 2025-05-09 DIAGNOSIS — M54.16 LUMBAR RADICULOPATHY: ICD-10-CM

## 2025-05-09 NOTE — TELEPHONE ENCOUNTER
Spoke with Brady he stated he got a letter from his insurance regarding MRI done in March & it was denied,he called his insurance & they told him someone needed to call to get a PA back dated. ( Sent to Jocelin)

## 2025-05-15 RX ORDER — AMLODIPINE BESYLATE 10 MG/1
10 TABLET ORAL DAILY
Qty: 30 TABLET | Refills: 0 | OUTPATIENT
Start: 2025-05-15

## 2025-05-15 RX ORDER — BACLOFEN 10 MG/1
TABLET ORAL
Qty: 45 TABLET | Refills: 0 | OUTPATIENT
Start: 2025-05-15

## 2025-05-15 RX ORDER — ASPIRIN 81 MG/1
81 TABLET ORAL DAILY
Qty: 30 TABLET | Refills: 0 | OUTPATIENT
Start: 2025-05-15

## 2025-05-15 RX ORDER — LEVOTHYROXINE SODIUM 50 UG/1
50 TABLET ORAL EVERY MORNING
Qty: 30 TABLET | Refills: 0 | OUTPATIENT
Start: 2025-05-15

## 2025-05-15 RX ORDER — GABAPENTIN 100 MG/1
100 CAPSULE ORAL 2 TIMES DAILY
Qty: 28 CAPSULE | Refills: 0 | OUTPATIENT
Start: 2025-05-15

## 2025-05-15 RX ORDER — METOPROLOL TARTRATE 50 MG
50 TABLET ORAL 2 TIMES DAILY
Qty: 60 TABLET | Refills: 0 | OUTPATIENT
Start: 2025-05-15

## 2025-07-01 ENCOUNTER — LAB (OUTPATIENT)
Dept: FAMILY MEDICINE CLINIC | Facility: CLINIC | Age: 45
End: 2025-07-01
Payer: MEDICAID

## 2025-07-01 ENCOUNTER — OFFICE VISIT (OUTPATIENT)
Dept: FAMILY MEDICINE CLINIC | Facility: CLINIC | Age: 45
End: 2025-07-01
Payer: MEDICAID

## 2025-07-01 VITALS
HEIGHT: 69 IN | OXYGEN SATURATION: 96 % | HEART RATE: 75 BPM | WEIGHT: 315 LBS | TEMPERATURE: 95.4 F | BODY MASS INDEX: 46.65 KG/M2 | SYSTOLIC BLOOD PRESSURE: 146 MMHG | DIASTOLIC BLOOD PRESSURE: 96 MMHG

## 2025-07-01 DIAGNOSIS — I10 BENIGN ESSENTIAL HTN: Primary | ICD-10-CM

## 2025-07-01 DIAGNOSIS — F32.A ANXIETY AND DEPRESSION: ICD-10-CM

## 2025-07-01 DIAGNOSIS — E66.01 MORBID (SEVERE) OBESITY DUE TO EXCESS CALORIES: ICD-10-CM

## 2025-07-01 DIAGNOSIS — E03.9 ACQUIRED HYPOTHYROIDISM: ICD-10-CM

## 2025-07-01 DIAGNOSIS — F41.9 ANXIETY AND DEPRESSION: ICD-10-CM

## 2025-07-01 PROCEDURE — 1160F RVW MEDS BY RX/DR IN RCRD: CPT | Performed by: NURSE PRACTITIONER

## 2025-07-01 PROCEDURE — 3080F DIAST BP >= 90 MM HG: CPT | Performed by: NURSE PRACTITIONER

## 2025-07-01 PROCEDURE — 99214 OFFICE O/P EST MOD 30 MIN: CPT | Performed by: NURSE PRACTITIONER

## 2025-07-01 PROCEDURE — 1126F AMNT PAIN NOTED NONE PRSNT: CPT | Performed by: NURSE PRACTITIONER

## 2025-07-01 PROCEDURE — 1159F MED LIST DOCD IN RCRD: CPT | Performed by: NURSE PRACTITIONER

## 2025-07-01 PROCEDURE — 3077F SYST BP >= 140 MM HG: CPT | Performed by: NURSE PRACTITIONER

## 2025-07-01 RX ORDER — METOPROLOL TARTRATE 50 MG
50 TABLET ORAL 2 TIMES DAILY
Qty: 60 TABLET | Refills: 5 | Status: SHIPPED | OUTPATIENT
Start: 2025-07-01 | End: 2025-07-31

## 2025-07-01 RX ORDER — LEVOTHYROXINE SODIUM 50 UG/1
50 TABLET ORAL
Qty: 30 TABLET | Refills: 11 | Status: SHIPPED | OUTPATIENT
Start: 2025-07-01

## 2025-07-01 RX ORDER — VENLAFAXINE HYDROCHLORIDE 225 MG/1
225 TABLET, EXTENDED RELEASE ORAL
Qty: 90 EACH | Refills: 1 | Status: SHIPPED | OUTPATIENT
Start: 2025-07-01

## 2025-07-01 RX ORDER — LISINOPRIL 10 MG/1
10 TABLET ORAL DAILY
Qty: 30 TABLET | Refills: 11 | Status: SHIPPED | OUTPATIENT
Start: 2025-07-01 | End: 2026-07-01

## 2025-07-01 RX ORDER — AMLODIPINE BESYLATE 10 MG/1
10 TABLET ORAL DAILY
Qty: 30 TABLET | Refills: 5 | Status: SHIPPED | OUTPATIENT
Start: 2025-07-01

## 2025-07-17 ENCOUNTER — TELEPHONE (OUTPATIENT)
Dept: FAMILY MEDICINE CLINIC | Facility: CLINIC | Age: 45
End: 2025-07-17
Payer: MEDICAID

## (undated) DEVICE — [HIGH FLOW INSUFFLATOR,  DO NOT USE IF PACKAGE IS DAMAGED,  KEEP DRY,  KEEP AWAY FROM SUNLIGHT,  PROTECT FROM HEAT AND RADIOACTIVE SOURCES.]: Brand: PNEUMOSURE

## (undated) DEVICE — 2, DISPOSABLE SUCTION/IRRIGATOR WITH DISPOSABLE TIP: Brand: STRYKEFLOW

## (undated) DEVICE — SYR LUERLOK 30CC

## (undated) DEVICE — TRAP,MUCUS SPECIMEN,40CC: Brand: MEDLINE

## (undated) DEVICE — ST CATH CHOLANG WKARLAN/BALN 2LUM 4F 1.25

## (undated) DEVICE — ENCORE® LATEX MICRO SIZE 7, STERILE LATEX POWDER-FREE SURGICAL GLOVE: Brand: ENCORE

## (undated) DEVICE — ENDOPATH PNEUMONEEDLE INSUFFLATION NEEDLES WITH LUER LOCK CONNECTORS 150MM: Brand: ENDOPATH

## (undated) DEVICE — BNDG ADHS CURAD FLX/FABRC 2X4IN STRL LF

## (undated) DEVICE — HOLDER: Brand: DEROYAL

## (undated) DEVICE — SINGLE PORT MANIFOLD: Brand: NEPTUNE 2

## (undated) DEVICE — Device: Brand: DEFENDO AIR/WATER/SUCTION AND BIOPSY VALVE

## (undated) DEVICE — INSUFFLATION NEEDLE TO ESTABLISH PNEUMOPERITONEUM.: Brand: INSUFFLATION NEEDLE

## (undated) DEVICE — DRP C/ARM W/BAND W/CLIPS 41X74IN

## (undated) DEVICE — ENDOCUT SCISSOR TIP, DISPOSABLE: Brand: RENEW

## (undated) DEVICE — TUBING, SUCTION, 1/4" X 20', STRAIGHT: Brand: MEDLINE INDUSTRIES, INC.

## (undated) DEVICE — SUT PROLN 4/0 PS2 18IN 8682G

## (undated) DEVICE — Device

## (undated) DEVICE — SUT PDS 2/0 CT2 27IN VIL PDP333H

## (undated) DEVICE — COR LAP CHOLE: Brand: MEDLINE INDUSTRIES, INC.

## (undated) DEVICE — Device: Brand: ENDOGATOR

## (undated) DEVICE — ENDOPATH XCEL BLADELESS TROCARS WITH STABILITY SLEEVES: Brand: ENDOPATH XCEL

## (undated) DEVICE — CONN Y IRR DISP 1P/U

## (undated) DEVICE — ENDOPATH XCEL UNIVERSAL TROCAR STABLILITY SLEEVES: Brand: ENDOPATH XCEL